# Patient Record
Sex: FEMALE | Race: WHITE | NOT HISPANIC OR LATINO | URBAN - METROPOLITAN AREA
[De-identification: names, ages, dates, MRNs, and addresses within clinical notes are randomized per-mention and may not be internally consistent; named-entity substitution may affect disease eponyms.]

---

## 2020-04-18 ENCOUNTER — EMERGENCY (EMERGENCY)
Facility: HOSPITAL | Age: 85
LOS: 0 days | Discharge: HOME | End: 2020-04-18
Payer: MEDICARE

## 2020-04-18 VITALS
TEMPERATURE: 100 F | HEART RATE: 72 BPM | DIASTOLIC BLOOD PRESSURE: 79 MMHG | SYSTOLIC BLOOD PRESSURE: 141 MMHG | OXYGEN SATURATION: 99 % | RESPIRATION RATE: 18 BRPM

## 2020-04-18 DIAGNOSIS — R05 COUGH: ICD-10-CM

## 2020-04-18 DIAGNOSIS — R50.9 FEVER, UNSPECIFIED: ICD-10-CM

## 2020-04-18 DIAGNOSIS — B34.9 VIRAL INFECTION, UNSPECIFIED: ICD-10-CM

## 2020-04-18 DIAGNOSIS — R19.7 DIARRHEA, UNSPECIFIED: ICD-10-CM

## 2020-04-18 PROCEDURE — 99282 EMERGENCY DEPT VISIT SF MDM: CPT

## 2020-04-18 PROCEDURE — 99283 EMERGENCY DEPT VISIT LOW MDM: CPT

## 2020-04-18 PROCEDURE — 99283 EMERGENCY DEPT VISIT LOW MDM: CPT | Mod: CS

## 2020-04-18 PROCEDURE — 87635 SARS-COV-2 COVID-19 AMP PRB: CPT

## 2020-04-18 NOTE — ED STATDOCS - OBJECTIVE STATEMENT
Pt presents to ED with  low grade fever, cough, diarrhea x 3 days.   Pt concerned for possible COVID-19.   Pt here for testing.

## 2020-04-18 NOTE — ED STATDOCS - NS ED ROS FT
ROS:   Constitutional- +fever, +chills.    ENT- +rhinorrhea, no sore throat, no congestion.    Cardiac- no chest pain, no palpitations,   Respiratory- no cough, no SOB    Abdomen- No nausea, no vomiting,  + diarrhea.    Urinary- no dysuria, no urgency, no frequency.    Skin- No rashes

## 2020-04-18 NOTE — ED STATDOCS - PHYSICAL EXAMINATION
Constitutional: NAD AAOx3. Nontoxic, well appearing. Speaking full sentences  w/o distress  Eyes: EOMI, pupils equal  Head: Normocephalic atraumatic  Mouth: no airway obstruction  Cardiac: [id number]   Resp: Lungs CTAB  GI: Abd s/nt/nd  Neuro: motor and sensory intact

## 2020-04-18 NOTE — ED STATDOCS - PATIENT PORTAL LINK FT
You can access the FollowMyHealth Patient Portal offered by [facility] by registering at the following website: [email or web address]. By joining [facility]’s FollowMyHealth portal, you will also be able to view your health information using other applications (apps) compatible with our system.

## 2020-04-18 NOTE — ED ADULT NURSE NOTE - OBJECTIVE STATEMENT
PATIENT WAS TREATED, EVALUATED AND DISCHARGED BY INTAKE PROVIDER. PLEASE SEE PROVIDER NOTE FOR ASSESSMENT.  DISCHARGE INSTRUCTIONS REVIEWED WITH PATIENT VERBALLY, PT VERBALIZED UNDERSTANDING OF DISCHARGE INSTRUCTIONS. PAPER COPY OF DISCHARGE INSTRUCTIONS GIVEN TO PATIENT WITH SELF YELITZA AND COVID 19 INFORMATION.  Patient has given verbal consent to call/text them with results.

## 2020-04-19 LAB — SARS-COV-2 RNA SPEC QL NAA+PROBE: SIGNIFICANT CHANGE UP

## 2020-04-20 ENCOUNTER — EMERGENCY (EMERGENCY)
Facility: HOSPITAL | Age: 85
LOS: 0 days | Discharge: HOME | End: 2020-04-20
Attending: EMERGENCY MEDICINE
Payer: MEDICARE

## 2020-04-20 VITALS
OXYGEN SATURATION: 94 % | DIASTOLIC BLOOD PRESSURE: 67 MMHG | RESPIRATION RATE: 18 BRPM | TEMPERATURE: 99 F | SYSTOLIC BLOOD PRESSURE: 140 MMHG

## 2020-04-20 VITALS — WEIGHT: 139.99 LBS

## 2020-04-20 DIAGNOSIS — R19.7 DIARRHEA, UNSPECIFIED: ICD-10-CM

## 2020-04-20 DIAGNOSIS — E11.9 TYPE 2 DIABETES MELLITUS WITHOUT COMPLICATIONS: ICD-10-CM

## 2020-04-20 DIAGNOSIS — R00.1 BRADYCARDIA, UNSPECIFIED: ICD-10-CM

## 2020-04-20 DIAGNOSIS — I67.89 OTHER CEREBROVASCULAR DISEASE: ICD-10-CM

## 2020-04-20 DIAGNOSIS — U07.1 COVID-19: ICD-10-CM

## 2020-04-20 DIAGNOSIS — F03.90 UNSPECIFIED DEMENTIA WITHOUT BEHAVIORAL DISTURBANCE: ICD-10-CM

## 2020-04-20 DIAGNOSIS — J12.89 OTHER VIRAL PNEUMONIA: ICD-10-CM

## 2020-04-20 DIAGNOSIS — I10 ESSENTIAL (PRIMARY) HYPERTENSION: ICD-10-CM

## 2020-04-20 DIAGNOSIS — R41.82 ALTERED MENTAL STATUS, UNSPECIFIED: ICD-10-CM

## 2020-04-20 LAB
ALBUMIN SERPL ELPH-MCNC: 2.8 G/DL — LOW (ref 3.3–5)
ALP SERPL-CCNC: 78 U/L — SIGNIFICANT CHANGE UP (ref 40–120)
ALT FLD-CCNC: 26 U/L — SIGNIFICANT CHANGE UP (ref 12–78)
ANION GAP SERPL CALC-SCNC: 4 MMOL/L — LOW (ref 5–17)
APPEARANCE UR: ABNORMAL
AST SERPL-CCNC: 42 U/L — HIGH (ref 15–37)
BASOPHILS # BLD AUTO: 0.02 K/UL — SIGNIFICANT CHANGE UP (ref 0–0.2)
BASOPHILS NFR BLD AUTO: 0.5 % — SIGNIFICANT CHANGE UP (ref 0–2)
BILIRUB SERPL-MCNC: 0.6 MG/DL — SIGNIFICANT CHANGE UP (ref 0.2–1.2)
BILIRUB UR-MCNC: NEGATIVE — SIGNIFICANT CHANGE UP
BUN SERPL-MCNC: 18 MG/DL — SIGNIFICANT CHANGE UP (ref 7–23)
CALCIUM SERPL-MCNC: 8.3 MG/DL — LOW (ref 8.5–10.1)
CHLORIDE SERPL-SCNC: 98 MMOL/L — SIGNIFICANT CHANGE UP (ref 96–108)
CO2 SERPL-SCNC: 34 MMOL/L — HIGH (ref 22–31)
COLOR SPEC: YELLOW — SIGNIFICANT CHANGE UP
COMMENT - URINE: SIGNIFICANT CHANGE UP
COMMENT - URINE: SIGNIFICANT CHANGE UP
CREAT SERPL-MCNC: 1.06 MG/DL — SIGNIFICANT CHANGE UP (ref 0.5–1.3)
CRP SERPL-MCNC: 2.98 MG/DL — HIGH (ref 0–0.4)
D DIMER BLD IA.RAPID-MCNC: 447 NG/ML DDU — HIGH
DIFF PNL FLD: ABNORMAL
EOSINOPHIL # BLD AUTO: 0 K/UL — SIGNIFICANT CHANGE UP (ref 0–0.5)
EOSINOPHIL NFR BLD AUTO: 0 % — SIGNIFICANT CHANGE UP (ref 0–6)
FERRITIN SERPL-MCNC: 259 NG/ML — HIGH (ref 15–150)
FIBRINOGEN PPP-MCNC: 663 MG/DL — HIGH (ref 320–520)
GLUCOSE SERPL-MCNC: 114 MG/DL — HIGH (ref 70–99)
GLUCOSE UR QL: NEGATIVE MG/DL — SIGNIFICANT CHANGE UP
HCT VFR BLD CALC: 39.4 % — SIGNIFICANT CHANGE UP (ref 34.5–45)
HGB BLD-MCNC: 12.9 G/DL — SIGNIFICANT CHANGE UP (ref 11.5–15.5)
IMM GRANULOCYTES NFR BLD AUTO: 0.3 % — SIGNIFICANT CHANGE UP (ref 0–1.5)
KETONES UR-MCNC: NEGATIVE — SIGNIFICANT CHANGE UP
LACTATE SERPL-SCNC: 0.8 MMOL/L — SIGNIFICANT CHANGE UP (ref 0.7–2)
LDH SERPL L TO P-CCNC: 217 U/L — SIGNIFICANT CHANGE UP (ref 84–241)
LEUKOCYTE ESTERASE UR-ACNC: NEGATIVE — SIGNIFICANT CHANGE UP
LYMPHOCYTES # BLD AUTO: 1.18 K/UL — SIGNIFICANT CHANGE UP (ref 1–3.3)
LYMPHOCYTES # BLD AUTO: 30.7 % — SIGNIFICANT CHANGE UP (ref 13–44)
MCHC RBC-ENTMCNC: 27.7 PG — SIGNIFICANT CHANGE UP (ref 27–34)
MCHC RBC-ENTMCNC: 32.7 GM/DL — SIGNIFICANT CHANGE UP (ref 32–36)
MCV RBC AUTO: 84.5 FL — SIGNIFICANT CHANGE UP (ref 80–100)
MONOCYTES # BLD AUTO: 0.52 K/UL — SIGNIFICANT CHANGE UP (ref 0–0.9)
MONOCYTES NFR BLD AUTO: 13.5 % — SIGNIFICANT CHANGE UP (ref 2–14)
NEUTROPHILS # BLD AUTO: 2.11 K/UL — SIGNIFICANT CHANGE UP (ref 1.8–7.4)
NEUTROPHILS NFR BLD AUTO: 55 % — SIGNIFICANT CHANGE UP (ref 43–77)
NITRITE UR-MCNC: NEGATIVE — SIGNIFICANT CHANGE UP
PH UR: 5 — SIGNIFICANT CHANGE UP (ref 5–8)
PLATELET # BLD AUTO: 125 K/UL — LOW (ref 150–400)
POTASSIUM SERPL-MCNC: 3.3 MMOL/L — LOW (ref 3.5–5.3)
POTASSIUM SERPL-SCNC: 3.3 MMOL/L — LOW (ref 3.5–5.3)
PROCALCITONIN SERPL-MCNC: 0.09 NG/ML — SIGNIFICANT CHANGE UP (ref 0.02–0.1)
PROT SERPL-MCNC: 6.3 GM/DL — SIGNIFICANT CHANGE UP (ref 6–8.3)
PROT UR-MCNC: 100 MG/DL
RBC # BLD: 4.66 M/UL — SIGNIFICANT CHANGE UP (ref 3.8–5.2)
RBC # FLD: 13.6 % — SIGNIFICANT CHANGE UP (ref 10.3–14.5)
RBC CASTS # UR COMP ASSIST: SIGNIFICANT CHANGE UP /HPF (ref 0–4)
SARS-COV-2 RNA SPEC QL NAA+PROBE: DETECTED
SODIUM SERPL-SCNC: 136 MMOL/L — SIGNIFICANT CHANGE UP (ref 135–145)
SP GR SPEC: 1.02 — SIGNIFICANT CHANGE UP (ref 1.01–1.02)
UROBILINOGEN FLD QL: NEGATIVE MG/DL — SIGNIFICANT CHANGE UP
WBC # BLD: 3.84 K/UL — SIGNIFICANT CHANGE UP (ref 3.8–10.5)
WBC # FLD AUTO: 3.84 K/UL — SIGNIFICANT CHANGE UP (ref 3.8–10.5)

## 2020-04-20 PROCEDURE — 85384 FIBRINOGEN ACTIVITY: CPT

## 2020-04-20 PROCEDURE — 99285 EMERGENCY DEPT VISIT HI MDM: CPT | Mod: CS

## 2020-04-20 PROCEDURE — 85025 COMPLETE CBC W/AUTO DIFF WBC: CPT

## 2020-04-20 PROCEDURE — 81001 URINALYSIS AUTO W/SCOPE: CPT

## 2020-04-20 PROCEDURE — 71045 X-RAY EXAM CHEST 1 VIEW: CPT | Mod: 26

## 2020-04-20 PROCEDURE — 93005 ELECTROCARDIOGRAM TRACING: CPT

## 2020-04-20 PROCEDURE — 82728 ASSAY OF FERRITIN: CPT

## 2020-04-20 PROCEDURE — 83605 ASSAY OF LACTIC ACID: CPT

## 2020-04-20 PROCEDURE — 93010 ELECTROCARDIOGRAM REPORT: CPT

## 2020-04-20 PROCEDURE — 84145 PROCALCITONIN (PCT): CPT

## 2020-04-20 PROCEDURE — 85379 FIBRIN DEGRADATION QUANT: CPT

## 2020-04-20 PROCEDURE — 71045 X-RAY EXAM CHEST 1 VIEW: CPT

## 2020-04-20 PROCEDURE — 99285 EMERGENCY DEPT VISIT HI MDM: CPT | Mod: 25

## 2020-04-20 PROCEDURE — 36415 COLL VENOUS BLD VENIPUNCTURE: CPT

## 2020-04-20 PROCEDURE — 96360 HYDRATION IV INFUSION INIT: CPT

## 2020-04-20 PROCEDURE — 87635 SARS-COV-2 COVID-19 AMP PRB: CPT

## 2020-04-20 PROCEDURE — 70450 CT HEAD/BRAIN W/O DYE: CPT | Mod: 26

## 2020-04-20 PROCEDURE — 80053 COMPREHEN METABOLIC PANEL: CPT

## 2020-04-20 PROCEDURE — 83615 LACTATE (LD) (LDH) ENZYME: CPT

## 2020-04-20 PROCEDURE — 86140 C-REACTIVE PROTEIN: CPT

## 2020-04-20 PROCEDURE — 70450 CT HEAD/BRAIN W/O DYE: CPT

## 2020-04-20 RX ORDER — SODIUM CHLORIDE 9 MG/ML
500 INJECTION INTRAMUSCULAR; INTRAVENOUS; SUBCUTANEOUS ONCE
Refills: 0 | Status: COMPLETED | OUTPATIENT
Start: 2020-04-20 | End: 2020-04-20

## 2020-04-20 RX ORDER — ACETAMINOPHEN 500 MG
650 TABLET ORAL ONCE
Refills: 0 | Status: COMPLETED | OUTPATIENT
Start: 2020-04-20 | End: 2020-04-20

## 2020-04-20 RX ADMIN — SODIUM CHLORIDE 500 MILLILITER(S): 9 INJECTION INTRAMUSCULAR; INTRAVENOUS; SUBCUTANEOUS at 11:30

## 2020-04-20 RX ADMIN — SODIUM CHLORIDE 500 MILLILITER(S): 9 INJECTION INTRAMUSCULAR; INTRAVENOUS; SUBCUTANEOUS at 12:22

## 2020-04-20 RX ADMIN — Medication 650 MILLIGRAM(S): at 12:22

## 2020-04-20 NOTE — ED PROVIDER NOTE - PROGRESS NOTE DETAILS
86 y/o F presents with diarrhea. As per daughter pt has been having frequent episodes of diarrhea, decreased appetite and lethargy. Family member at home + for covid. -Tyler Oleary PA-C pt ambualted around ED, O2 sat maintined at 98%. High suspcion for covid despite negative result. SPoke with patients daughter, discsussed supportive care. She feels comfortable caring for pt at home. Advised to return to ED immediately for any new or concerning symptoms or worsening symptoms. -Tyler Oleary PA-C

## 2020-04-20 NOTE — ED PROVIDER NOTE - CARE PLAN
Principal Discharge DX:	Diarrhea  Secondary Diagnosis:	Viral syndrome Principal Discharge DX:	Diarrhea  Secondary Diagnosis:	Viral syndrome  Secondary Diagnosis:	Pneumonia

## 2020-04-20 NOTE — ED ADULT TRIAGE NOTE - CHIEF COMPLAINT QUOTE
Pt sent by NP from [facility] for possible dehydration.  Pt with baseline dementia, dropped off from home by family member Pt sent by NP from [facility] for possible dehydration.  Pt with baseline dementia, dropped off from home by family member.  Pt without complaints.  Hx DM.

## 2020-04-20 NOTE — ED PROVIDER NOTE - CLINICAL SUMMARY MEDICAL DECISION MAKING FREE TEXT BOX
Pt with progressively worsening AMS, possible infectious encephalopathy, likely covid despite negative testing yesterday. Pt with diarrhea and decreased PO intake, per daughter pt requiring force-fed fluids and food, incontinent of stool. Will image head and chest, check urine, will resend COVID and provide IV fluids, if pt tolerating PO fluids, and improving on reassessment, will DC pending results. Pt with more drowsy  and with decreased appetite.  Has some baseline dementia, but appears to be at baseline although feeling ill.  No acute complaints per patient.  Per discussion with daughter the patient is not eating and having multiple bouts of diarrhea.  Story, labs, and imaging all c/w covid despite recent negative test.  Will reswab.  Pt with normal ambulatory sats and satting well throughout ed stay.  Suspect covid after w/u.  No other s/sx of underlying infection.  Later noted to be covid positive.  Daughter comfortable with caring for patient at home.  D/c home with strict return precautions and prompt outpatient f/u.

## 2020-04-20 NOTE — ED ADULT NURSE REASSESSMENT NOTE - NS ED NURSE REASSESS COMMENT FT1
Pt was able to walk with assistance around the nurses station .pt maintained  her O2 sat 97%  at all time while walking.

## 2020-04-20 NOTE — ED ADULT NURSE NOTE - OBJECTIVE STATEMENT
Pt came from home .according to her daughter pt is not her self weak and heaving diarrhea. pt is a/o 2 name and place. pt new she lives with her daughter but she didn't know how she got here. pt has dementia. Ashutosh FLOREZ spoke to pt's daughter.according to pt's daughter one of the people in the house have covid.

## 2020-04-20 NOTE — ED ADULT NURSE NOTE - CHIEF COMPLAINT QUOTE
Pt sent by NP from [facility] for possible dehydration.  Pt with baseline dementia, dropped off from home by family member.  Pt without complaints.  Hx DM.

## 2020-04-20 NOTE — ED PROVIDER NOTE - NSFOLLOWUPINSTRUCTIONS_ED_ALL_ED_FT
Acute Diarrhea    WHAT YOU NEED TO KNOW:    Acute diarrhea starts quickly and lasts a short time, usually 1 to 3 days. It can last up to 2 weeks. You may not be able to control your diarrhea. Acute diarrhea usually stops on its own.     DISCHARGE INSTRUCTIONS:    Return to the emergency department if:     You feel confused.       Your heartbeat is faster than usual.       Your eyes look deeply sunken, or you have no tears when you cry.       You urinate less than usual, or your urine is dark yellow.       You have blood or mucus in your bowel movements.      You have severe abdominal pain.       You are unable to drink any liquids.     Contact your healthcare provider if:     Your symptoms do not get better with treatment.       You have a fever higher than 101.3°F (38.5°C).       You have trouble eating and drinking because you are vomiting.       Your diarrhea does not get better in 7 days.       You have questions or concerns about your condition or care.     Follow up with your healthcare provider as directed: Write down your questions so you remember to ask them during your visits.     Medicines:    Diarrhea medicine is an over-the-counter medicine that helps slow or stop your diarrhea. Do not take this medicine unless your healthcare provider says it is okay.       Antibiotics may be given to help treat an infection caused by bacteria.       Antiparasitics may be given to treat an infection caused by parasites.       Take your medicine as directed. Contact your healthcare provider if you think your medicine is not helping or if you have side effects. Tell him of her if you are allergic to any medicine. Keep a list of the medicines, vitamins, and herbs you take. Include the amounts, and when and why you take them. Bring the list or the pill bottles to follow-up visits. Carry your medicine list with you in case of an emergency.    Self-care:     Drink liquids as directed. Liquids will help prevent dehydration caused by diarrhea. Ask your healthcare provider how much liquid to drink each day and which liquids are best for you. You may need to drink an oral rehydration solution (ORS). An ORS has the right amounts of water, salts, and sugar you need to replace body fluids. You can buy an ORS at most grocery stores and pharmacies.       Eat foods that are easy to digest. Examples include rice, lentils, cereal, bananas, potatoes, and bread. It also includes some fruits (bananas, melon), well-cooked vegetables, and lean meats. Do not eat foods high in fiber, fat, and sugar. Do not drink alcohol until your diarrhea is gone.     Prevent acute diarrhea:     Wash your hands often. Use soap and water. Wash your hands before you eat or prepare food. Also wash your hands after you use the bathroom. Use an alcohol-based hand gel when soap and water are not available. Handwashing           Keep bathroom surfaces clean. This helps prevent the spread of germs that cause acute diarrhea.       Wash fruits and vegetables well before you eat them. This can help remove germs that cause diarrhea. If possible, remove the skin from fruits and vegetables, or cook them well before you eat them.       Cook meat and poultry as directed. Meat includes beef and pork. Poultry includes chicken, turkey, and duck.  Cook ground meat to 160°F.       Cook ground poultry, whole poultry, or cuts of poultry to at least 165°F. Remove the poultry from heat. Let it stand for 3 minutes before you eat it.       Cook whole cuts of meat other than poultry to at least 145°F. Remove the meat from heat. Let it stand for 3 minutes before you eat it.       Wash dishes that have touched raw meat or poultry with hot water and soap. This includes cutting boards, utensils, dishes, and serving containers.       Place raw or cooked meat or poultry in the refrigerator as soon as possible. Bacteria can grow in meat or poultry that is left at room temperature too long.       Do not eat raw or undercooked oysters, clams, or mussels. These foods may be contaminated and cause infection.       Drink only filtered or treated water when you travel. Do not put ice in your drinks. Drink bottled water whenever possible.     COVID-19  COVID-19, also known as coronavirus disease or novel coronavirus, is caused by a type of virus that causes respiratory illness. This may lead to inflammation and the buildup of mucus and fluids in the airway of the lungs (pneumonia). There are many different coronaviruses. Most of these viruses only affect animals, but sometimes these viruses can change and infect people.  What are the causes?  This illness is caused by a virus. You may catch the virus by:  Breathing in droplets from an infected person's cough or sneeze.Touching something, like a table or a doorknob, that was exposed to the virus (contaminated) and then touching your mouth, nose, or eyes.Being around animals that carry the virus, or eating uncooked or undercooked meat or animal products that contain the virus.What increases the risk?  You are more likely to develop this condition if you:  Live in or travel to an area with a COVID-19 outbreak.Come in contact with a sick person who recently traveled to an area with a COVID-19 outbreak.Provide care for or live with a person who is infected with COVID-19.What are the signs or symptoms?  COVID-19 causes respiratory illness that can lead to pneumonia. Symptoms of pneumonia may include:  A fever.A cough.Difficulty breathing.How is this diagnosed?  This condition may be diagnosed based on:  Your signs and symptoms, especially if:  You live in an area with a COVID-19 outbreak.You recently traveled to or from an area where the virus is common.You provide care for or live with a person who was diagnosed with COVID-19.A physical exam.Lab tests, which may include:  A nasal swab to take a sample of fluid from your nose.A throat swab to take a sample of fluid from your throat.A sample of mucus from your lungs (sputum).Blood tests.How is this treated?  There is no medicine to treat COVID-19. Your health care provider will talk with you about ways to treat your symptoms. This may include rest, fluids, and over-the-counter medicines.  Follow these instructions at home:  Lifestyle     Use a cool-mist humidifier to add moisture to the air. This can help you breathe more easily.Do not use any products that contain nicotine or tobacco, such as cigarettes, e-cigarettes, and chewing tobacco. If you need help quitting, ask your health care provider.Rest at home as told by your health care provider.Return to your normal activities as told by your health care provider. Ask your health care provider what activities are safe for you.General instructions     Take over-the-counter and prescription medicines only as told by your health care provider.Drink enough fluid to keep your urine pale yellow.Keep all follow-up visits as told by your health care provider. This is important.How is this prevented?     There is no vaccine to help prevent COVID-19 infection. However, there are steps you can take to protect yourself and others from this virus.  To protect yourself:     Do not travel to areas where COVID-19 is a risk. The areas where COVID-19 is reported change often. To identify high-risk areas, check the CDC travel website: wwwnc.cdc.gov/travel/noticesIf you live in, or must travel to, an area where COVID-19 is a risk, take precautions to avoid infection.  Stay away from people who are sick.Stay away from places where there are animals that may carry the virus. This includes places where animals and animal products are sold. Note that both living and dead animals can carry the virus.Wash your hands often with soap and water. If soap and water are not available, use an alcohol-based hand .Avoid touching your mouth, face, eyes, or nose.To protect others:     If you have symptoms, take steps to prevent the virus from spreading to others.  If you think you have a COVID-19 infection, contact your health care provider right away. Tell your health care team that you think you may have a COVID-19 infection.Stay home. Leave your house only to seek medical care.Do not travel while you are sick.Wash your hands often with soap and water. If soap and water are not available, use alcohol-based hand .Stay away from other members of your household. If possible, stay in your own room, separate from others. Use a different bathroom.Make sure that all people in your household wash their hands well and often.Cough or sneeze into a tissue or your sleeve or elbow. Do not cough or sneeze into your hand or into the air.Wear a face mask.Where to find more information  Centers for Disease Control and Prevention: www.cdc.gov/coronavirus/2019-ncov/index.htmlWformerly [facility] Health Organization: www.who.int/health-topics/coronavirusContact a health care provider if:  You have traveled to an area where COVID-19 is a risk and you have symptoms of the infection.You have contact with someone who has traveled to an area where COVID-19 is a risk and you have symptoms of the infection.Get help right away if:  You have trouble breathing.You have chest pain.Summary  COVID-19 is caused by a type of virus that causes respiratory illness. This may lead to inflammation and the buildup of mucus and fluids in the airway of the lungs (pneumonia).You are more likely to develop this condition if you live in or travel to an area with a COVID-19 outbreak.There is no medicine to treat COVID-19. Your health care provider will talk with you about ways to treat your symptoms.Take steps to protect yourself and others from infection. Wash your hands often. Stay away from other people who are sick and wear a mask if you are sick. Return to ED immediately for any new or concerning symptoms or worsening symptoms.     Acute Diarrhea    WHAT YOU NEED TO KNOW:    Acute diarrhea starts quickly and lasts a short time, usually 1 to 3 days. It can last up to 2 weeks. You may not be able to control your diarrhea. Acute diarrhea usually stops on its own.     DISCHARGE INSTRUCTIONS:    Return to the emergency department if:     You feel confused.       Your heartbeat is faster than usual.       Your eyes look deeply sunken, or you have no tears when you cry.       You urinate less than usual, or your urine is dark yellow.       You have blood or mucus in your bowel movements.      You have severe abdominal pain.       You are unable to drink any liquids.     Contact your healthcare provider if:     Your symptoms do not get better with treatment.       You have a fever higher than 101.3°F (38.5°C).       You have trouble eating and drinking because you are vomiting.       Your diarrhea does not get better in 7 days.       You have questions or concerns about your condition or care.     Follow up with your healthcare provider as directed: Write down your questions so you remember to ask them during your visits.     Medicines:    Diarrhea medicine is an over-the-counter medicine that helps slow or stop your diarrhea. Do not take this medicine unless your healthcare provider says it is okay.       Antibiotics may be given to help treat an infection caused by bacteria.       Antiparasitics may be given to treat an infection caused by parasites.       Take your medicine as directed. Contact your healthcare provider if you think your medicine is not helping or if you have side effects. Tell him of her if you are allergic to any medicine. Keep a list of the medicines, vitamins, and herbs you take. Include the amounts, and when and why you take them. Bring the list or the pill bottles to follow-up visits. Carry your medicine list with you in case of an emergency.    Self-care:     Drink liquids as directed. Liquids will help prevent dehydration caused by diarrhea. Ask your healthcare provider how much liquid to drink each day and which liquids are best for you. You may need to drink an oral rehydration solution (ORS). An ORS has the right amounts of water, salts, and sugar you need to replace body fluids. You can buy an ORS at most grocery stores and pharmacies.       Eat foods that are easy to digest. Examples include rice, lentils, cereal, bananas, potatoes, and bread. It also includes some fruits (bananas, melon), well-cooked vegetables, and lean meats. Do not eat foods high in fiber, fat, and sugar. Do not drink alcohol until your diarrhea is gone.     Prevent acute diarrhea:     Wash your hands often. Use soap and water. Wash your hands before you eat or prepare food. Also wash your hands after you use the bathroom. Use an alcohol-based hand gel when soap and water are not available. Handwashing           Keep bathroom surfaces clean. This helps prevent the spread of germs that cause acute diarrhea.       Wash fruits and vegetables well before you eat them. This can help remove germs that cause diarrhea. If possible, remove the skin from fruits and vegetables, or cook them well before you eat them.       Cook meat and poultry as directed. Meat includes beef and pork. Poultry includes chicken, turkey, and duck.  Cook ground meat to 160°F.       Cook ground poultry, whole poultry, or cuts of poultry to at least 165°F. Remove the poultry from heat. Let it stand for 3 minutes before you eat it.       Cook whole cuts of meat other than poultry to at least 145°F. Remove the meat from heat. Let it stand for 3 minutes before you eat it.       Wash dishes that have touched raw meat or poultry with hot water and soap. This includes cutting boards, utensils, dishes, and serving containers.       Place raw or cooked meat or poultry in the refrigerator as soon as possible. Bacteria can grow in meat or poultry that is left at room temperature too long.       Do not eat raw or undercooked oysters, clams, or mussels. These foods may be contaminated and cause infection.       Drink only filtered or treated water when you travel. Do not put ice in your drinks. Drink bottled water whenever possible.     COVID-19  COVID-19, also known as coronavirus disease or novel coronavirus, is caused by a type of virus that causes respiratory illness. This may lead to inflammation and the buildup of mucus and fluids in the airway of the lungs (pneumonia). There are many different coronaviruses. Most of these viruses only affect animals, but sometimes these viruses can change and infect people.  What are the causes?  This illness is caused by a virus. You may catch the virus by:  Breathing in droplets from an infected person's cough or sneeze.Touching something, like a table or a doorknob, that was exposed to the virus (contaminated) and then touching your mouth, nose, or eyes.Being around animals that carry the virus, or eating uncooked or undercooked meat or animal products that contain the virus.What increases the risk?  You are more likely to develop this condition if you:  Live in or travel to an area with a COVID-19 outbreak.Come in contact with a sick person who recently traveled to an area with a COVID-19 outbreak.Provide care for or live with a person who is infected with COVID-19.What are the signs or symptoms?  COVID-19 causes respiratory illness that can lead to pneumonia. Symptoms of pneumonia may include:  A fever.A cough.Difficulty breathing.How is this diagnosed?  This condition may be diagnosed based on:  Your signs and symptoms, especially if:  You live in an area with a COVID-19 outbreak.You recently traveled to or from an area where the virus is common.You provide care for or live with a person who was diagnosed with COVID-19.A physical exam.Lab tests, which may include:  A nasal swab to take a sample of fluid from your nose.A throat swab to take a sample of fluid from your throat.A sample of mucus from your lungs (sputum).Blood tests.How is this treated?  There is no medicine to treat COVID-19. Your health care provider will talk with you about ways to treat your symptoms. This may include rest, fluids, and over-the-counter medicines.  Follow these instructions at home:  Lifestyle     Use a cool-mist humidifier to add moisture to the air. This can help you breathe more easily.Do not use any products that contain nicotine or tobacco, such as cigarettes, e-cigarettes, and chewing tobacco. If you need help quitting, ask your health care provider.Rest at home as told by your health care provider.Return to your normal activities as told by your health care provider. Ask your health care provider what activities are safe for you.General instructions     Take over-the-counter and prescription medicines only as told by your health care provider.Drink enough fluid to keep your urine pale yellow.Keep all follow-up visits as told by your health care provider. This is important.How is this prevented?     There is no vaccine to help prevent COVID-19 infection. However, there are steps you can take to protect yourself and others from this virus.  To protect yourself:     Do not travel to areas where COVID-19 is a risk. The areas where COVID-19 is reported change often. To identify high-risk areas, check the CDC travel website: wwwnc.cdc.gov/travel/noticesIf you live in, or must travel to, an area where COVID-19 is a risk, take precautions to avoid infection.  Stay away from people who are sick.Stay away from places where there are animals that may carry the virus. This includes places where animals and animal products are sold. Note that both living and dead animals can carry the virus.Wash your hands often with soap and water. If soap and water are not available, use an alcohol-based hand .Avoid touching your mouth, face, eyes, or nose.To protect others:     If you have symptoms, take steps to prevent the virus from spreading to others.  If you think you have a COVID-19 infection, contact your health care provider right away. Tell your health care team that you think you may have a COVID-19 infection.Stay home. Leave your house only to seek medical care.Do not travel while you are sick.Wash your hands often with soap and water. If soap and water are not available, use alcohol-based hand .Stay away from other members of your household. If possible, stay in your own room, separate from others. Use a different bathroom.Make sure that all people in your household wash their hands well and often.Cough or sneeze into a tissue or your sleeve or elbow. Do not cough or sneeze into your hand or into the air.Wear a face mask.Where to find more information  Centers for Disease Control and Prevention: www.cdc.gov/coronavirus/2019-ncov/index.htmlWLourdes [facility] Health Organization: www.who.int/health-topics/coronavirusContact a health care provider if:  You have traveled to an area where COVID-19 is a risk and you have symptoms of the infection.You have contact with someone who has traveled to an area where COVID-19 is a risk and you have symptoms of the infection.Get help right away if:  You have trouble breathing.You have chest pain.Summary  COVID-19 is caused by a type of virus that causes respiratory illness. This may lead to inflammation and the buildup of mucus and fluids in the airway of the lungs (pneumonia).You are more likely to develop this condition if you live in or travel to an area with a COVID-19 outbreak.There is no medicine to treat COVID-19. Your health care provider will talk with you about ways to treat your symptoms.Take steps to protect yourself and others from infection. Wash your hands often. Stay away from other people who are sick and wear a mask if you are sick.

## 2020-04-20 NOTE — ED PROVIDER NOTE - PATIENT PORTAL LINK FT
You can access the FollowMyHealth Patient Portal offered by [facility] by registering at the following website: [email or web address]. By joining [employer]’s FollowMyHealth portal, you will also be able to view your health information using other applications (apps) compatible with our system.

## 2020-04-24 ENCOUNTER — EMERGENCY (EMERGENCY)
Facility: HOSPITAL | Age: 85
LOS: 0 days | Discharge: HOME | End: 2020-04-24
Attending: EMERGENCY MEDICINE
Payer: MEDICARE

## 2020-04-24 VITALS
RESPIRATION RATE: 20 BRPM | DIASTOLIC BLOOD PRESSURE: 47 MMHG | HEART RATE: 70 BPM | SYSTOLIC BLOOD PRESSURE: 120 MMHG | OXYGEN SATURATION: 92 % | TEMPERATURE: 98 F

## 2020-04-24 VITALS
SYSTOLIC BLOOD PRESSURE: 115 MMHG | DIASTOLIC BLOOD PRESSURE: 53 MMHG | HEART RATE: 64 BPM | RESPIRATION RATE: 17 BRPM | OXYGEN SATURATION: 98 %

## 2020-04-24 DIAGNOSIS — F03.90 UNSPECIFIED DEMENTIA WITHOUT BEHAVIORAL DISTURBANCE: ICD-10-CM

## 2020-04-24 DIAGNOSIS — E11.9 TYPE 2 DIABETES MELLITUS WITHOUT COMPLICATIONS: ICD-10-CM

## 2020-04-24 DIAGNOSIS — U07.1 COVID-19: ICD-10-CM

## 2020-04-24 DIAGNOSIS — I10 ESSENTIAL (PRIMARY) HYPERTENSION: ICD-10-CM

## 2020-04-24 DIAGNOSIS — R53.1 WEAKNESS: ICD-10-CM

## 2020-04-24 DIAGNOSIS — R06.02 SHORTNESS OF BREATH: ICD-10-CM

## 2020-04-24 LAB
ALBUMIN SERPL ELPH-MCNC: 2.6 G/DL — LOW (ref 3.3–5)
ALP SERPL-CCNC: 74 U/L — SIGNIFICANT CHANGE UP (ref 40–120)
ALT FLD-CCNC: 25 U/L — SIGNIFICANT CHANGE UP (ref 12–78)
ANION GAP SERPL CALC-SCNC: 14 MMOL/L — SIGNIFICANT CHANGE UP (ref 5–17)
APPEARANCE UR: ABNORMAL
APTT BLD: 28.7 SEC — SIGNIFICANT CHANGE UP (ref 27.5–36.3)
AST SERPL-CCNC: 38 U/L — HIGH (ref 15–37)
BASOPHILS # BLD AUTO: 0 K/UL — SIGNIFICANT CHANGE UP (ref 0–0.2)
BASOPHILS NFR BLD AUTO: 0 % — SIGNIFICANT CHANGE UP (ref 0–2)
BILIRUB SERPL-MCNC: 0.7 MG/DL — SIGNIFICANT CHANGE UP (ref 0.2–1.2)
BILIRUB UR-MCNC: NEGATIVE — SIGNIFICANT CHANGE UP
BUN SERPL-MCNC: 22 MG/DL — SIGNIFICANT CHANGE UP (ref 7–23)
CALCIUM SERPL-MCNC: 8.3 MG/DL — LOW (ref 8.5–10.1)
CHLORIDE SERPL-SCNC: 90 MMOL/L — LOW (ref 96–108)
CK SERPL-CCNC: 58 U/L — SIGNIFICANT CHANGE UP (ref 26–192)
CO2 SERPL-SCNC: 27 MMOL/L — SIGNIFICANT CHANGE UP (ref 22–31)
COLOR SPEC: YELLOW — SIGNIFICANT CHANGE UP
CREAT SERPL-MCNC: 1.07 MG/DL — SIGNIFICANT CHANGE UP (ref 0.5–1.3)
CRP SERPL-MCNC: 7.84 MG/DL — HIGH (ref 0–0.4)
D DIMER BLD IA.RAPID-MCNC: 475 NG/ML DDU — HIGH
DIFF PNL FLD: ABNORMAL
EOSINOPHIL # BLD AUTO: 0 K/UL — SIGNIFICANT CHANGE UP (ref 0–0.5)
EOSINOPHIL NFR BLD AUTO: 0 % — SIGNIFICANT CHANGE UP (ref 0–6)
FERRITIN SERPL-MCNC: 1213 NG/ML — HIGH (ref 15–150)
FIBRINOGEN PPP-MCNC: 798 MG/DL — HIGH (ref 320–520)
GLUCOSE SERPL-MCNC: 96 MG/DL — SIGNIFICANT CHANGE UP (ref 70–99)
GLUCOSE UR QL: NEGATIVE MG/DL — SIGNIFICANT CHANGE UP
HCT VFR BLD CALC: 39.6 % — SIGNIFICANT CHANGE UP (ref 34.5–45)
HGB BLD-MCNC: 13.3 G/DL — SIGNIFICANT CHANGE UP (ref 11.5–15.5)
IMM GRANULOCYTES NFR BLD AUTO: 0.6 % — SIGNIFICANT CHANGE UP (ref 0–1.5)
INR BLD: 1.01 RATIO — SIGNIFICANT CHANGE UP (ref 0.88–1.16)
KETONES UR-MCNC: ABNORMAL
LACTATE SERPL-SCNC: 1.2 MMOL/L — SIGNIFICANT CHANGE UP (ref 0.7–2)
LDH SERPL L TO P-CCNC: 287 U/L — HIGH (ref 84–241)
LEUKOCYTE ESTERASE UR-ACNC: NEGATIVE — SIGNIFICANT CHANGE UP
LYMPHOCYTES # BLD AUTO: 0.88 K/UL — LOW (ref 1–3.3)
LYMPHOCYTES # BLD AUTO: 18.9 % — SIGNIFICANT CHANGE UP (ref 13–44)
MCHC RBC-ENTMCNC: 27.5 PG — SIGNIFICANT CHANGE UP (ref 27–34)
MCHC RBC-ENTMCNC: 33.6 GM/DL — SIGNIFICANT CHANGE UP (ref 32–36)
MCV RBC AUTO: 82 FL — SIGNIFICANT CHANGE UP (ref 80–100)
MONOCYTES # BLD AUTO: 0.4 K/UL — SIGNIFICANT CHANGE UP (ref 0–0.9)
MONOCYTES NFR BLD AUTO: 8.6 % — SIGNIFICANT CHANGE UP (ref 2–14)
NEUTROPHILS # BLD AUTO: 3.35 K/UL — SIGNIFICANT CHANGE UP (ref 1.8–7.4)
NEUTROPHILS NFR BLD AUTO: 71.9 % — SIGNIFICANT CHANGE UP (ref 43–77)
NITRITE UR-MCNC: NEGATIVE — SIGNIFICANT CHANGE UP
PH UR: 5 — SIGNIFICANT CHANGE UP (ref 5–8)
PLATELET # BLD AUTO: 151 K/UL — SIGNIFICANT CHANGE UP (ref 150–400)
POTASSIUM SERPL-MCNC: 3.5 MMOL/L — SIGNIFICANT CHANGE UP (ref 3.5–5.3)
POTASSIUM SERPL-SCNC: 3.5 MMOL/L — SIGNIFICANT CHANGE UP (ref 3.5–5.3)
PROCALCITONIN SERPL-MCNC: 0.18 NG/ML — HIGH (ref 0.02–0.1)
PROT SERPL-MCNC: 6.4 GM/DL — SIGNIFICANT CHANGE UP (ref 6–8.3)
PROT UR-MCNC: 500 MG/DL
PROTHROM AB SERPL-ACNC: 11.2 SEC — SIGNIFICANT CHANGE UP (ref 10–12.9)
RBC # BLD: 4.83 M/UL — SIGNIFICANT CHANGE UP (ref 3.8–5.2)
RBC # FLD: 13.1 % — SIGNIFICANT CHANGE UP (ref 10.3–14.5)
SODIUM SERPL-SCNC: 131 MMOL/L — LOW (ref 135–145)
SP GR SPEC: 1.02 — SIGNIFICANT CHANGE UP (ref 1.01–1.02)
TROPONIN I SERPL-MCNC: 0.02 NG/ML — SIGNIFICANT CHANGE UP (ref 0.01–0.04)
UROBILINOGEN FLD QL: NEGATIVE MG/DL — SIGNIFICANT CHANGE UP
WBC # BLD: 4.66 K/UL — SIGNIFICANT CHANGE UP (ref 3.8–10.5)
WBC # FLD AUTO: 4.66 K/UL — SIGNIFICANT CHANGE UP (ref 3.8–10.5)

## 2020-04-24 PROCEDURE — 36415 COLL VENOUS BLD VENIPUNCTURE: CPT

## 2020-04-24 PROCEDURE — 93010 ELECTROCARDIOGRAM REPORT: CPT

## 2020-04-24 PROCEDURE — 84484 ASSAY OF TROPONIN QUANT: CPT

## 2020-04-24 PROCEDURE — 71045 X-RAY EXAM CHEST 1 VIEW: CPT

## 2020-04-24 PROCEDURE — 84145 PROCALCITONIN (PCT): CPT

## 2020-04-24 PROCEDURE — 99284 EMERGENCY DEPT VISIT MOD MDM: CPT | Mod: CS

## 2020-04-24 PROCEDURE — 85379 FIBRIN DEGRADATION QUANT: CPT

## 2020-04-24 PROCEDURE — 71045 X-RAY EXAM CHEST 1 VIEW: CPT | Mod: 26,CS

## 2020-04-24 PROCEDURE — 86140 C-REACTIVE PROTEIN: CPT

## 2020-04-24 PROCEDURE — 85025 COMPLETE CBC W/AUTO DIFF WBC: CPT

## 2020-04-24 PROCEDURE — 99285 EMERGENCY DEPT VISIT HI MDM: CPT | Mod: 25

## 2020-04-24 PROCEDURE — 93005 ELECTROCARDIOGRAM TRACING: CPT

## 2020-04-24 PROCEDURE — 85384 FIBRINOGEN ACTIVITY: CPT

## 2020-04-24 PROCEDURE — 80053 COMPREHEN METABOLIC PANEL: CPT

## 2020-04-24 PROCEDURE — 83605 ASSAY OF LACTIC ACID: CPT

## 2020-04-24 PROCEDURE — 87040 BLOOD CULTURE FOR BACTERIA: CPT

## 2020-04-24 PROCEDURE — 96360 HYDRATION IV INFUSION INIT: CPT

## 2020-04-24 PROCEDURE — 82728 ASSAY OF FERRITIN: CPT

## 2020-04-24 PROCEDURE — 83615 LACTATE (LD) (LDH) ENZYME: CPT

## 2020-04-24 PROCEDURE — 85610 PROTHROMBIN TIME: CPT

## 2020-04-24 PROCEDURE — 85730 THROMBOPLASTIN TIME PARTIAL: CPT

## 2020-04-24 PROCEDURE — 82550 ASSAY OF CK (CPK): CPT

## 2020-04-24 PROCEDURE — 81001 URINALYSIS AUTO W/SCOPE: CPT

## 2020-04-24 PROCEDURE — 87086 URINE CULTURE/COLONY COUNT: CPT

## 2020-04-24 RX ORDER — MEMANTINE HYDROCHLORIDE 10 MG/1
1 TABLET ORAL
Qty: 0 | Refills: 0 | DISCHARGE

## 2020-04-24 RX ORDER — SODIUM CHLORIDE 9 MG/ML
1000 INJECTION INTRAMUSCULAR; INTRAVENOUS; SUBCUTANEOUS ONCE
Refills: 0 | Status: COMPLETED | OUTPATIENT
Start: 2020-04-24 | End: 2020-04-24

## 2020-04-24 RX ORDER — ONDANSETRON 8 MG/1
1 TABLET, FILM COATED ORAL
Qty: 9 | Refills: 0
Start: 2020-04-24 | End: 2020-04-26

## 2020-04-24 RX ADMIN — SODIUM CHLORIDE 1000 MILLILITER(S): 9 INJECTION INTRAMUSCULAR; INTRAVENOUS; SUBCUTANEOUS at 16:30

## 2020-04-24 RX ADMIN — SODIUM CHLORIDE 500 MILLILITER(S): 9 INJECTION INTRAMUSCULAR; INTRAVENOUS; SUBCUTANEOUS at 15:20

## 2020-04-24 NOTE — ED PROVIDER NOTE - OBJECTIVE STATEMENT
86 y/o female with PMHx of dementia, HTN, DM presenting to the ED for SOB. Pt unable to give hx. Pt came in with daughter for decreased PO, difficulty ambulating and SOB with exertion. 84 y/o female with PMHx of dementia, HTN, DM presenting to the ED for SOB. Pt unable to give hx. Pt came in with daughter for decreased PO, difficulty ambulating and SOB with exertion.  Recently diagnosed COVID positive.

## 2020-04-24 NOTE — ED ADULT NURSE NOTE - CHIEF COMPLAINT
Called to review his BP readings of 170-200.  He was on Losartan in past which was stopped secondary to hyperkalemia.  Will start on Norvasc 5 mg daily.  Pt has been asked to go for labs.     The patient is a 85y Female complaining of shortness of breath.

## 2020-04-24 NOTE — ED PROVIDER NOTE - ENMT, MLM
Airway patent, Nasal mucosa clear. Mouth with mildly dry MM. Throat has no vesicles, no oropharyngeal exudates and uvula is midline.

## 2020-04-24 NOTE — ED PROVIDER NOTE - CLINICAL SUMMARY MEDICAL DECISION MAKING FREE TEXT BOX
86 y/o F with decreased PO intake. Pt rehydrated. Discussed placement vs dc with daughter, she would prefer pt to be discharged home pending lab tests

## 2020-04-24 NOTE — ED ADULT NURSE NOTE - CHIEF COMPLAINT QUOTE
Pt has known covid and BIB daughter with report of extreme fatigue, dyspnea with any exertion, and poor po intake.  Per daughter, pt hx of dementia at baseline.

## 2020-04-24 NOTE — ED ADULT NURSE NOTE - NSIMPLEMENTINTERV_GEN_ALL_ED
Implemented All Fall with Harm Risk Interventions:  [place] to call system. Call bell, personal items and telephone within reach. Instruct patient to call for assistance. Room bathroom lighting operational. Non-slip footwear when patient is off stretcher. Physically safe environment: no spills, clutter or unnecessary equipment. Stretcher in lowest position, wheels locked, appropriate side rails in place. Provide visual cue, wrist band, yellow gown, etc. Monitor gait and stability. Monitor for mental status changes and reorient to person, place, and time. Review medications for side effects contributing to fall risk. Reinforce activity limits and safety measures with patient and family. Provide visual clues: red socks.

## 2020-04-24 NOTE — ED PROVIDER NOTE - CPE EDP SKIN NORM
LOV 4/18/19  NOV 6/25/19    lyrica  5/6/19 #30 R 1
Pt called back and said she had another refill on file, and does not need this refilled at this time.  Closing Encounter
Pt states she is completely out of this medication and would like to know if it can be filled today.  Please advise
normal...

## 2020-04-24 NOTE — ED ADULT NURSE NOTE - OBJECTIVE STATEMENT
Pt brought from home by daughter for worsening weakness, poor po intake.  +covid 4/20.  EKG done on arrival.   Cardiac and VS monitoring initiated.  20g PIV placed.

## 2020-04-24 NOTE — ED PROVIDER NOTE - PATIENT PORTAL LINK FT
You can access the FollowMyHealth Patient Portal offered by [facility] by registering at the following website: [email or web address]. By joining [employer]’s FollowMyHealth portal, you will also be able to view your health information using other applications (apps) compatible with our system.

## 2020-04-24 NOTE — ED PROVIDER NOTE - NSFOLLOWUPINSTRUCTIONS_ED_ALL_ED_FT
COVID-19  COVID-19, also known as coronavirus disease or novel coronavirus, is caused by a type of virus that causes respiratory illness. This may lead to inflammation and the buildup of mucus and fluids in the airway of the lungs (pneumonia). There are many different coronaviruses. Most of these viruses only affect animals, but sometimes these viruses can change and infect people.  What are the causes?  This illness is caused by a virus. You may catch the virus by:  Breathing in droplets from an infected person's cough or sneeze.Touching something, like a table or a doorknob, that was exposed to the virus (contaminated) and then touching your mouth, nose, or eyes.Being around animals that carry the virus, or eating uncooked or undercooked meat or animal products that contain the virus.What increases the risk?  You are more likely to develop this condition if you:  Live in or travel to an area with a COVID-19 outbreak.Come in contact with a sick person who recently traveled to an area with a COVID-19 outbreak.Provide care for or live with a person who is infected with COVID-19.What are the signs or symptoms?  COVID-19 causes respiratory illness that can lead to pneumonia. Symptoms of pneumonia may include:  A fever.A cough.Difficulty breathing.How is this diagnosed?  This condition may be diagnosed based on:  Your signs and symptoms, especially if:  You live in an area with a COVID-19 outbreak.You recently traveled to or from an area where the virus is common.You provide care for or live with a person who was diagnosed with COVID-19.A physical exam.Lab tests, which may include:  A nasal swab to take a sample of fluid from your nose.A throat swab to take a sample of fluid from your throat.A sample of mucus from your lungs (sputum).Blood tests.How is this treated?  There is no medicine to treat COVID-19. Your health care provider will talk with you about ways to treat your symptoms. This may include rest, fluids, and over-the-counter medicines.  Follow these instructions at home:  Lifestyle     Use a cool-mist humidifier to add moisture to the air. This can help you breathe more easily.Do not use any products that contain nicotine or tobacco, such as cigarettes, e-cigarettes, and chewing tobacco. If you need help quitting, ask your health care provider.Rest at home as told by your health care provider.Return to your normal activities as told by your health care provider. Ask your health care provider what activities are safe for you.General instructions     Take over-the-counter and prescription medicines only as told by your health care provider.Drink enough fluid to keep your urine pale yellow.Keep all follow-up visits as told by your health care provider. This is important.How is this prevented?     There is no vaccine to help prevent COVID-19 infection. However, there are steps you can take to protect yourself and others from this virus.  To protect yourself:     Do not travel to areas where COVID-19 is a risk. The areas where COVID-19 is reported change often. To identify high-risk areas, check the CDC travel website: wwwnc.cdc.gov/travel/noticesIf you live in, or must travel to, an area where COVID-19 is a risk, take precautions to avoid infection.  Stay away from people who are sick.Stay away from places where there are animals that may carry the virus. This includes places where animals and animal products are sold. Note that both living and dead animals can carry the virus.Wash your hands often with soap and water. If soap and water are not available, use an alcohol-based hand .Avoid touching your mouth, face, eyes, or nose.To protect others:     If you have symptoms, take steps to prevent the virus from spreading to others.  If you think you have a COVID-19 infection, contact your health care provider right away. Tell your health care team that you think you may have a COVID-19 infection.Stay home. Leave your house only to seek medical care.Do not travel while you are sick.Wash your hands often with soap and water. If soap and water are not available, use alcohol-based hand .Stay away from other members of your household. If possible, stay in your own room, separate from others. Use a different bathroom.Make sure that all people in your household wash their hands well and often.Cough or sneeze into a tissue or your sleeve or elbow. Do not cough or sneeze into your hand or into the air.Wear a face mask.Where to find more information  Centers for Disease Control and Prevention: www.cdc.gov/coronavirus/2019-ncov/index.htmlWVirgin[facility] Health Organization: www.who.int/health-topics/coronavirusContact a health care provider if:  You have traveled to an area where COVID-19 is a risk and you have symptoms of the infection.You have contact with someone who has traveled to an area where COVID-19 is a risk and you have symptoms of the infection.Get help right away if:  You have trouble breathing.You have chest pain.Summary  COVID-19 is caused by a type of virus that causes respiratory illness. This may lead to inflammation and the buildup of mucus and fluids in the airway of the lungs (pneumonia).You are more likely to develop this condition if you live in or travel to an area with a COVID-19 outbreak.There is no medicine to treat COVID-19. Your health care provider will talk with you about ways to treat your symptoms.Take steps to protect yourself and others from infection. Wash your hands often. Stay away from other people who are sick and wear a mask if you are sick.

## 2020-04-24 NOTE — ED ADULT NURSE REASSESSMENT NOTE - NS ED NURSE REASSESS COMMENT FT1
Daughter Franci Wesley ([phone]) states that she has difficulty getting pt to take oral fluids at home, requests port placement.    Contact information taken.

## 2020-04-24 NOTE — ED PROVIDER NOTE - PROGRESS NOTE DETAILS
Spoke with patients daughter, she was concerned because she is having a hard time getting her to eat or drink at home, reports increased weakness. Discussed labs results with daughter and option for admission to facility. She would rather care for patient at home, and will Fu with PMD. -Tyler Oleary PA-C

## 2020-04-25 LAB
CULTURE RESULTS: SIGNIFICANT CHANGE UP
SPECIMEN SOURCE: SIGNIFICANT CHANGE UP

## 2020-04-29 LAB
CULTURE RESULTS: SIGNIFICANT CHANGE UP
SPECIMEN SOURCE: SIGNIFICANT CHANGE UP

## 2020-05-13 ENCOUNTER — EMERGENCY (EMERGENCY)
Facility: HOSPITAL | Age: 85
LOS: 0 days | Discharge: HOME | End: 2020-05-13
Attending: STUDENT IN AN ORGANIZED HEALTH CARE EDUCATION/TRAINING PROGRAM
Payer: MEDICARE

## 2020-05-13 VITALS
SYSTOLIC BLOOD PRESSURE: 133 MMHG | WEIGHT: 119.93 LBS | DIASTOLIC BLOOD PRESSURE: 67 MMHG | OXYGEN SATURATION: 96 % | TEMPERATURE: 98 F | HEIGHT: 65 IN | HEART RATE: 71 BPM | RESPIRATION RATE: 16 BRPM

## 2020-05-13 VITALS
TEMPERATURE: 98 F | HEART RATE: 58 BPM | RESPIRATION RATE: 16 BRPM | DIASTOLIC BLOOD PRESSURE: 59 MMHG | OXYGEN SATURATION: 92 % | SYSTOLIC BLOOD PRESSURE: 115 MMHG

## 2020-05-13 DIAGNOSIS — Z79.84 LONG TERM (CURRENT) USE OF ORAL HYPOGLYCEMIC DRUGS: ICD-10-CM

## 2020-05-13 DIAGNOSIS — I10 ESSENTIAL (PRIMARY) HYPERTENSION: ICD-10-CM

## 2020-05-13 DIAGNOSIS — E11.9 TYPE 2 DIABETES MELLITUS WITHOUT COMPLICATIONS: ICD-10-CM

## 2020-05-13 DIAGNOSIS — R94.31 ABNORMAL ELECTROCARDIOGRAM [ECG] [EKG]: ICD-10-CM

## 2020-05-13 DIAGNOSIS — U07.1 COVID-19: ICD-10-CM

## 2020-05-13 DIAGNOSIS — F03.90 UNSPECIFIED DEMENTIA WITHOUT BEHAVIORAL DISTURBANCE: ICD-10-CM

## 2020-05-13 DIAGNOSIS — Z79.82 LONG TERM (CURRENT) USE OF ASPIRIN: ICD-10-CM

## 2020-05-13 DIAGNOSIS — N30.00 ACUTE CYSTITIS WITHOUT HEMATURIA: ICD-10-CM

## 2020-05-13 DIAGNOSIS — M47.9 SPONDYLOSIS, UNSPECIFIED: ICD-10-CM

## 2020-05-13 DIAGNOSIS — J18.9 PNEUMONIA, UNSPECIFIED ORGANISM: ICD-10-CM

## 2020-05-13 LAB
ALBUMIN SERPL ELPH-MCNC: 2.9 G/DL — LOW (ref 3.3–5)
ALP SERPL-CCNC: 77 U/L — SIGNIFICANT CHANGE UP (ref 40–120)
ALT FLD-CCNC: 24 U/L — SIGNIFICANT CHANGE UP (ref 12–78)
ANION GAP SERPL CALC-SCNC: 6 MMOL/L — SIGNIFICANT CHANGE UP (ref 5–17)
APPEARANCE UR: ABNORMAL
APTT BLD: 32.2 SEC — SIGNIFICANT CHANGE UP (ref 27.5–36.3)
AST SERPL-CCNC: 20 U/L — SIGNIFICANT CHANGE UP (ref 15–37)
BASOPHILS # BLD AUTO: 0.07 K/UL — SIGNIFICANT CHANGE UP (ref 0–0.2)
BASOPHILS NFR BLD AUTO: 1 % — SIGNIFICANT CHANGE UP (ref 0–2)
BILIRUB SERPL-MCNC: 0.9 MG/DL — SIGNIFICANT CHANGE UP (ref 0.2–1.2)
BILIRUB UR-MCNC: NEGATIVE — SIGNIFICANT CHANGE UP
BUN SERPL-MCNC: 40 MG/DL — HIGH (ref 7–23)
CALCIUM SERPL-MCNC: 9.6 MG/DL — SIGNIFICANT CHANGE UP (ref 8.5–10.1)
CHLORIDE SERPL-SCNC: 100 MMOL/L — SIGNIFICANT CHANGE UP (ref 96–108)
CO2 SERPL-SCNC: 33 MMOL/L — HIGH (ref 22–31)
COLOR SPEC: YELLOW — SIGNIFICANT CHANGE UP
CREAT SERPL-MCNC: 1 MG/DL — SIGNIFICANT CHANGE UP (ref 0.5–1.3)
DIFF PNL FLD: ABNORMAL
EOSINOPHIL # BLD AUTO: 0.11 K/UL — SIGNIFICANT CHANGE UP (ref 0–0.5)
EOSINOPHIL NFR BLD AUTO: 1.6 % — SIGNIFICANT CHANGE UP (ref 0–6)
GLUCOSE SERPL-MCNC: 164 MG/DL — HIGH (ref 70–99)
GLUCOSE UR QL: NEGATIVE MG/DL — SIGNIFICANT CHANGE UP
HCT VFR BLD CALC: 42.2 % — SIGNIFICANT CHANGE UP (ref 34.5–45)
HGB BLD-MCNC: 13.4 G/DL — SIGNIFICANT CHANGE UP (ref 11.5–15.5)
IMM GRANULOCYTES NFR BLD AUTO: 0.3 % — SIGNIFICANT CHANGE UP (ref 0–1.5)
INR BLD: 1.1 RATIO — SIGNIFICANT CHANGE UP (ref 0.88–1.16)
KETONES UR-MCNC: ABNORMAL
LEUKOCYTE ESTERASE UR-ACNC: ABNORMAL
LYMPHOCYTES # BLD AUTO: 1.54 K/UL — SIGNIFICANT CHANGE UP (ref 1–3.3)
LYMPHOCYTES # BLD AUTO: 23.1 % — SIGNIFICANT CHANGE UP (ref 13–44)
MCHC RBC-ENTMCNC: 27.5 PG — SIGNIFICANT CHANGE UP (ref 27–34)
MCHC RBC-ENTMCNC: 31.8 GM/DL — LOW (ref 32–36)
MCV RBC AUTO: 86.5 FL — SIGNIFICANT CHANGE UP (ref 80–100)
MONOCYTES # BLD AUTO: 0.71 K/UL — SIGNIFICANT CHANGE UP (ref 0–0.9)
MONOCYTES NFR BLD AUTO: 10.6 % — SIGNIFICANT CHANGE UP (ref 2–14)
NEUTROPHILS # BLD AUTO: 4.23 K/UL — SIGNIFICANT CHANGE UP (ref 1.8–7.4)
NEUTROPHILS NFR BLD AUTO: 63.4 % — SIGNIFICANT CHANGE UP (ref 43–77)
NITRITE UR-MCNC: POSITIVE
PH UR: 5 — SIGNIFICANT CHANGE UP (ref 5–8)
PLATELET # BLD AUTO: 216 K/UL — SIGNIFICANT CHANGE UP (ref 150–400)
POTASSIUM SERPL-MCNC: 3.4 MMOL/L — LOW (ref 3.5–5.3)
POTASSIUM SERPL-SCNC: 3.4 MMOL/L — LOW (ref 3.5–5.3)
PROT SERPL-MCNC: 6.9 GM/DL — SIGNIFICANT CHANGE UP (ref 6–8.3)
PROT UR-MCNC: 100 MG/DL
PROTHROM AB SERPL-ACNC: 12.2 SEC — SIGNIFICANT CHANGE UP (ref 10–12.9)
RBC # BLD: 4.88 M/UL — SIGNIFICANT CHANGE UP (ref 3.8–5.2)
RBC # FLD: 14.6 % — HIGH (ref 10.3–14.5)
SODIUM SERPL-SCNC: 139 MMOL/L — SIGNIFICANT CHANGE UP (ref 135–145)
SP GR SPEC: 1.01 — SIGNIFICANT CHANGE UP (ref 1.01–1.02)
UROBILINOGEN FLD QL: NEGATIVE MG/DL — SIGNIFICANT CHANGE UP
WBC # BLD: 6.68 K/UL — SIGNIFICANT CHANGE UP (ref 3.8–10.5)
WBC # FLD AUTO: 6.68 K/UL — SIGNIFICANT CHANGE UP (ref 3.8–10.5)

## 2020-05-13 PROCEDURE — 36415 COLL VENOUS BLD VENIPUNCTURE: CPT

## 2020-05-13 PROCEDURE — 85730 THROMBOPLASTIN TIME PARTIAL: CPT

## 2020-05-13 PROCEDURE — 99285 EMERGENCY DEPT VISIT HI MDM: CPT | Mod: CS

## 2020-05-13 PROCEDURE — 93010 ELECTROCARDIOGRAM REPORT: CPT

## 2020-05-13 PROCEDURE — 81001 URINALYSIS AUTO W/SCOPE: CPT

## 2020-05-13 PROCEDURE — 85610 PROTHROMBIN TIME: CPT

## 2020-05-13 PROCEDURE — 71045 X-RAY EXAM CHEST 1 VIEW: CPT

## 2020-05-13 PROCEDURE — 85025 COMPLETE CBC W/AUTO DIFF WBC: CPT

## 2020-05-13 PROCEDURE — 87086 URINE CULTURE/COLONY COUNT: CPT

## 2020-05-13 PROCEDURE — 71045 X-RAY EXAM CHEST 1 VIEW: CPT | Mod: 26

## 2020-05-13 PROCEDURE — 82962 GLUCOSE BLOOD TEST: CPT

## 2020-05-13 PROCEDURE — 96374 THER/PROPH/DIAG INJ IV PUSH: CPT

## 2020-05-13 PROCEDURE — 99284 EMERGENCY DEPT VISIT MOD MDM: CPT | Mod: 25

## 2020-05-13 PROCEDURE — 87186 SC STD MICRODIL/AGAR DIL: CPT

## 2020-05-13 PROCEDURE — 80053 COMPREHEN METABOLIC PANEL: CPT

## 2020-05-13 PROCEDURE — 93005 ELECTROCARDIOGRAM TRACING: CPT

## 2020-05-13 RX ORDER — CEPHALEXIN 500 MG
1 CAPSULE ORAL
Qty: 20 | Refills: 0
Start: 2020-05-13 | End: 2020-05-22

## 2020-05-13 RX ORDER — SODIUM CHLORIDE 9 MG/ML
1000 INJECTION INTRAMUSCULAR; INTRAVENOUS; SUBCUTANEOUS ONCE
Refills: 0 | Status: COMPLETED | OUTPATIENT
Start: 2020-05-13 | End: 2020-05-13

## 2020-05-13 RX ORDER — CEFTRIAXONE 500 MG/1
1000 INJECTION, POWDER, FOR SOLUTION INTRAMUSCULAR; INTRAVENOUS ONCE
Refills: 0 | Status: COMPLETED | OUTPATIENT
Start: 2020-05-13 | End: 2020-05-13

## 2020-05-13 RX ORDER — L.ACIDOPH/B.ANIMALIS/B.LONGUM 15B CELL
1 CAPSULE ORAL
Qty: 0 | Refills: 0 | DISCHARGE

## 2020-05-13 RX ADMIN — SODIUM CHLORIDE 1000 MILLILITER(S): 9 INJECTION INTRAMUSCULAR; INTRAVENOUS; SUBCUTANEOUS at 13:31

## 2020-05-13 RX ADMIN — CEFTRIAXONE 1000 MILLIGRAM(S): 500 INJECTION, POWDER, FOR SOLUTION INTRAMUSCULAR; INTRAVENOUS at 13:30

## 2020-05-13 NOTE — ED PROVIDER NOTE - PATIENT PORTAL LINK FT
You can access the FollowMyHealth Patient Portal offered by [facility] by registering at the following website: [email or web address]. By joining [employer]’s FollowMyHealth portal, you will also be able to view your health information using other applications (apps) compatible with our system.

## 2020-05-13 NOTE — ED PROVIDER NOTE - ATTENDING CONTRIBUTION TO CARE
I, Sera Snow DO,  performed the initial face to face bedside interview with this patient regarding history of present illness, review of symptoms and relevant past medical, social and family history.  I completed an independent physical examination.  I was the initial provider who evaluated this patient. I have signed out the follow up of any pending tests (i.e. labs, radiological studies) to the ACP.  I have communicated the patient’s plan of care and disposition with the ACP.  The history, relevant review of systems, past medical and surgical history, medical decision making, and physical examination was documented by the scribe in my presence and I attest to the accuracy of the documentation.

## 2020-05-13 NOTE — ED PROVIDER NOTE - OBJECTIVE STATEMENT
84 y/o female with PMHx of dementia, DM, HTN presents to the ED BIBA c/o generalized weakness. Pt's daughter called EMS due to pt feeling weak with a blood sugar I the 170s. Known +COVID. No other complaints at this time. 86 y/o female with PMHx of dementia, DM, HTN presents to the ED BIBA c/o generalized weakness. Pt's daughter called EMS due to pt feeling weak with a blood sugar ?in the 170s. Known +COVID. No other complaints at this time; no new trauma or injury; no chest pain; no sob; no abdominal pain; no urinary complaints.

## 2020-05-13 NOTE — ED PROVIDER NOTE - PROGRESS NOTE DETAILS
spoke with pt's daughter who she lives with daughter states pt's COVID sx have improved but over the past 2 days she has become progressively weaker and has not been eating or drinking  Geeta Bernstein PA-C Pt with nitrate positive UTI, will give ceftriaxone   Geeta Bernstein PA-C other labs WNL, spoke with daughter regarding UTI, will send rx for abx, daughter willing to take pt back home, advised PMD f/u   Geeta Bernstein PA-C

## 2020-05-13 NOTE — ED ADULT TRIAGE NOTE - CHIEF COMPLAINT QUOTE
Pt. to the ED C/O Weakness, Dizziness with general malaise and hyperglycemia- EMS reports that patient's relative tested + COVID- Hx. of Diabetes- No respiratory distress noted at this time

## 2020-05-15 NOTE — ED POST DISCHARGE NOTE - DETAILS
spoke with patient's daughter Franci. E.coli resistant to Keflex. Switched to sensitive abx  Bactrim DS. dc Keflex. f/u pmd. ~Adrian De Jesus PA-C

## 2023-08-14 NOTE — ED PROVIDER NOTE - PMH
[employer] Notified at 2511 - Left message as requested on answering machine.  Requistion # [id number] Dementia    DM (diabetes mellitus)    HTN (hypertension)

## 2023-09-17 ENCOUNTER — INPATIENT (INPATIENT)
Facility: HOSPITAL | Age: 88
LOS: 6 days | Discharge: HOME HEALTH | DRG: 871 | End: 2023-09-24
Attending: HOSPITALIST | Admitting: INTERNAL MEDICINE
Payer: MEDICARE

## 2023-09-17 VITALS
SYSTOLIC BLOOD PRESSURE: 145 MMHG | OXYGEN SATURATION: 93 % | TEMPERATURE: 103 F | DIASTOLIC BLOOD PRESSURE: 70 MMHG | WEIGHT: 154.98 LBS | RESPIRATION RATE: 18 BRPM | HEART RATE: 110 BPM | HEIGHT: 64 IN

## 2023-09-17 LAB
ALBUMIN SERPL ELPH-MCNC: 3.3 G/DL — SIGNIFICANT CHANGE UP (ref 3.3–5)
ALP SERPL-CCNC: 107 U/L — SIGNIFICANT CHANGE UP (ref 40–120)
ALT FLD-CCNC: 22 U/L — SIGNIFICANT CHANGE UP (ref 12–78)
ANION GAP SERPL CALC-SCNC: 5 MMOL/L — SIGNIFICANT CHANGE UP (ref 5–17)
ANISOCYTOSIS BLD QL: SLIGHT — SIGNIFICANT CHANGE UP
APTT BLD: 21.7 SEC — LOW (ref 24.5–35.6)
AST SERPL-CCNC: 21 U/L — SIGNIFICANT CHANGE UP (ref 15–37)
BASOPHILS # BLD AUTO: 0 K/UL — SIGNIFICANT CHANGE UP (ref 0–0.2)
BASOPHILS NFR BLD AUTO: 0 % — SIGNIFICANT CHANGE UP (ref 0–2)
BILIRUB SERPL-MCNC: 0.8 MG/DL — SIGNIFICANT CHANGE UP (ref 0.2–1.2)
BUN SERPL-MCNC: 34 MG/DL — HIGH (ref 7–23)
CALCIUM SERPL-MCNC: 9.2 MG/DL — SIGNIFICANT CHANGE UP (ref 8.5–10.1)
CHLORIDE SERPL-SCNC: 104 MMOL/L — SIGNIFICANT CHANGE UP (ref 96–108)
CO2 SERPL-SCNC: 29 MMOL/L — SIGNIFICANT CHANGE UP (ref 22–31)
CREAT SERPL-MCNC: 1.57 MG/DL — HIGH (ref 0.5–1.3)
EGFR: 32 ML/MIN/1.73M2 — LOW
EOSINOPHIL # BLD AUTO: 0 K/UL — SIGNIFICANT CHANGE UP (ref 0–0.5)
EOSINOPHIL NFR BLD AUTO: 0 % — SIGNIFICANT CHANGE UP (ref 0–6)
GLUCOSE SERPL-MCNC: 287 MG/DL — HIGH (ref 70–99)
HCT VFR BLD CALC: 42.5 % — SIGNIFICANT CHANGE UP (ref 34.5–45)
HGB BLD-MCNC: 14.1 G/DL — SIGNIFICANT CHANGE UP (ref 11.5–15.5)
INR BLD: 0.94 RATIO — SIGNIFICANT CHANGE UP (ref 0.85–1.18)
LACTATE SERPL-SCNC: 3.5 MMOL/L — HIGH (ref 0.7–2)
LYMPHOCYTES # BLD AUTO: 0.44 K/UL — LOW (ref 1–3.3)
LYMPHOCYTES # BLD AUTO: 9 % — LOW (ref 13–44)
MANUAL SMEAR VERIFICATION: SIGNIFICANT CHANGE UP
MCHC RBC-ENTMCNC: 29.6 PG — SIGNIFICANT CHANGE UP (ref 27–34)
MCHC RBC-ENTMCNC: 33.2 GM/DL — SIGNIFICANT CHANGE UP (ref 32–36)
MCV RBC AUTO: 89.1 FL — SIGNIFICANT CHANGE UP (ref 80–100)
METAMYELOCYTES # FLD: 1 % — HIGH (ref 0–0)
MONOCYTES # BLD AUTO: 0 K/UL — SIGNIFICANT CHANGE UP (ref 0–0.9)
MONOCYTES NFR BLD AUTO: 0 % — LOW (ref 2–14)
MYELOCYTES NFR BLD: 1 % — HIGH (ref 0–0)
NEUTROPHILS # BLD AUTO: 4.33 K/UL — SIGNIFICANT CHANGE UP (ref 1.8–7.4)
NEUTROPHILS NFR BLD AUTO: 88 % — HIGH (ref 43–77)
NEUTS BAND # BLD: 1 % — SIGNIFICANT CHANGE UP (ref 0–8)
NRBC # BLD: 1 /100 — HIGH (ref 0–0)
NRBC # BLD: SIGNIFICANT CHANGE UP /100 WBCS (ref 0–0)
PLAT MORPH BLD: NORMAL — SIGNIFICANT CHANGE UP
PLATELET # BLD AUTO: 188 K/UL — SIGNIFICANT CHANGE UP (ref 150–400)
PLATELET CLUMP BLD QL SMEAR: ABNORMAL
POTASSIUM SERPL-MCNC: 3.3 MMOL/L — LOW (ref 3.5–5.3)
POTASSIUM SERPL-SCNC: 3.3 MMOL/L — LOW (ref 3.5–5.3)
PROT SERPL-MCNC: 7.3 GM/DL — SIGNIFICANT CHANGE UP (ref 6–8.3)
PROTHROM AB SERPL-ACNC: 10.6 SEC — SIGNIFICANT CHANGE UP (ref 9.5–13)
RAPID RVP RESULT: SIGNIFICANT CHANGE UP
RBC # BLD: 4.77 M/UL — SIGNIFICANT CHANGE UP (ref 3.8–5.2)
RBC # FLD: 14.9 % — HIGH (ref 10.3–14.5)
RBC BLD AUTO: ABNORMAL
SARS-COV-2 RNA SPEC QL NAA+PROBE: SIGNIFICANT CHANGE UP
SMUDGE CELLS # BLD: PRESENT — SIGNIFICANT CHANGE UP
SODIUM SERPL-SCNC: 138 MMOL/L — SIGNIFICANT CHANGE UP (ref 135–145)
STOMATOCYTES BLD QL SMEAR: SLIGHT — SIGNIFICANT CHANGE UP
TROPONIN I, HIGH SENSITIVITY RESULT: 17.19 NG/L — SIGNIFICANT CHANGE UP
WBC # BLD: 4.87 K/UL — SIGNIFICANT CHANGE UP (ref 3.8–10.5)
WBC # FLD AUTO: 4.87 K/UL — SIGNIFICANT CHANGE UP (ref 3.8–10.5)

## 2023-09-17 PROCEDURE — 70498 CT ANGIOGRAPHY NECK: CPT | Mod: 26,MA

## 2023-09-17 PROCEDURE — 74177 CT ABD & PELVIS W/CONTRAST: CPT | Mod: 26,MA

## 2023-09-17 PROCEDURE — 93010 ELECTROCARDIOGRAM REPORT: CPT

## 2023-09-17 PROCEDURE — 70496 CT ANGIOGRAPHY HEAD: CPT | Mod: 26,MA

## 2023-09-17 PROCEDURE — 71045 X-RAY EXAM CHEST 1 VIEW: CPT | Mod: 26

## 2023-09-17 PROCEDURE — 99285 EMERGENCY DEPT VISIT HI MDM: CPT | Mod: FS

## 2023-09-17 PROCEDURE — 72170 X-RAY EXAM OF PELVIS: CPT | Mod: 26

## 2023-09-17 RX ORDER — SODIUM CHLORIDE 9 MG/ML
2200 INJECTION INTRAMUSCULAR; INTRAVENOUS; SUBCUTANEOUS ONCE
Refills: 0 | Status: COMPLETED | OUTPATIENT
Start: 2023-09-17 | End: 2023-09-17

## 2023-09-17 RX ORDER — POTASSIUM CHLORIDE 20 MEQ
40 PACKET (EA) ORAL ONCE
Refills: 0 | Status: COMPLETED | OUTPATIENT
Start: 2023-09-17 | End: 2023-09-17

## 2023-09-17 RX ORDER — PIPERACILLIN AND TAZOBACTAM 4; .5 G/20ML; G/20ML
3.38 INJECTION, POWDER, LYOPHILIZED, FOR SOLUTION INTRAVENOUS ONCE
Refills: 0 | Status: COMPLETED | OUTPATIENT
Start: 2023-09-17 | End: 2023-09-17

## 2023-09-17 RX ORDER — ACETAMINOPHEN 500 MG
1000 TABLET ORAL ONCE
Refills: 0 | Status: COMPLETED | OUTPATIENT
Start: 2023-09-17 | End: 2023-09-17

## 2023-09-17 RX ADMIN — Medication 400 MILLIGRAM(S): at 21:52

## 2023-09-17 RX ADMIN — SODIUM CHLORIDE 2200 MILLILITER(S): 9 INJECTION INTRAMUSCULAR; INTRAVENOUS; SUBCUTANEOUS at 22:50

## 2023-09-17 RX ADMIN — Medication 1000 MILLIGRAM(S): at 22:50

## 2023-09-17 RX ADMIN — SODIUM CHLORIDE 2200 MILLILITER(S): 9 INJECTION INTRAMUSCULAR; INTRAVENOUS; SUBCUTANEOUS at 21:52

## 2023-09-17 NOTE — ED ADULT NURSE NOTE - NSFALLHARMRISKINTERV_ED_ALL_ED

## 2023-09-17 NOTE — ED PROVIDER NOTE - PHYSICAL EXAMINATION
PA NOTE: GEN: AOX3, NAD. HEENT: Throat clear. Airway is patent. EYES: PERRLA. EOMI. Head: NC/AT. NECK: Supple, No JVD. FROM. C-spine non-tender. CV:S1S2, RRR, LUNGS: Non-labored breathing, no tachypnea. O2sat 100% RA. CTA b/l. No w/r/r. CHEST: Equal chest expansion and rise. No deformity. ABD: Soft, Mild distended abd. +Tenderness lower abd. No rebound, no guarding. No CVAT. EXT: No e/c/c. 2+ distal pulses. SKIN: No rashes. NEURO: No focal deficits. CN II-XII intact. FROM. 5/5 motor and sensory. ~Adrian De Jesus PA-C

## 2023-09-17 NOTE — ED PROVIDER NOTE - PROGRESS NOTE DETAILS
PA: Patient seen and evaluated. Will get sepsis labs, CT abd/pel, Ofirmev. Reassess. ~Adrian De Jesus PA-C Bernice Snow for attending Dr. Tamez  Patient is an 88 year old female with PMHx of early dementia, HTN, DM, HLD, who presents to ED c/o fever, n/v since this evening today. Patient lives with daughter. Per daughter at bedside, patient went out with aid today and in the evening at home she did not come down with her aid for dinner. Daughter went upstairs to check on her and states the patient tried to get up but slid off the couch onto her buttock. Also notes she was spitting up. Last known normal was last night. +Pneumonia on CXR. Spoke with hospitalist, will admit patient. ~Adrian De Jesus PA-C

## 2023-09-17 NOTE — ED PROVIDER NOTE - OBJECTIVE STATEMENT
PA: Patient is an 88 year old female with PMHx of early dementia, HTN, DM, HLD, who presents to [facility] c/o fever, n/v since this evening today. Patient lives with her daughter and she did not come down with her aid for dinner. Daughter went upstairs to check on her. Patient tried to get up from couch but couldn't, slowly went down on floor grazing her buttock area. No true fall injury or trauma. No head/neck injuries. No LOC. ~Adrian De Jesus PA-C

## 2023-09-17 NOTE — ED ADULT TRIAGE NOTE - CHIEF COMPLAINT QUOTE
Pt arrived by EMS from home s/p fall -ve headstrike, LOC, pain, SOB, blood thinner. Fall unwitnessed PMHx Dementia, Htn, Septic V/S noted in triage. Daughter presence

## 2023-09-17 NOTE — ED PROVIDER NOTE - LOCATION
Scheduling called to let you know his MRI was denied by insurance and they are calling the patient to cancel it.   abdomen

## 2023-09-17 NOTE — ED PROVIDER NOTE - ATTENDING APP SHARED VISIT CONTRIBUTION OF CARE
I, Amadou Tamez, DO personally saw the patient with YAMILKA.  I have personally performed a face to face diagnostic evaluation on this patient.  I have reviewed the YAMILKA note and agree with the history, exam, and plan of care, except as noted.  I personally saw the patient and performed a substantive portion of the visit including all aspects of the medical decision making.

## 2023-09-17 NOTE — ED ADULT NURSE NOTE - OBJECTIVE STATEMENT
Pt presents to the ED s/p fall off couch at home. - LOC, - headstrike. As per daughter, pt fell off couch and was unable to get up, endorses 1 episode of vomiting. Pt is full septic workup, currently a&ox1, pt is a&ox4 at baseline according to daughter. Pt is mumbling and moaning upon assessment. spO2 is 94% on 2L NC. IV established in right arm with a 20G, labs and blood cultures drawn and sent, call bell in reach, warm blanket provided, bed in lowest position, side rails up x2, MD evaluation in progress, pt on telemetry.

## 2023-09-17 NOTE — ED PROVIDER NOTE - NS ED ATTENDING STATEMENT MOD
This was a shared visit with the YAMILKA. I reviewed and verified the documentation and independently performed the documented:

## 2023-09-18 DIAGNOSIS — Z96.659 PRESENCE OF UNSPECIFIED ARTIFICIAL KNEE JOINT: Chronic | ICD-10-CM

## 2023-09-18 DIAGNOSIS — J18.9 PNEUMONIA, UNSPECIFIED ORGANISM: ICD-10-CM

## 2023-09-18 DIAGNOSIS — F03.90 UNSPECIFIED DEMENTIA, UNSPECIFIED SEVERITY, WITHOUT BEHAVIORAL DISTURBANCE, PSYCHOTIC DISTURBANCE, MOOD DISTURBANCE, AND ANXIETY: ICD-10-CM

## 2023-09-18 LAB
A1C WITH ESTIMATED AVERAGE GLUCOSE RESULT: 7.9 % — HIGH (ref 4–5.6)
ADD ON TEST-SPECIMEN IN LAB: SIGNIFICANT CHANGE UP
ANION GAP SERPL CALC-SCNC: 11 MMOL/L — SIGNIFICANT CHANGE UP (ref 5–17)
APPEARANCE UR: ABNORMAL
BACTERIA # UR AUTO: ABNORMAL
BASOPHILS # BLD AUTO: 0 K/UL — SIGNIFICANT CHANGE UP (ref 0–0.2)
BASOPHILS NFR BLD AUTO: 0 % — SIGNIFICANT CHANGE UP (ref 0–2)
BILIRUB UR-MCNC: NEGATIVE — SIGNIFICANT CHANGE UP
BUN SERPL-MCNC: 35 MG/DL — HIGH (ref 7–23)
CALCIUM SERPL-MCNC: 8.3 MG/DL — LOW (ref 8.5–10.1)
CHLORIDE SERPL-SCNC: 105 MMOL/L — SIGNIFICANT CHANGE UP (ref 96–108)
CO2 SERPL-SCNC: 21 MMOL/L — LOW (ref 22–31)
COLOR SPEC: YELLOW — SIGNIFICANT CHANGE UP
CREAT SERPL-MCNC: 1.82 MG/DL — HIGH (ref 0.5–1.3)
DIFF PNL FLD: ABNORMAL
E COLI DNA BLD POS QL NAA+NON-PROBE: SIGNIFICANT CHANGE UP
EGFR: 26 ML/MIN/1.73M2 — LOW
EOSINOPHIL # BLD AUTO: 0 K/UL — SIGNIFICANT CHANGE UP (ref 0–0.5)
EOSINOPHIL NFR BLD AUTO: 0 % — SIGNIFICANT CHANGE UP (ref 0–6)
EPI CELLS # UR: NEGATIVE — SIGNIFICANT CHANGE UP
ESTIMATED AVERAGE GLUCOSE: 180 MG/DL — HIGH (ref 68–114)
GLUCOSE BLDC GLUCOMTR-MCNC: 139 MG/DL — HIGH (ref 70–99)
GLUCOSE BLDC GLUCOMTR-MCNC: 184 MG/DL — HIGH (ref 70–99)
GLUCOSE BLDC GLUCOMTR-MCNC: 215 MG/DL — HIGH (ref 70–99)
GLUCOSE BLDC GLUCOMTR-MCNC: 296 MG/DL — HIGH (ref 70–99)
GLUCOSE SERPL-MCNC: 365 MG/DL — HIGH (ref 70–99)
GLUCOSE UR QL: NEGATIVE — SIGNIFICANT CHANGE UP
GRAM STN FLD: SIGNIFICANT CHANGE UP
HCT VFR BLD CALC: 37.5 % — SIGNIFICANT CHANGE UP (ref 34.5–45)
HGB BLD-MCNC: 12.3 G/DL — SIGNIFICANT CHANGE UP (ref 11.5–15.5)
KETONES UR-MCNC: NEGATIVE — SIGNIFICANT CHANGE UP
LACTATE SERPL-SCNC: 2.7 MMOL/L — HIGH (ref 0.7–2)
LACTATE SERPL-SCNC: 3.5 MMOL/L — HIGH (ref 0.7–2)
LACTATE SERPL-SCNC: 6 MMOL/L — CRITICAL HIGH (ref 0.7–2)
LACTATE SERPL-SCNC: 6.4 MMOL/L — CRITICAL HIGH (ref 0.7–2)
LEUKOCYTE ESTERASE UR-ACNC: NEGATIVE — SIGNIFICANT CHANGE UP
LYMPHOCYTES # BLD AUTO: 1.41 K/UL — SIGNIFICANT CHANGE UP (ref 1–3.3)
LYMPHOCYTES # BLD AUTO: 6 % — LOW (ref 13–44)
LYMPHOCYTES # SPEC AUTO: 1 % — HIGH (ref 0–0)
MAGNESIUM SERPL-MCNC: 1.8 MG/DL — SIGNIFICANT CHANGE UP (ref 1.6–2.6)
MANUAL SMEAR VERIFICATION: SIGNIFICANT CHANGE UP
MCHC RBC-ENTMCNC: 29.3 PG — SIGNIFICANT CHANGE UP (ref 27–34)
MCHC RBC-ENTMCNC: 32.8 GM/DL — SIGNIFICANT CHANGE UP (ref 32–36)
MCV RBC AUTO: 89.3 FL — SIGNIFICANT CHANGE UP (ref 80–100)
METAMYELOCYTES # FLD: 4 % — HIGH (ref 0–0)
METHOD TYPE: SIGNIFICANT CHANGE UP
MONOCYTES # BLD AUTO: 1.17 K/UL — HIGH (ref 0–0.9)
MONOCYTES NFR BLD AUTO: 5 % — SIGNIFICANT CHANGE UP (ref 2–14)
NEUTROPHILS # BLD AUTO: 19.68 K/UL — HIGH (ref 1.8–7.4)
NEUTROPHILS NFR BLD AUTO: 72 % — SIGNIFICANT CHANGE UP (ref 43–77)
NEUTS BAND # BLD: 12 % — HIGH (ref 0–8)
NITRITE UR-MCNC: NEGATIVE — SIGNIFICANT CHANGE UP
NRBC # BLD: 0 /100 — SIGNIFICANT CHANGE UP (ref 0–0)
NRBC # BLD: SIGNIFICANT CHANGE UP /100 WBCS (ref 0–0)
NT-PROBNP SERPL-SCNC: 154 PG/ML — SIGNIFICANT CHANGE UP (ref 0–450)
PH UR: 6 — SIGNIFICANT CHANGE UP (ref 5–8)
PLAT MORPH BLD: NORMAL — SIGNIFICANT CHANGE UP
PLATELET # BLD AUTO: 157 K/UL — SIGNIFICANT CHANGE UP (ref 150–400)
POTASSIUM SERPL-MCNC: 3.2 MMOL/L — LOW (ref 3.5–5.3)
POTASSIUM SERPL-SCNC: 3.2 MMOL/L — LOW (ref 3.5–5.3)
PROT UR-MCNC: 300 MG/DL — SIGNIFICANT CHANGE UP
RBC # BLD: 4.2 M/UL — SIGNIFICANT CHANGE UP (ref 3.8–5.2)
RBC # FLD: 15.1 % — HIGH (ref 10.3–14.5)
RBC BLD AUTO: SIGNIFICANT CHANGE UP
RBC CASTS # UR COMP ASSIST: ABNORMAL /HPF (ref 0–4)
SODIUM SERPL-SCNC: 137 MMOL/L — SIGNIFICANT CHANGE UP (ref 135–145)
SP GR SPEC: 1.01 — SIGNIFICANT CHANGE UP (ref 1.01–1.02)
SPECIMEN SOURCE: SIGNIFICANT CHANGE UP
SPECIMEN SOURCE: SIGNIFICANT CHANGE UP
UROBILINOGEN FLD QL: NEGATIVE — SIGNIFICANT CHANGE UP
WBC # BLD: 23.43 K/UL — HIGH (ref 3.8–10.5)
WBC # FLD AUTO: 23.43 K/UL — HIGH (ref 3.8–10.5)
WBC UR QL: SIGNIFICANT CHANGE UP /HPF (ref 0–5)

## 2023-09-18 PROCEDURE — 82962 GLUCOSE BLOOD TEST: CPT

## 2023-09-18 PROCEDURE — 80053 COMPREHEN METABOLIC PANEL: CPT

## 2023-09-18 PROCEDURE — 85027 COMPLETE CBC AUTOMATED: CPT

## 2023-09-18 PROCEDURE — 71250 CT THORAX DX C-: CPT | Mod: 26

## 2023-09-18 PROCEDURE — 97116 GAIT TRAINING THERAPY: CPT | Mod: GP

## 2023-09-18 PROCEDURE — 36415 COLL VENOUS BLD VENIPUNCTURE: CPT

## 2023-09-18 PROCEDURE — 97530 THERAPEUTIC ACTIVITIES: CPT | Mod: GP

## 2023-09-18 PROCEDURE — 85025 COMPLETE CBC W/AUTO DIFF WBC: CPT

## 2023-09-18 PROCEDURE — 99223 1ST HOSP IP/OBS HIGH 75: CPT

## 2023-09-18 PROCEDURE — 99497 ADVNCD CARE PLAN 30 MIN: CPT | Mod: 25

## 2023-09-18 PROCEDURE — 97162 PT EVAL MOD COMPLEX 30 MIN: CPT | Mod: GP

## 2023-09-18 PROCEDURE — 71250 CT THORAX DX C-: CPT

## 2023-09-18 PROCEDURE — 94640 AIRWAY INHALATION TREATMENT: CPT

## 2023-09-18 PROCEDURE — 83036 HEMOGLOBIN GLYCOSYLATED A1C: CPT

## 2023-09-18 PROCEDURE — 71045 X-RAY EXAM CHEST 1 VIEW: CPT

## 2023-09-18 PROCEDURE — 80048 BASIC METABOLIC PNL TOTAL CA: CPT

## 2023-09-18 PROCEDURE — 83605 ASSAY OF LACTIC ACID: CPT

## 2023-09-18 PROCEDURE — 83735 ASSAY OF MAGNESIUM: CPT

## 2023-09-18 RX ORDER — SODIUM CHLORIDE 9 MG/ML
1000 INJECTION INTRAMUSCULAR; INTRAVENOUS; SUBCUTANEOUS
Refills: 0 | Status: DISCONTINUED | OUTPATIENT
Start: 2023-09-18 | End: 2023-09-22

## 2023-09-18 RX ORDER — DEXTROSE 50 % IN WATER 50 %
25 SYRINGE (ML) INTRAVENOUS ONCE
Refills: 0 | Status: DISCONTINUED | OUTPATIENT
Start: 2023-09-18 | End: 2023-09-24

## 2023-09-18 RX ORDER — AMLODIPINE BESYLATE 2.5 MG/1
5 TABLET ORAL DAILY
Refills: 0 | Status: DISCONTINUED | OUTPATIENT
Start: 2023-09-18 | End: 2023-09-22

## 2023-09-18 RX ORDER — SODIUM CHLORIDE 9 MG/ML
1000 INJECTION INTRAMUSCULAR; INTRAVENOUS; SUBCUTANEOUS ONCE
Refills: 0 | Status: COMPLETED | OUTPATIENT
Start: 2023-09-18 | End: 2023-09-18

## 2023-09-18 RX ORDER — ACETAMINOPHEN 500 MG
650 TABLET ORAL EVERY 6 HOURS
Refills: 0 | Status: DISCONTINUED | OUTPATIENT
Start: 2023-09-18 | End: 2023-09-24

## 2023-09-18 RX ORDER — DRONABINOL 2.5 MG
1 CAPSULE ORAL
Qty: 0 | Refills: 0 | DISCHARGE

## 2023-09-18 RX ORDER — MEMANTINE HYDROCHLORIDE 10 MG/1
10 TABLET ORAL
Refills: 0 | Status: DISCONTINUED | OUTPATIENT
Start: 2023-09-18 | End: 2023-09-24

## 2023-09-18 RX ORDER — SIMVASTATIN 20 MG/1
1 TABLET, FILM COATED ORAL
Qty: 0 | Refills: 0 | DISCHARGE

## 2023-09-18 RX ORDER — SODIUM CHLORIDE 9 MG/ML
1000 INJECTION, SOLUTION INTRAVENOUS
Refills: 0 | Status: DISCONTINUED | OUTPATIENT
Start: 2023-09-18 | End: 2023-09-24

## 2023-09-18 RX ORDER — DEXTROSE 50 % IN WATER 50 %
12.5 SYRINGE (ML) INTRAVENOUS ONCE
Refills: 0 | Status: DISCONTINUED | OUTPATIENT
Start: 2023-09-18 | End: 2023-09-24

## 2023-09-18 RX ORDER — DEXTROSE 50 % IN WATER 50 %
15 SYRINGE (ML) INTRAVENOUS ONCE
Refills: 0 | Status: DISCONTINUED | OUTPATIENT
Start: 2023-09-18 | End: 2023-09-24

## 2023-09-18 RX ORDER — GLUCAGON INJECTION, SOLUTION 0.5 MG/.1ML
1 INJECTION, SOLUTION SUBCUTANEOUS ONCE
Refills: 0 | Status: DISCONTINUED | OUTPATIENT
Start: 2023-09-18 | End: 2023-09-24

## 2023-09-18 RX ORDER — METFORMIN HYDROCHLORIDE 850 MG/1
1 TABLET ORAL
Qty: 0 | Refills: 0 | DISCHARGE

## 2023-09-18 RX ORDER — ONDANSETRON 8 MG/1
4 TABLET, FILM COATED ORAL EVERY 8 HOURS
Refills: 0 | Status: DISCONTINUED | OUTPATIENT
Start: 2023-09-18 | End: 2023-09-24

## 2023-09-18 RX ORDER — LANOLIN ALCOHOL/MO/W.PET/CERES
3 CREAM (GRAM) TOPICAL AT BEDTIME
Refills: 0 | Status: DISCONTINUED | OUTPATIENT
Start: 2023-09-18 | End: 2023-09-24

## 2023-09-18 RX ORDER — AZITHROMYCIN 500 MG/1
500 TABLET, FILM COATED ORAL EVERY 24 HOURS
Refills: 0 | Status: DISCONTINUED | OUTPATIENT
Start: 2023-09-18 | End: 2023-09-20

## 2023-09-18 RX ORDER — DONEPEZIL HYDROCHLORIDE 10 MG/1
1 TABLET, FILM COATED ORAL
Qty: 0 | Refills: 0 | DISCHARGE

## 2023-09-18 RX ORDER — INSULIN GLARGINE 100 [IU]/ML
5 INJECTION, SOLUTION SUBCUTANEOUS AT BEDTIME
Refills: 0 | Status: DISCONTINUED | OUTPATIENT
Start: 2023-09-18 | End: 2023-09-22

## 2023-09-18 RX ORDER — INSULIN LISPRO 100/ML
VIAL (ML) SUBCUTANEOUS AT BEDTIME
Refills: 0 | Status: DISCONTINUED | OUTPATIENT
Start: 2023-09-18 | End: 2023-09-24

## 2023-09-18 RX ORDER — HEPARIN SODIUM 5000 [USP'U]/ML
5000 INJECTION INTRAVENOUS; SUBCUTANEOUS EVERY 12 HOURS
Refills: 0 | Status: DISCONTINUED | OUTPATIENT
Start: 2023-09-18 | End: 2023-09-24

## 2023-09-18 RX ORDER — IPRATROPIUM/ALBUTEROL SULFATE 18-103MCG
3 AEROSOL WITH ADAPTER (GRAM) INHALATION EVERY 6 HOURS
Refills: 0 | Status: DISCONTINUED | OUTPATIENT
Start: 2023-09-18 | End: 2023-09-21

## 2023-09-18 RX ORDER — INSULIN LISPRO 100/ML
VIAL (ML) SUBCUTANEOUS
Refills: 0 | Status: DISCONTINUED | OUTPATIENT
Start: 2023-09-18 | End: 2023-09-24

## 2023-09-18 RX ORDER — MEMANTINE HYDROCHLORIDE 10 MG/1
1 TABLET ORAL
Qty: 0 | Refills: 0 | DISCHARGE

## 2023-09-18 RX ORDER — PIPERACILLIN AND TAZOBACTAM 4; .5 G/20ML; G/20ML
3.38 INJECTION, POWDER, LYOPHILIZED, FOR SOLUTION INTRAVENOUS EVERY 8 HOURS
Refills: 0 | Status: DISCONTINUED | OUTPATIENT
Start: 2023-09-18 | End: 2023-09-20

## 2023-09-18 RX ORDER — CEFTRIAXONE 500 MG/1
1000 INJECTION, POWDER, FOR SOLUTION INTRAMUSCULAR; INTRAVENOUS EVERY 24 HOURS
Refills: 0 | Status: DISCONTINUED | OUTPATIENT
Start: 2023-09-18 | End: 2023-09-18

## 2023-09-18 RX ORDER — ALBUTEROL 90 UG/1
2 AEROSOL, METERED ORAL EVERY 4 HOURS
Refills: 0 | Status: DISCONTINUED | OUTPATIENT
Start: 2023-09-18 | End: 2023-09-21

## 2023-09-18 RX ADMIN — AMLODIPINE BESYLATE 5 MILLIGRAM(S): 2.5 TABLET ORAL at 09:59

## 2023-09-18 RX ADMIN — AZITHROMYCIN 255 MILLIGRAM(S): 500 TABLET, FILM COATED ORAL at 03:31

## 2023-09-18 RX ADMIN — Medication 6: at 09:13

## 2023-09-18 RX ADMIN — Medication 3 MILLILITER(S): at 07:58

## 2023-09-18 RX ADMIN — HEPARIN SODIUM 5000 UNIT(S): 5000 INJECTION INTRAVENOUS; SUBCUTANEOUS at 09:59

## 2023-09-18 RX ADMIN — Medication 1000 MILLIGRAM(S): at 03:21

## 2023-09-18 RX ADMIN — Medication 40 MILLIEQUIVALENT(S): at 03:24

## 2023-09-18 RX ADMIN — SODIUM CHLORIDE 1000 MILLILITER(S): 9 INJECTION INTRAMUSCULAR; INTRAVENOUS; SUBCUTANEOUS at 07:05

## 2023-09-18 RX ADMIN — MEMANTINE HYDROCHLORIDE 10 MILLIGRAM(S): 10 TABLET ORAL at 09:59

## 2023-09-18 RX ADMIN — PIPERACILLIN AND TAZOBACTAM 200 GRAM(S): 4; .5 INJECTION, POWDER, LYOPHILIZED, FOR SOLUTION INTRAVENOUS at 03:08

## 2023-09-18 RX ADMIN — PIPERACILLIN AND TAZOBACTAM 25 GRAM(S): 4; .5 INJECTION, POWDER, LYOPHILIZED, FOR SOLUTION INTRAVENOUS at 14:52

## 2023-09-18 RX ADMIN — INSULIN GLARGINE 5 UNIT(S): 100 INJECTION, SOLUTION SUBCUTANEOUS at 21:16

## 2023-09-18 RX ADMIN — MEMANTINE HYDROCHLORIDE 10 MILLIGRAM(S): 10 TABLET ORAL at 21:18

## 2023-09-18 RX ADMIN — HEPARIN SODIUM 5000 UNIT(S): 5000 INJECTION INTRAVENOUS; SUBCUTANEOUS at 21:16

## 2023-09-18 RX ADMIN — CEFTRIAXONE 1000 MILLIGRAM(S): 500 INJECTION, POWDER, FOR SOLUTION INTRAMUSCULAR; INTRAVENOUS at 03:26

## 2023-09-18 RX ADMIN — Medication 3 MILLILITER(S): at 20:49

## 2023-09-18 RX ADMIN — PIPERACILLIN AND TAZOBACTAM 25 GRAM(S): 4; .5 INJECTION, POWDER, LYOPHILIZED, FOR SOLUTION INTRAVENOUS at 21:18

## 2023-09-18 RX ADMIN — Medication 4: at 13:11

## 2023-09-18 RX ADMIN — Medication 3 MILLIGRAM(S): at 21:19

## 2023-09-18 NOTE — H&P ADULT - CONVERSATION DETAILS
I had a detailed discussion with the patient and her daughter regarding their goals of care. We discussed that cardiopulmonary resuscitation (CPR) can be completed if the patient were to go into cardiac arrest. This includes chest compressions and delivering shocks if needed to restart their heart and intubation/mechanical ventilation to maintain their airway. The risks of CPR were discussed with the patient including rib fractures, damage to internal organs, and the possibility of anoxic brain injury or increased physical debility. The pt will remain a full code overnight. Her daughter will bring in her living will for review. She does not want to fill out a MOLST until she reads the pt's living will. Agreeable to speak with palliative care for further GOC discussions.

## 2023-09-18 NOTE — CONSULT NOTE ADULT - ASSESSMENT
88 year old female with PMH of dementia (A+Ox2 at baseline), HTN, Type 2 DM (not on medications currently, diet controlled), lower extremity swelling presents with weakness. per pt's daughter she returned home from dinner and a few hours later she found that her mothers slid off the couch and was slumped onto the floor. She had decreased energy, chills, cough productive of mucus, swelling of her lower extremities which is chronic and abdominal pain.  ER course: Temperature 103.1, heart rate 108-113, SPO2 93% on room air -> 93 to 95% on 3 L nasal cannula. Labs: Neutrophils 88% metamyelocytes 1% myelocytes 1%, potassium 3.3, creatinine 1.57 (baseline 0.8), glucose 287, lactate 3.5.  UA small blood, RBC 3-5, many bacteria.  COVID and RVP negative. CT abd/pelvis: Colonic diverticulosis without evidence of acute diverticulitis. Cholelithiasis without CT evidence of acute cholecystitis.  Patient was given Zosyn, 2200 mL of normal saline, IV Tylenol, 40 mEq of PO potassium.  She was admitted to med/surg for further management.     1. Sepsis. Aspiration pneumonitis/pna ? Pyuria. Possible UTI. Metabolic encephalopathy. LORY  - imaging reviewed, suggest ct chest  - f/u urine, blood cx  - change rocephin to IV zosyn [id number]  - on azithromycin 500mg daily  - continue with antibiotic coverage  - iv hydration  - monitor temps  - tolerating abx well so far; no side effects noted  - reason for abx use and side effects reviewed with patient  - supportive care  - fu cbc    2. other issues - care per medicine

## 2023-09-18 NOTE — PATIENT PROFILE ADULT - FUNCTIONAL ASSESSMENT - BASIC MOBILITY 6.
1-calculated by average/Not able to assess (calculate score using [facility] averaging method)

## 2023-09-18 NOTE — CONSULT NOTE ADULT - PROBLEM/RECOMMENDATION-1
Patient presents to clinic today for tooth problem. He states he had a tooth break the other day.     No LMP for male patient.  Medication Reconciliation: complete    Estrella Torres LPN  6/10/2019 2:35 PM     DISPLAY PLAN FREE TEXT

## 2023-09-18 NOTE — CONSULT NOTE ADULT - ASSESSMENT
Process of Care  --Reviewed dx/treatment problems and alignment with Goals of Care         memantine 10 milliGRAM(s) Oral two times a day      MEDICATIONS  (PRN):      aluminum hydroxide/magnesium hydroxide/simethicone Suspension 30 milliLiter(s) Oral every 4 hours PRN Dyspepsia      Cough   guaiFENesin ER 1200 milliGRAM(s) Oral every 12 hours PRN Cough  benzonatate 100 milliGRAM(s) Oral every 8 hours PRN Cough         Physical Aspects of Care  --Pain  patient denies at this time  c/w current managment    --Bowel Regimen  denies constipation  risk for constipation d/t immobility  daily dulcolax    --Dyspnea  No SOB at this time  comfortable and in NAD    --Nausea Vomiting  denies  zofran PRN     --Weakness  PT as tolerated     Psychological and Psychiatric Aspects of Care:   --Greif/Bereavment: emotional support provided  --Hx of psychiatric dx: none  -Pastoral Care Available PRN     Delirium PPX  Recommendations:   -Initiate melatonin 5mg qhs, to promote maintenance of circadian rhythm/restful sleep, and for ppx of future susceptibility to delirium upon resolution of presenting condition.  -Avoid deliriogenic agents including BZD, anticholinergics, and sedating agents if possible  -Re-orient frequently, encourage family at bedside as able.   -Early mobilization recommended if feasible.     Social Aspects of Care  -SW involved     Cultural Aspects  -Primary Language: English    Goals of Care:     We discussed Palliative Care team being a supportive team when a patient has ongoing illnesses.  We also discussed that it is not an end of life care service, but can help navigate symptoms and emotional support througout their hospital stay here.      Patient defers all discussions to her songcliff Lester at this time.         Ethical and Legal Aspects:   NA        Capacity: pt w/ simple capacity  HCP/Surrogate: ABHINAV daughter  Code Status: FULL CODE  MOLST: na  Dispo Plan: pending improvment hopes to go home    Discussed With: Case coordinated with attending and SW and RN     Time Spent: 50 minutes including the care, coordination and counseling of this patient, 50% of which was spent coordinating and counseling.  Process of Care  --Reviewed dx/treatment problems and alignment with Goals of Care    Cough   guaiFENesin ER 1200 milliGRAM(s) Oral every 12 hours PRN Cough  benzonatate 100 milliGRAM(s) Oral every 8 hours PRN Cough         Physical Aspects of Care  --Pain  patient denies at this time  c/w current managment    --Bowel Regimen  denies constipation  risk for constipation d/t immobility  daily dulcolax    --Dyspnea  No SOB at this time  comfortable and in NAD    --Nausea Vomiting  denies  zofran PRN     --Weakness  PT as tolerated     Psychological and Psychiatric Aspects of Care:   --Greif/Bereavment: emotional support provided  --Hx of psychiatric dx: none  -Pastoral Care Available PRN     Delirium PPX  Recommendations:   -Initiate melatonin 5mg qhs, to promote maintenance of circadian rhythm/restful sleep, and for ppx of future susceptibility to delirium upon resolution of presenting condition.  -Avoid deliriogenic agents including BZD, anticholinergics, and sedating agents if possible  -Re-orient frequently, encourage family at bedside as able.   -Early mobilization recommended if feasible.     Social Aspects of Care  -SW involved     Cultural Aspects  -Primary Language: English    Goals of Care:     We discussed Palliative Care team being a supportive team when a patient has ongoing illnesses.  We also discussed that it is not an end of life care service, but can help navigate symptoms and emotional support througout their hospital stay here.    Franci and i discussed advanced directives and goals of care  c/w Current measures- FULL CODE TEMPORARY INTUBATION   will follow up       Ethical and Legal Aspects:   NA        Capacity: pt w/ simple capacity  HCP/Surrogate: FRANCI daughter  Code Status: FULL CODE  MOLST: na  Dispo Plan: pending improvement hopes to go home    Discussed With: Case coordinated with attending and SW and RN     Time Spent: 70 minutes including the care, coordination and counseling of this patient, 50% of which was spent coordinating and counseling.

## 2023-09-18 NOTE — CONSULT NOTE ADULT - SUBJECTIVE AND OBJECTIVE BOX
Patient is a 88y old  Female who presents with a chief complaint of Weakness (18 Sep 2023 12:53)    HPI:  88 year old female with PMH of dementia (A+Ox2 at baseline), HTN, Type 2 DM (not on medications currently, diet controlled), lower extremity swelling presents with weakness. per pt's daughter she returned home from dinner and a few hours later she found that her mothers slid off the couch and was slumped onto the floor. She had decreased energy, chills, cough productive of mucus, swelling of her lower extremities which is chronic and abdominal pain.  ER course: Temperature 103.1, heart rate 108-113, SPO2 93% on room air -> 93 to 95% on 3 L nasal cannula. Labs: Neutrophils 88% metamyelocytes 1% myelocytes 1%, potassium 3.3, creatinine 1.57 (baseline 0.8), glucose 287, lactate 3.5.  UA small blood, RBC 3-5, many bacteria.  COVID and RVP negative. CT abd/pelvis: Colonic diverticulosis without evidence of acute diverticulitis. Cholelithiasis without CT evidence of acute cholecystitis.  Patient was given Zosyn, 2200 mL of normal saline, IV Tylenol, 40 mEq of PO potassium.  She was admitted to med/surg for further management.       PMH: as above  PSH: as above  Meds: per reconciliation sheet, noted below  MEDICATIONS  (STANDING):  albuterol    90 MICROgram(s) HFA Inhaler 2 Puff(s) Inhalation every 4 hours  albuterol/ipratropium for Nebulization 3 milliLiter(s) Nebulizer every 6 hours  amLODIPine   Tablet 5 milliGRAM(s) Oral daily  azithromycin  IVPB 500 milliGRAM(s) IV Intermittent every 24 hours  dextrose 5%. 1000 milliLiter(s) (100 mL/Hr) IV Continuous <Continuous>  dextrose 5%. 1000 milliLiter(s) (50 mL/Hr) IV Continuous <Continuous>  dextrose 50% Injectable 25 Gram(s) IV Push once  dextrose 50% Injectable 12.5 Gram(s) IV Push once  dextrose 50% Injectable 25 Gram(s) IV Push once  glucagon  Injectable 1 milliGRAM(s) IntraMuscular once  heparin   Injectable 5000 Unit(s) SubCutaneous every 12 hours  hydrochlorothiazide 25 milliGRAM(s) Oral daily  insulin glargine Injectable (LANTUS) 5 Unit(s) SubCutaneous at bedtime  insulin lispro (ADMELOG) corrective regimen sliding scale   SubCutaneous three times a day before meals  insulin lispro (ADMELOG) corrective regimen sliding scale   SubCutaneous at bedtime  memantine 10 milliGRAM(s) Oral two times a day  piperacillin/tazobactam IVPB.. 3.375 Gram(s) IV Intermittent every 8 hours  sodium chloride 0.9%. 1000 milliLiter(s) (75 mL/Hr) IV Continuous <Continuous>    Allergies    No Known Allergies    Intolerances      Social: no smoking, no alcohol, no illegal drugs; no recent travel, no exposure to TB  FAMILY HISTORY:  FH: heart disease (Father)       no history of premature cardiovascular disease in first degree relatives    ROS: +fever, chills, no HA, no dizziness, no sore throat, no blurry vision, no CP, no palpitations, no SOB, + cough, no abdominal pain, no diarrhea, no N/V, no dysuria, no leg pain, no claudication, no rash, no joint aches, no rectal pain or bleeding, no night sweats    All other systems reviewed and are negative    Vital Signs Last 24 Hrs  T(C): 36.3 (18 Sep 2023 08:46), Max: 39.5 (17 Sep 2023 21:03)  T(F): 97.4 (18 Sep 2023 08:46), Max: 103.1 (17 Sep 2023 21:03)  HR: 86 (18 Sep 2023 08:46) (81 - 113)  BP: 102/45 (18 Sep 2023 08:46) (102/45 - 156/69)  BP(mean): 65 (18 Sep 2023 03:30) (65 - 106)  RR: 18 (18 Sep 2023 08:46) (17 - 28)  SpO2: 92% (18 Sep 2023 08:46) (92% - 95%)    Parameters below as of 18 Sep 2023 08:46  Patient On (Oxygen Delivery Method): room air      Daily Height in cm: 162.56 (17 Sep 2023 21:03)    Daily Weight in k.9 (18 Sep 2023 04:54)    PE:  Constitutional: NAD   HEENT: NC/AT, EOMI, PERRLA, conjunctivae clear; ears and nose atraumatic; pharynx benign  Neck: supple; thyroid not palpable  Back: no tenderness  Respiratory: decreased breath sounds   Cardiovascular: S1S2 regular, no murmurs  Abdomen: soft, not tender, not distended, positive BS; liver and spleen WNL  Genitourinary: no suprapubic tenderness  Lymphatic: no LN palpable  Musculoskeletal: no muscle tenderness, no joint swelling or tenderness  Extremities: no pedal edema  Neurological/ Psychiatric: AxOx3, Judgement and insight normal;  moving all extremities  Skin: no rashes; no palpable lesions    Labs: all available labs reviewed                        .3   23.43 )-----------( 157      ( 18 Sep 2023 06:28 )             37.5         137  |  105  |  35<H>  ----------------------------<  365<H>  3.2<L>   |  21<L>  |  1.82<H>    Ca    8.3<L>      18 Sep 2023 06:28  Mg     1.8         TPro  7.3  /  Alb  3.3  /  TBili  0.8  /  DBili  x   /  AST  21  /  ALT  22  /  AlkPhos  107       LIVER FUNCTIONS - ( 17 Sep 2023 21:13 )  Alb: 3.3 g/dL / Pro: 7.3 gm/dL / ALK PHOS: 107 U/L / ALT: 22 U/L / AST: 21 U/L / GGT: x           Urinalysis Basic - ( 18 Sep 2023 06:28 )    Color: x / Appearance: x / SG: x / pH: x  Gluc: 365 mg/dL / Ketone: x  / Bili: x / Urobili: x   Blood: x / Protein: x / Nitrite: x   Leuk Esterase: x / RBC: x / WBC x   Sq Epi: x / Non Sq Epi: x / Bacteria: x          Radiology: all available radiological tests reviewed  < from: CT Abdomen and Pelvis w/ IV Cont (23 @ 22:42) >    ACC: [id number] EXAM:  CT ABDOMEN AND PELVIS IC   ORDERED BY: MELISSA HU     PROCEDURE DATE:  2023          INTERPRETATION:  CLINICAL INFORMATION: Abdominal pain, fever    COMPARISON: None.    CONTRAST/COMPLICATIONS:  IV Contrast: Omnipaque [id number] cc administered   10 cc discarded  Oral Contrast: NONE  Complications: None reported at time of study completion    PROCEDURE:  CT of the Abdomen and Pelvis was performed.  Sagittal and coronal reformats were performed.    FINDINGS:  LOWER CHEST: Coronary artery calcifications versus stenting. Aortic valve   leaflet calcifications. Bibasilar subsegmental atelectasis.    LIVER: Within normal limits.  BILE DUCTS: Normal caliber.  GALLBLADDER: Cholelithiasis.  SPLEEN: Within normal limits.  PANCREAS: Mildly atrophic.  ADRENALS: Within normal limits.  KIDNEYS/URETERS: Bilateral renal cysts and low-density lesions too small   to characterize. Bilateral parapelvic cysts.    BLADDER: Contrast within the urinary bladder.  REPRODUCTIVE ORGANS: Uterus and adnexa within normal limits. 2.9 cm   simple appearing left adnexal cyst, likely benign.    BOWEL: No bowel obstruction. Moderate colonic stool burden. Colonic   diverticulosis without diverticulitis. Appendix is normal.  PERITONEUM: No ascites.  VESSELS: Atherosclerotic changes.  RETROPERITONEUM/LYMPH NODES: No lymphadenopathy.  ABDOMINAL WALL: Within normal limits.  BONES: Degenerative changes.    IMPRESSION:  Colonic diverticulosis without evidence of acute diverticulitis.    Cholelithiasis without CT evidence of acute cholecystitis.        --- End of Report ---    < end of copied text >  < from: Xray Chest 1 View- PORTABLE-Urgent (23 @ 22:34) >  ACC: [id number] EXAM:  XR CHEST PORTABLE URGENT 1V   ORDERED BY: MELISSA ALI     ACC: [id number] EXAM:  XR PELVIS AP ONLY 1-2 VIEWS   ORDERED BY: MELISSA HU     PROCEDURE DATE:  2023          INTERPRETATION:  Pelvis and chest. Patient has sepsis.    Pelvis.    Degenerative loss of disc height at L4-5 noted.    Hips are rather free of degeneration. No fracture. Contrast in the   bladder from CAT scan noted.    AP chest on 2023 at 10:29 PM.    Heart magnified by technique.    There are scattered infiltrates mainly in the midlung fields of uncertain   etiology. These infiltrates are essentially new since May 13, 2020.    IMPRESSION: Scattered perihilar infiltrates at this time. No fracture.    --- End of Report ---        < end of copied text >    Advanced directives addressed: full resuscitation

## 2023-09-18 NOTE — PHYSICAL THERAPY INITIAL EVALUATION ADULT - GENERAL OBSERVATIONS, REHAB EVAL
Pt seen for 45min PT Eval. Pt rec'd sitting in chair in NAD, +IV, confused, dtr bedside, trans and amb around room with handheld assist. Pt dem shuffling  gait, unsteady wih turns, dec endurance, noted tremors. Pt left sitting in chair in NAD all needs met, RN aware, +chair alarm.

## 2023-09-18 NOTE — PHYSICAL THERAPY INITIAL EVALUATION ADULT - ADDITIONAL COMMENTS
pt has HHA during the day, dtr assists in evening. has RW and rollator at home uses as needed as per dtr.

## 2023-09-18 NOTE — PROGRESS NOTE ADULT - SUBJECTIVE AND OBJECTIVE BOX
Reason for Admission: Weakness    Patient is a 88 year old female with PMH of dementia (A+Ox2 at baseline), HTN, Type 2 DM (not on medications currently, diet controlled), lower extremity swelling presents with weakness. I spoke to the pt's daughter at the bedside. She states that the pt was doing well yesterday. She returned home from dinner and a few hours later she found that her mothers slid off the couch and was slumped onto the floor. She had decreased energy, chills, cough productive of mucus, swelling of her lower extremities which is chronic and abdominal pain.      ER course: Temperature 103.1, heart rate 108-113, SPO2 93% on room air -> 93 to 95% on 3 L nasal cannula. Labs: Neutrophils 88% metamyelocytes 1% myelocytes 1%, potassium 3.3, creatinine 1.57 (baseline 0.8), glucose 287, lactate 3.5.  UA small blood, RBC 3-5, many bacteria.  COVID and RVP negative.    REVIEW OF SYSTEMS:  General: NAD, hemodynamically stable   HEENT:  Eyes:  No visual loss   CARDIOVASCULAR:  No chest pain   RESPIRATORY:  No shortness of breath   GASTROINTESTINAL:  No anorexia   NEUROLOGICAL:  No headache   MUSCULOSKELETAL:  No muscle   HEMATOLOGIC:  No anemia   LYMPHATICS:  No enlarged nodes  ENDOCRINOLOGIC:  No reports of sweating, cold or heat intolerance. No polyuria or polydipsia.  ALLERGIES:  No history of asthma, hives, eczema or rhinitis.    Physical Exam:   GENERAL APPEARANCE:  NAD, hemodynamically stable  T(C): 36.3 (09-18-23 @ 08:46), Max: 39.5 (09-17-23 @ 21:03)  HR: 86 (09-18-23 @ 08:46) (81 - 113)  BP: 102/45 (09-18-23 @ 08:46) (102/45 - 156/69)  RR: 18 (09-18-23 @ 08:46) (17 - 28)  SpO2: 92% (09-18-23 @ 08:46) (92% - 95%)  HEENT:  Head is normocephalic    Skin:  Warm and dry without any rash   NECK:  Supple without lymphadenopathy.   HEART:  Regular rate and rhythm. normal S1 and S2, No M/R/G  LUNGS:  Good ins/exp effort, no W/R/R/C  ABDOMEN:  Soft, nontender, nondistended with good bowel sounds heard  EXTREMITIES:  Without cyanosis, clubbing or edema.   NEUROLOGICAL:  Gross nonfocal       CBC Full  -  ( 18 Sep 2023 06:28 )  WBC Count : 23.43 K/uL  RBC Count : 4.20 M/uL  Hemoglobin : 12.3 g/dL  Hematocrit : 37.5 %  Platelet Count - Automated : 157 K/uL  Mean Cell Volume : 89.3 fl  Mean Cell Hemoglobin : 29.3 pg  Mean Cell Hemoglobin Concentration : 32.8 gm/dL  Auto Neutrophil # : 19.68 K/uL  Auto Lymphocyte # : 1.41 K/uL  Auto Monocyte # : 1.17 K/uL  Auto Eosinophil # : 0.00 K/uL  Auto Basophil # : 0.00 K/uL  Auto Neutrophil % : 72.0 %  Auto Lymphocyte % : 6.0 %  Auto Monocyte % : 5.0 %  Auto Eosinophil % : 0.0 %  Auto Basophil % : 0.0 %    PT/INR - ( 17 Sep 2023 21:13 )   PT: 10.6 sec;   INR: 0.94 ratio         PTT - ( 17 Sep 2023 21:13 )  PTT:21.7 sec  Urinalysis Basic - ( 18 Sep 2023 06:28 )    Color: x / Appearance: x / SG: x / pH: x  Gluc: 365 mg/dL / Ketone: x  / Bili: x / Urobili: x   Blood: x / Protein: x / Nitrite: x   Leuk Esterase: x / RBC: x / WBC x   Sq Epi: x / Non Sq Epi: x / Bacteria: x      137  |  105  |  35<H>  ----------------------------<  365<H>  3.2<L>   |  21<L>  |  1.82<H>    Ca    8.3<L>      18 Sep 2023 06:28  Mg     1.8     09-18    TPro  7.3  /  Alb  3.3  /  TBili  0.8  /  DBili  x   /  AST  21  /  ALT  22  /  AlkPhos  107  09-17           Reason for Admission: Weakness    Patient is a 88 year old female with PMH of dementia (A+Ox2 at baseline), HTN, Type 2 DM (not on medications currently, diet controlled), lower extremity swelling presents with weakness. I spoke to the pt's daughter at the bedside. She states that the pt was doing well yesterday. She returned home from dinner and a few hours later she found that her mothers slid off the couch and was slumped onto the floor. She had decreased energy, chills, cough productive of mucus, swelling of her lower extremities which is chronic and abdominal pain.      ER course: Temperature 103.1, heart rate 108-113, SPO2 93% on room air -> 93 to 95% on 3 L nasal cannula. Labs: Neutrophils 88% metamyelocytes 1% myelocytes 1%, potassium 3.3, creatinine 1.57 (baseline 0.8), glucose 287, lactate 3.5.  UA small blood, RBC 3-5, many bacteria.  COVID and RVP negative.    Patient is sitting up. comfortable. No signs of infection - this being said - > patient has a high lactate / high WBC. Care discussed with Patient's daughter.       REVIEW OF SYSTEMS:  General: NAD, hemodynamically stable   HEENT:  Eyes:  No visual loss   CARDIOVASCULAR:  No chest pain   RESPIRATORY:  No shortness of breath   GASTROINTESTINAL:  No anorexia   NEUROLOGICAL:  No headache   MUSCULOSKELETAL:  No muscle   HEMATOLOGIC:  No anemia   LYMPHATICS:  No enlarged nodes  ENDOCRINOLOGIC:  No reports of sweating, cold or heat intolerance. No polyuria or polydipsia.  ALLERGIES:  No history of asthma, hives, eczema or rhinitis.    Physical Exam:   GENERAL APPEARANCE:  very deconditioned, frail, sick  T(C): 36.3 (09-18-23 @ 08:46), Max: 39.5 (09-17-23 @ 21:03)  HR: 86 (09-18-23 @ 08:46) (81 - 113)  BP: 102/45 (09-18-23 @ 08:46) (102/45 - 156/69)  RR: 18 (09-18-23 @ 08:46) (17 - 28)  SpO2: 92% (09-18-23 @ 08:46) (92% - 95%)  HEENT:  Head is normocephalic    Skin:  Warm and dry without any rash   NECK:  Supple without lymphadenopathy.   HEART:  Regular rate and rhythm. normal S1 and S2, No M/R/G  LUNGS:  Good ins/exp effort, no W/R/R/C  ABDOMEN:  Soft, nontender, nondistended with good bowel sounds heard  EXTREMITIES:  Without cyanosis, clubbing or edema.   NEUROLOGICAL:  Gross nonfocal       CBC Full  -  ( 18 Sep 2023 06:28 )  WBC Count : 23.43 K/uL  RBC Count : 4.20 M/uL  Hemoglobin : 12.3 g/dL  Hematocrit : 37.5 %  Platelet Count - Automated : 157 K/uL  Mean Cell Volume : 89.3 fl  Mean Cell Hemoglobin : 29.3 pg  Mean Cell Hemoglobin Concentration : 32.8 gm/dL  Auto Neutrophil # : 19.68 K/uL  Auto Lymphocyte # : 1.41 K/uL  Auto Monocyte # : 1.17 K/uL  Auto Eosinophil # : 0.00 K/uL  Auto Basophil # : 0.00 K/uL  Auto Neutrophil % : 72.0 %  Auto Lymphocyte % : 6.0 %  Auto Monocyte % : 5.0 %  Auto Eosinophil % : 0.0 %  Auto Basophil % : 0.0 %    PT/INR - ( 17 Sep 2023 21:13 )   PT: 10.6 sec;   INR: 0.94 ratio         PTT - ( 17 Sep 2023 21:13 )  PTT:21.7 sec  Urinalysis Basic - ( 18 Sep 2023 06:28 )    Color: x / Appearance: x / SG: x / pH: x  Gluc: 365 mg/dL / Ketone: x  / Bili: x / Urobili: x   Blood: x / Protein: x / Nitrite: x   Leuk Esterase: x / RBC: x / WBC x   Sq Epi: x / Non Sq Epi: x / Bacteria: x      137  |  105  |  35<H>  ----------------------------<  365<H>  3.2<L>   |  21<L>  |  1.82<H>    Ca    8.3<L>      18 Sep 2023 06:28  Mg     1.8     09-18    TPro  7.3  /  Alb  3.3  /  TBili  0.8  /  DBili  x   /  AST  21  /  ALT  22  /  AlkPhos  107  09-17        88-year-old female presents with weakness     # Acute hypoxic respiratory distress and sepsis secondary to aspiration pneumonia  # Septic Shock  # Lactic acidosis  # Elevated WBC  - IV hydration  - IV zosyn /  IV zithromax     # Acute kidney injury   - Cr 1.57 (baseline 0.8), monitor closely   - s/p IVF in the ER, c/w gentle hydration with IVF at 75 ml/hr overnight (monitor for fluid overload)   - Avoid nephrotoxic medications   - Strict I+Os     # Hypokalemia   - K+ 3.3, repleted in the ER   - f/u AM K+ and Mg levels     # Hyperglycemia   - Glucose 287, monitor   - Ordered HbA1c   - ISS for now -> add on basal bolus if persistently elevated accuchecks     5. Microscopic hematuria   - UA small blood, RBC 3-5, many bacteria  - Recommennd f/u UA in 4-6 weeks and if persistence of hematuria will need urology f/u     6. History of dementia (A+Ox2 at baseline), HTN, Type 2 DM (not on medications currently, diet controlled), lower extremity swelling   - c/w home medications; verified with pt at the bedside  - Metamyelocytes 1% myelocytes 1%, trend     DVT ppx: Heparin 5,000 units Q12H (hold for PLT <50,000)   Code status: Full code. Palliative care consult   Emergency contact: Franci Wesley (daughter) [phone]

## 2023-09-18 NOTE — H&P ADULT - NSICDXPASTMEDICALHX_GEN_ALL_CORE_FT
PAST MEDICAL HISTORY:  Dementia     DM (diabetes mellitus)     HTN (hypertension)     Swelling of lower extremity     Type 2 diabetes mellitus

## 2023-09-18 NOTE — H&P ADULT - ASSESSMENT
88-year-old female presents with weakness     1. Acute hypoxic respiratory distress and sepsis secondary to pneumonia (r/o UTI, bacteremia)   - Admit to med/surg   - Temperature 103.1, heart rate 108-113, SPO2 93% on room air -> 93 to 95% on 3 L nasal cannula, lactate 3.5   - c/w supplemental O2 to keep SpO2 > 92%   - Tessalon, mucinex, albuterol PRN   - s/p Zosyn in ER; c/w Ceftriaxone and Azithromycin   - f/u UCx, BCx x 2, sputum culture, procalcitonin, reflex lactic acid; adjust abx based on sensitivities  - Trend WBC, monitor for temperatures, Tylenol PRN   - s/p 2200 ml of NS, c/w 75 ml/hr overnight (monitor for fluid overload)   - BNP within normal limits     2. Acute kidney injury   - Cr 1.57 (baseline 0.8), monitor closely   - s/p IVF in the ER, c/w gentle hydration with IVF at 75 ml/hr overnight (monitor for fluid overload)   - Avoid nephrotoxic medications   - Strict I+Os     3. Hypokalemia   - K+ 3.3, repleted in the ER   - f/u AM K+ and Mg levels     4. Hyperglycemia   - Glucose 287, monitor   - Ordered HbA1c   - ISS for now -> add on basal bolus if persistently elevated accuchecks     5. Microscopic hematuria   - UA small blood, RBC 3-5, many bacteria  - Recommennd f/u UA in 4-6 weeks and if persistence of hematuria will need urology f/u     6. History of dementia (A+Ox2 at baseline), HTN, Type 2 DM (not on medications currently, diet controlled), lower extremity swelling   - c/w home medications; verified with pt at the bedside  - Metamyelocytes 1% myelocytes 1%, trend     DVT ppx: Heparin 5,000 units Q12H (hold for PLT <50,000)   Code status: Full code. Palliative care consult   Emergency contact: Franci Wesley (daughter) [phone]     I spent a total of 80 minutes on the date of this encounter coordinating the patient's care. This includes reviewing prior documentation, results and imaging in addition to completing a full history and physical examination on the patient. Further tests, medications, and procedures have been ordered as indicated. Laboratory results and the plan of care were communicated to the patient and/or their family member. Supporting documentation was completed and added to the patient's chart.

## 2023-09-18 NOTE — H&P ADULT - HISTORY OF PRESENT ILLNESS
88 year old female with PMH of dementia (A+Ox2 at baseline), HTN, Type 2 DM (not on medications currently, diet controlled), lower extremity swelling presents with weakness. I spoke to the pt's daughter at the bedside. She states that the pt was doing well yesterday. She returned home from dinner and a few hours later she found that her mothers slid off the couch and was slumped onto the floor. She had decreased energy, chills, cough productive of mucus, swelling of her lower extremities which is chronic and abdominal pain.      ER course: Temperature 103.1, heart rate 108-113, SPO2 93% on room air -> 93 to 95% on 3 L nasal cannula. Labs: Neutrophils 88% metamyelocytes 1% myelocytes 1%, potassium 3.3, creatinine 1.57 (baseline 0.8), glucose 287, lactate 3.5.  UA small blood, RBC 3-5, many bacteria.  COVID and RVP negative.    EKG: pending, f/u result     Imaging:  - PRECONTRAST HEAD CT: No acute findings.  - CTA HEAD: No flow-limiting stenosis, occlusion or aneurysm.  - CTA NECK: 50% narrowing right ICA origin. Otherwise mild atherosclerosis in the neck.  - CT abd/pelvis: Colonic diverticulosis without evidence of acute diverticulitis. Cholelithiasis without CT evidence of acute cholecystitis. Left adnexal cyst 2.9 cm, moderate stool burden.   - CXR: increased vascular congestion vs. multifocal consolidations, cardiomegaly (personally reviewed).   - XR pelvis: no fracture or dislocation (personally reviewed).     Patient was given Zosyn, 2200 mL of normal saline, IV Tylenol, 40 mEq of PO potassium.  She is being admitted to med/surg for further management.

## 2023-09-18 NOTE — PATIENT PROFILE ADULT - FALL HARM RISK - HARM RISK INTERVENTIONS

## 2023-09-18 NOTE — CONSULT NOTE ADULT - CONVERSATION DETAILS
We discussed Palliative Care team being a supportive team when a patient has ongoing illnesses.  We also discussed that it is not an end of life care service, but can help navigate symptoms and emotional support throughout their hospital stay here.         I had a long discussion at bedside w/ patients daughter regarding advanced directives and goals of care.  She started to discuss code status w/ primary team and had decided to try temporary intubation and cpr after she understood that CPR w/out intubation would not be according to ACLS.     We talkeda bout underlying disease and that I do NOT recommend CPR as it would lead to worse quality of life IF it were to be successful as pts dementia would decline signficantly leading to likelyout come of dependent onlife support and feeding tubes- all of which pt made clear in living will she would not want.       Daughter was very appreciative of discussion and wants to think more about these options.   For now pt remains FULL CODE w/ Temporary intubation -     She will consider DNR with temporary intubation

## 2023-09-18 NOTE — PATIENT PROFILE ADULT - NSPROPTRIGHTCAREGIVER_GEN_A_NUR
January 30, 2023  Alee Hull  [address] [place] [address]                [facility]  [address] [place] [address], [address]  [place] [address]  Phone: [phone]  Fax: [phone] Alee Hull was seen and treated in our Urgent Care department on 1/30/2023. She may return to work in 2 - 3 days.      If you have any questions or concerns, please don't hesitate to call.        Sincerely,        Kostas Rader MD            yes

## 2023-09-18 NOTE — H&P ADULT - NSHPPHYSICALEXAM_GEN_ALL_CORE
ICU Vital Signs Last 24 Hrs  T(C): 39.2 (17 Sep 2023 21:44), Max: 39.5 (17 Sep 2023 21:03)  T(F): 102.6 (17 Sep 2023 21:44), Max: 103.1 (17 Sep 2023 21:03)  HR: 108 (17 Sep 2023 22:00) (108 - 113)  BP: 121/86 (17 Sep 2023 22:00) (121/86 - 156/69)  BP(mean): 106 (17 Sep 2023 22:00) (91 - 106)  RR: 25 (17 Sep 2023 22:00) (18 - 28)  SpO2: 95% (17 Sep 2023 22:00) (93% - 95%)    O2 Parameters below as of 17 Sep 2023 22:00  Patient On (Oxygen Delivery Method): nasal cannula  O2 Flow (L/min): 3    General: Awake and alert, cooperative with exam. No acute distress.   Skin: Warm, dry, and pink.   Eyes: Pupils equal and reactive to light. Extraocular eye movements intact. No conjunctival injection, discharge, or scleral icterus.   HEENT: Atraumatic, normocephalic. Moist mucus membranes.   Cardiology: Normal S1, S2. No murmurs, rubs, or gallops. Regular rate and rhythm.   Respiratory: Crackles at the bases b/l. Normal chest expansion.   Gastrointestinal: Positive bowel sounds. Soft, non-tender, non-distended. No guarding, rigidity, or rebound tenderness. No hepatosplenomegaly.   Musculoskeletal: 5/5 motor strength in all extremities. Normal range of motion.   Extremities: No peripheral edema bilaterally. Dorsalis pedis pulses 2+ bilaterally.   Neurological: A+Ox2 (person, place). Cranial nerves 2-12 intact. Normal speech. No facial droop. No focal neurological deficits.   Psychiatric: Normal affect. Normal mood.

## 2023-09-18 NOTE — CONSULT NOTE ADULT - SUBJECTIVE AND OBJECTIVE BOX
HPI:     " 88 year old female with PMH of dementia (A+Ox2 at baseline), HTN, Type 2 DM (not on medications currently, diet controlled), lower extremity swelling presents with weakness. I spoke to the pt's daughter at the bedside. She states that the pt was doing well yesterday. She returned home from dinner and a few hours later she found that her mothers slid off the couch and was slumped onto the floor. She had decreased energy, chills, cough productive of mucus, swelling of her lower extremities which is chronic and abdominal pain.   "               PAIN: ( )Yes   ( )No  Level:  Location:  Intensity:    /10  Quality:  Aggravating Factors:  Alleviating Factors:  Radiation:  Duration/Timing:  Impact on ADLs:    DYSPNEA: ( ) Yes  ( ) No  Level:    PAST MEDICAL & SURGICAL HISTORY:  Dementia      DM (diabetes mellitus)      HTN (hypertension)      Type 2 diabetes mellitus      Swelling of lower extremity      S/P TKR (total knee replacement)          SOCIAL HX:    Hx opiate tolerance ( )YES  ( )NO    Baseline ADLs  (Prior to Admission)  ( ) Independent   ( )Dependent    FAMILY HISTORY:  FH: heart disease (Father)        Review of Systems:    Anxiety-  Depression-  Physical Discomfort-  Dyspnea-  Constipation-  Diarrhea-  Nausea-  Vomiting-  Anorexia-  Weight Loss-   Cough-  Secretions-  Fatigue-  Weakness-  Delirium-    All other systems reviewed and negative  Unable to obtain/Limited due to:      PHYSICAL EXAM:    Vital Signs Last 24 Hrs  T(C): 36.3 (18 Sep 2023 08:46), Max: 39.5 (17 Sep 2023 21:03)  T(F): 97.4 (18 Sep 2023 08:46), Max: 103.1 (17 Sep 2023 21:03)  HR: 86 (18 Sep 2023 08:46) (81 - 113)  BP: 102/45 (18 Sep 2023 08:46) (102/45 - 156/69)  BP(mean): 65 (18 Sep 2023 03:30) (65 - 106)  RR: 18 (18 Sep 2023 08:46) (17 - 28)  SpO2: 92% (18 Sep 2023 08:46) (92% - 95%)    Parameters below as of 18 Sep 2023 08:46  Patient On (Oxygen Delivery Method): room air      Daily Height in cm: 162.56 (17 Sep 2023 21:03)    Daily Weight in k.9 (18 Sep 2023 04:54)    PPSV2:   %  FAST:    General:  Mental Status:  HEENT:  Lungs:  Cardiac:  GI:  :  Ext:  Neuro:      LABS:                        12.3   23.43 )-----------( 157      ( 18 Sep 2023 06:28 )             37.5         137  |  105  |  35<H>  ----------------------------<  365<H>  3.2<L>   |  21<L>  |  1.82<H>    Ca    8.3<L>      18 Sep 2023 06:28  Mg     1.8         TPro  7.3  /  Alb  3.3  /  TBili  0.8  /  DBili  x   /  AST  21  /  ALT  22  /  AlkPhos  107      PT/INR - ( 17 Sep 2023 21:13 )   PT: 10.6 sec;   INR: 0.94 ratio         PTT - ( 17 Sep 2023 21:13 )  PTT:21.7 sec  Albumin: Albumin: 3.3 g/dL ( @ 21:13)      Allergies    No Known Allergies    Intolerances      MEDICATIONS  (STANDING):  albuterol    90 MICROgram(s) HFA Inhaler 2 Puff(s) Inhalation every 4 hours  albuterol/ipratropium for Nebulization 3 milliLiter(s) Nebulizer every 6 hours  amLODIPine   Tablet 5 milliGRAM(s) Oral daily  azithromycin  IVPB 500 milliGRAM(s) IV Intermittent every 24 hours  cefTRIAXone Injectable. 1000 milliGRAM(s) IV Push every 24 hours  dextrose 5%. 1000 milliLiter(s) (100 mL/Hr) IV Continuous <Continuous>  dextrose 5%. 1000 milliLiter(s) (50 mL/Hr) IV Continuous <Continuous>  dextrose 50% Injectable 25 Gram(s) IV Push once  dextrose 50% Injectable 12.5 Gram(s) IV Push once  dextrose 50% Injectable 25 Gram(s) IV Push once  glucagon  Injectable 1 milliGRAM(s) IntraMuscular once  heparin   Injectable 5000 Unit(s) SubCutaneous every 12 hours  hydrochlorothiazide 25 milliGRAM(s) Oral daily  insulin lispro (ADMELOG) corrective regimen sliding scale   SubCutaneous three times a day before meals  insulin lispro (ADMELOG) corrective regimen sliding scale   SubCutaneous at bedtime  memantine 10 milliGRAM(s) Oral two times a day  sodium chloride 0.9%. 1000 milliLiter(s) (75 mL/Hr) IV Continuous <Continuous>    MEDICATIONS  (PRN):  acetaminophen     Tablet .. 650 milliGRAM(s) Oral every 6 hours PRN Temp greater or equal to 38C (100.4F), Mild Pain (1 - 3)  aluminum hydroxide/magnesium hydroxide/simethicone Suspension 30 milliLiter(s) Oral every 4 hours PRN Dyspepsia  benzonatate 100 milliGRAM(s) Oral every 8 hours PRN Cough  dextrose Oral Gel 15 Gram(s) Oral once PRN Blood Glucose LESS THAN 70 milliGRAM(s)/deciliter  guaiFENesin ER 1200 milliGRAM(s) Oral every 12 hours PRN Cough  melatonin 3 milliGRAM(s) Oral at bedtime PRN Insomnia  ondansetron Injectable 4 milliGRAM(s) IV Push every 8 hours PRN Nausea and/or Vomiting      RADIOLOGY/ADDITIONAL STUDIES:   HPI:     " 88 year old female with PMH of dementia (A+Ox2 at baseline), HTN, Type 2 DM (not on medications currently, diet controlled), lower extremity swelling presents with weakness. I spoke to the pt's daughter at the bedside. She states that the pt was doing well yesterday. She returned home from dinner and a few hours later she found that her mothers slid off the couch and was slumped onto the floor. She had decreased energy, chills, cough productive of mucus, swelling of her lower extremities which is chronic and abdominal pain.   "            Patient was seen and examined.  Denies nausea, vomiting, shortness of breath, chest pain, headaches, abdominal pain, constipation     pt is confused at baseline  she knows she slid off the couch and states 'im here because i fell"    She wants to go home     She defers all conversations to her daughter  Franci, she states she helps with medical decisions.           PAIN: ( )Yes   ( x)Nox    DYSPNEA: ( ) Yes  (x ) No  Level:    PAST MEDICAL & SURGICAL HISTORY:  Dementia      DM (diabetes mellitus)      HTN (hypertension)      Type 2 diabetes mellitus      Swelling of lower extremity      S/P TKR (total knee replacement)          SOCIAL HX:    Hx opiate tolerance ( )YES  ( )NO    Baseline ADLs  (Prior to Admission)  (x ) Independent   ( )Dependent    FAMILY HISTORY:  FH: heart disease (Father)      Review of Systems:    Anxiety- anxious that shes in the hospital  Depression-denies  Physical Discomfort- in NAD  Dyspnea-denies  Constipation-denies  Diarrhea-denies  Nausea-denies  Vomiting-denies  Anorexia-denies  Weight Loss- denies  Cough-denies  Secretions-denies  Fatigue-denies  Weakness-denies  Delirium-denies    All other systems reviewed and negative       PHYSICAL EXAM:    Vital Signs Last 24 Hrs  T(C): 36.3 (18 Sep 2023 08:46), Max: 39.5 (17 Sep 2023 21:03)  T(F): 97.4 (18 Sep 2023 08:46), Max: 103.1 (17 Sep 2023 21:03)  HR: 86 (18 Sep 2023 08:46) (81 - 113)  BP: 102/45 (18 Sep 2023 08:46) (102/45 - 156/69)  BP(mean): 65 (18 Sep 2023 03:30) (65 - 106)  RR: 18 (18 Sep 2023 08:46) (17 - 28)  SpO2: 92% (18 Sep 2023 08:46) (92% - 95%)    Parameters below as of 18 Sep 2023 08:46  Patient On (Oxygen Delivery Method): room air      Daily Height in cm: 162.56 (17 Sep 2023 21:03)    Daily Weight in k.9 (18 Sep 2023 04:54)    PPSV2: 50  %  FAST:    General: calm in NAD  Mental Status: awake alert oriented x3  HEENT: eomi, perrl  Lungs: ctabl b/l bs  Cardiac: s1s2 no mgr  GI: soft nontender +BS  : voids  Ext: moves all 4 extremities spontaneously  Neuro: no gross findings       LABS:                        12.3   23.43 )-----------( 157      ( 18 Sep 2023 06:28 )             37.5         137  |  105  |  35<H>  ----------------------------<  365<H>  3.2<L>   |  21<L>  |  1.82<H>    Ca    8.3<L>      18 Sep 2023 06:28  Mg     1.8         TPro  7.3  /  Alb  3.3  /  TBili  0.8  /  DBili  x   /  AST  21  /  ALT  22  /  AlkPhos  107  09-17    PT/INR - ( 17 Sep 2023 21:13 )   PT: 10.6 sec;   INR: 0.94 ratio         PTT - ( 17 Sep 2023 21:13 )  PTT:21.7 sec  Albumin: Albumin: 3.3 g/dL ( @ 21:13)      Allergies    No Known Allergies    Intolerances      MEDICATIONS  (STANDING):  albuterol    90 MICROgram(s) HFA Inhaler 2 Puff(s) Inhalation every 4 hours  albuterol/ipratropium for Nebulization 3 milliLiter(s) Nebulizer every 6 hours  amLODIPine   Tablet 5 milliGRAM(s) Oral daily  azithromycin  IVPB 500 milliGRAM(s) IV Intermittent every 24 hours  cefTRIAXone Injectable. 1000 milliGRAM(s) IV Push every 24 hours  dextrose 5%. 1000 milliLiter(s) (100 mL/Hr) IV Continuous <Continuous>  dextrose 5%. 1000 milliLiter(s) (50 mL/Hr) IV Continuous <Continuous>  dextrose 50% Injectable 25 Gram(s) IV Push once  dextrose 50% Injectable 12.5 Gram(s) IV Push once  dextrose 50% Injectable 25 Gram(s) IV Push once  glucagon  Injectable 1 milliGRAM(s) IntraMuscular once  heparin   Injectable 5000 Unit(s) SubCutaneous every 12 hours  hydrochlorothiazide 25 milliGRAM(s) Oral daily  insulin lispro (ADMELOG) corrective regimen sliding scale   SubCutaneous three times a day before meals  insulin lispro (ADMELOG) corrective regimen sliding scale   SubCutaneous at bedtime  memantine 10 milliGRAM(s) Oral two times a day  sodium chloride 0.9%. 1000 milliLiter(s) (75 mL/Hr) IV Continuous <Continuous>    MEDICATIONS  (PRN):  acetaminophen     Tablet .. 650 milliGRAM(s) Oral every 6 hours PRN Temp greater or equal to 38C (100.4F), Mild Pain (1 - 3)  aluminum hydroxide/magnesium hydroxide/simethicone Suspension 30 milliLiter(s) Oral every 4 hours PRN Dyspepsia  benzonatate 100 milliGRAM(s) Oral every 8 hours PRN Cough  dextrose Oral Gel 15 Gram(s) Oral once PRN Blood Glucose LESS THAN 70 milliGRAM(s)/deciliter  guaiFENesin ER 1200 milliGRAM(s) Oral every 12 hours PRN Cough  melatonin 3 milliGRAM(s) Oral at bedtime PRN Insomnia  ondansetron Injectable 4 milliGRAM(s) IV Push every 8 hours PRN Nausea and/or Vomiting      RADIOLOGY/ADDITIONAL STUDIES:

## 2023-09-18 NOTE — PHYSICAL THERAPY INITIAL EVALUATION ADULT - PERTINENT HX OF CURRENT PROBLEM, REHAB EVAL
88 year old female with PMH of dementia (A+Ox2 at baseline), HTN, Type 2 DM (not on medications currently, diet controlled), lower extremity swelling presents with weakness. Pt with  Acute hypoxic respiratory distress and sepsis 2/2 pneumonia, LORY.    CTAP: Colonic diverticulosis without evidence of acute diverticulitis. Cholelithiasis without CT evidence of acute cholecystitis.  PRECONTRAST HEAD CT: No acute findings.  CTA HEAD: No flow-limiting stenosis, occlusion or aneurysm.  CTA NECK: 50% narrowing right ICA origin. Otherwise mild atherosclerosis in the neck.

## 2023-09-18 NOTE — H&P ADULT - NSHPSOCIALHISTORY_GEN_ALL_CORE
Lives with her daughter. Quit smoking a long time ago. No alcohol or drug use. Has HHA from 8:30 am to 6 pm every day. Needs assistance with ADLs and IADLs.

## 2023-09-19 LAB
ALBUMIN SERPL ELPH-MCNC: 2.2 G/DL — LOW (ref 3.3–5)
ALP SERPL-CCNC: 66 U/L — SIGNIFICANT CHANGE UP (ref 40–120)
ALT FLD-CCNC: 24 U/L — SIGNIFICANT CHANGE UP (ref 12–78)
ANION GAP SERPL CALC-SCNC: 5 MMOL/L — SIGNIFICANT CHANGE UP (ref 5–17)
AST SERPL-CCNC: 18 U/L — SIGNIFICANT CHANGE UP (ref 15–37)
BILIRUB SERPL-MCNC: 0.7 MG/DL — SIGNIFICANT CHANGE UP (ref 0.2–1.2)
BUN SERPL-MCNC: 35 MG/DL — HIGH (ref 7–23)
CALCIUM SERPL-MCNC: 7.9 MG/DL — LOW (ref 8.5–10.1)
CHLORIDE SERPL-SCNC: 111 MMOL/L — HIGH (ref 96–108)
CO2 SERPL-SCNC: 24 MMOL/L — SIGNIFICANT CHANGE UP (ref 22–31)
CREAT SERPL-MCNC: 1.49 MG/DL — HIGH (ref 0.5–1.3)
EGFR: 34 ML/MIN/1.73M2 — LOW
GLUCOSE BLDC GLUCOMTR-MCNC: 126 MG/DL — HIGH (ref 70–99)
GLUCOSE BLDC GLUCOMTR-MCNC: 129 MG/DL — HIGH (ref 70–99)
GLUCOSE BLDC GLUCOMTR-MCNC: 131 MG/DL — HIGH (ref 70–99)
GLUCOSE BLDC GLUCOMTR-MCNC: 193 MG/DL — HIGH (ref 70–99)
GLUCOSE SERPL-MCNC: 150 MG/DL — HIGH (ref 70–99)
HCT VFR BLD CALC: 31.2 % — LOW (ref 34.5–45)
HGB BLD-MCNC: 10.4 G/DL — LOW (ref 11.5–15.5)
MCHC RBC-ENTMCNC: 29.1 PG — SIGNIFICANT CHANGE UP (ref 27–34)
MCHC RBC-ENTMCNC: 33.3 GM/DL — SIGNIFICANT CHANGE UP (ref 32–36)
MCV RBC AUTO: 87.2 FL — SIGNIFICANT CHANGE UP (ref 80–100)
PLATELET # BLD AUTO: 137 K/UL — LOW (ref 150–400)
POTASSIUM SERPL-MCNC: 3.3 MMOL/L — LOW (ref 3.5–5.3)
POTASSIUM SERPL-SCNC: 3.3 MMOL/L — LOW (ref 3.5–5.3)
PROT SERPL-MCNC: 5.4 GM/DL — LOW (ref 6–8.3)
RBC # BLD: 3.58 M/UL — LOW (ref 3.8–5.2)
RBC # FLD: 15.4 % — HIGH (ref 10.3–14.5)
SODIUM SERPL-SCNC: 140 MMOL/L — SIGNIFICANT CHANGE UP (ref 135–145)
WBC # BLD: 19.56 K/UL — HIGH (ref 3.8–10.5)
WBC # FLD AUTO: 19.56 K/UL — HIGH (ref 3.8–10.5)

## 2023-09-19 PROCEDURE — 99233 SBSQ HOSP IP/OBS HIGH 50: CPT

## 2023-09-19 PROCEDURE — 99223 1ST HOSP IP/OBS HIGH 75: CPT

## 2023-09-19 PROCEDURE — 99232 SBSQ HOSP IP/OBS MODERATE 35: CPT

## 2023-09-19 RX ORDER — POTASSIUM CHLORIDE 20 MEQ
40 PACKET (EA) ORAL ONCE
Refills: 0 | Status: COMPLETED | OUTPATIENT
Start: 2023-09-19 | End: 2023-09-19

## 2023-09-19 RX ADMIN — AMLODIPINE BESYLATE 5 MILLIGRAM(S): 2.5 TABLET ORAL at 10:29

## 2023-09-19 RX ADMIN — AZITHROMYCIN 255 MILLIGRAM(S): 500 TABLET, FILM COATED ORAL at 03:47

## 2023-09-19 RX ADMIN — HEPARIN SODIUM 5000 UNIT(S): 5000 INJECTION INTRAVENOUS; SUBCUTANEOUS at 22:02

## 2023-09-19 RX ADMIN — PIPERACILLIN AND TAZOBACTAM 25 GRAM(S): 4; .5 INJECTION, POWDER, LYOPHILIZED, FOR SOLUTION INTRAVENOUS at 14:28

## 2023-09-19 RX ADMIN — Medication 3 MILLILITER(S): at 20:14

## 2023-09-19 RX ADMIN — MEMANTINE HYDROCHLORIDE 10 MILLIGRAM(S): 10 TABLET ORAL at 22:01

## 2023-09-19 RX ADMIN — HEPARIN SODIUM 5000 UNIT(S): 5000 INJECTION INTRAVENOUS; SUBCUTANEOUS at 10:29

## 2023-09-19 RX ADMIN — Medication 3 MILLILITER(S): at 02:05

## 2023-09-19 RX ADMIN — MEMANTINE HYDROCHLORIDE 10 MILLIGRAM(S): 10 TABLET ORAL at 10:29

## 2023-09-19 RX ADMIN — Medication 3 MILLILITER(S): at 14:03

## 2023-09-19 RX ADMIN — INSULIN GLARGINE 5 UNIT(S): 100 INJECTION, SOLUTION SUBCUTANEOUS at 22:00

## 2023-09-19 RX ADMIN — Medication 3 MILLILITER(S): at 08:04

## 2023-09-19 RX ADMIN — Medication 3 MILLIGRAM(S): at 22:01

## 2023-09-19 RX ADMIN — Medication 40 MILLIEQUIVALENT(S): at 14:29

## 2023-09-19 RX ADMIN — PIPERACILLIN AND TAZOBACTAM 25 GRAM(S): 4; .5 INJECTION, POWDER, LYOPHILIZED, FOR SOLUTION INTRAVENOUS at 22:01

## 2023-09-19 RX ADMIN — PIPERACILLIN AND TAZOBACTAM 25 GRAM(S): 4; .5 INJECTION, POWDER, LYOPHILIZED, FOR SOLUTION INTRAVENOUS at 06:10

## 2023-09-19 NOTE — CONSULT NOTE ADULT - ASSESSMENT
The patient is an 87 yo woman who was admitted to the hospital with cough, fatigue and LE swelling. Workup showed an abnormal CT scan of the chest. On my review there is LAKISHA pachy ground glass opacities. There is also some mosaicism as well through out the lungs    1. Ground glass opacities in Left upper lobe.   The differential for focal ground glass opacities is vast and includes etiologies including infection (viral, bacterial, atypical mycobacterium), inflammation (including due to rheumatological disease) , volume overload and mailgnancy. Given the current context bacterial infection and associated inflammation seems most plausible.     Would recommend:  - Swallowing eval at bedside - ordered  - Cont Abx per ID, volume status/ diuresis as needed.  - Repeat CT scan in 6 weeks.       2. LRTI  Noted Zosyn, azithromycin per iD  Agree      3. Cough, PRN benzotate    4. Dysphagia: monitor clinically swallow eval.     Thank you for the consult. Will follow

## 2023-09-19 NOTE — DIETITIAN INITIAL EVALUATION ADULT - OTHER INFO
88 year old female with PMH of dementia (A+Ox2 at baseline), HTN, Type 2 DM (not on medications currently, diet controlled), lower extremity swelling presents with weakness. I spoke to the pt's daughter at the bedside. She states that the pt was doing well yesterday. She returned home from dinner and a few hours later she found that her mothers slid off the couch and was slumped onto the floor. She had decreased energy, chills, cough productive of mucus, swelling of her lower extremities which is chronic and abdominal pain.   88 year old female with PMH of dementia (A+Ox2 at baseline), HTN, Type 2 DM (not on medications currently, diet controlled), lower extremity swelling presents with weakness. I spoke to the pt's daughter at the bedside. She states that the pt was doing well yesterday. She returned home from dinner and a few hours later she found that her mothers slid off the couch and was slumped onto the floor. She had decreased energy, chills, cough productive of mucus, swelling of her lower extremities which is chronic and abdominal pain.      Admit dx  PNA due to infectious organism  Pt visited while in chair, also accompanied by friend  EMR wt   70 kg   155#  NFPE reveals moderate muscle wasting, fat wasting   PO intake estimated < 75% ENN > one month  fair po intake at , some food was brought in from outside   Pt HbA1c is 7.9  Pt not aware that she has t2DM, she is confused  On POCT  Not on home meds  Suggest follow up with primary or endocrinologist  Suggest confirm Goals of Care regarding nutrition support   Recommendations to follow in Plan/Intervention

## 2023-09-19 NOTE — PROGRESS NOTE ADULT - SUBJECTIVE AND OBJECTIVE BOX
HPI:    CODE STATUS: FULL CODE    9/19  pt w/ ecoli found in cultures  currently comfortable and in NAD  no complaints, pleasently confused on exam          Pain and Dyspnea:   Pt unable to characterise d/t clinical status       Review of Systems:    Anxiety- denies  Depression-denies  Physical Discomfort- in NAD  Dyspnea-denies  Constipation-denies  Diarrhea-denies  Nausea-denies  Vomiting-denies  Anorexia-denies  Weight Loss- denies  Cough-denies  Secretions-denies  Fatigue-denies  Weakness-denies  Delirium- some confusion    All other systems reviewed and negative               PHYSICAL EXAM:    Vital Signs Last 24 Hrs  T(C): 36.8 (19 Sep 2023 20:00), Max: 36.8 (19 Sep 2023 20:00)  T(F): 98.3 (19 Sep 2023 20:00), Max: 98.3 (19 Sep 2023 20:00)  HR: 78 (20 Sep 2023 07:49) (74 - 82)  BP: 133/54 (20 Sep 2023 07:49) (119/59 - 133/54)  BP(mean): --  RR: 18 (20 Sep 2023 07:49) (17 - 18)  SpO2: 96% (20 Sep 2023 07:49) (95% - 96%)    Parameters below as of 20 Sep 2023 07:49  Patient On (Oxygen Delivery Method): nasal cannula  O2 Flow (L/min): 2    Daily     Daily     PPSV2: 50  %  FAST: 5    General: calm in NAD  Mental Status: awake alert oriented x2  HEENT: eomi, perrl  Lungs: ctabl b/l bs  no wheezing or rales  Cardiac: s1s2 no mgr  GI: soft nontender +BS  : voids  Ext: moves all 4 extremities spontaneously  Neuro: no gross findings       LABS and RADIOLOGY: REVIEWED

## 2023-09-19 NOTE — DISCHARGE NOTE NURSING/CASE MANAGEMENT/SOCIAL WORK - PATIENT PORTAL LINK FT
You can access the FollowMyHealth Patient Portal offered by [facility] by registering at the following website: [email or web address]. By joining [facility]’s FollowMyHealth portal, you will also be able to view your health information using other applications (apps) compatible with our system.

## 2023-09-19 NOTE — PROGRESS NOTE ADULT - ASSESSMENT
Process of Care  --Reviewed dx/treatment problems and alignment with Goals of Care    Cough   guaiFENesin ER 1200 milliGRAM(s) Oral every 12 hours PRN Cough  benzonatate 100 milliGRAM(s) Oral every 8 hours PRN Cough         Physical Aspects of Care  --Pain  patient denies at this time  c/w current managment    --Bowel Regimen  denies constipation  risk for constipation d/t immobility  daily dulcolax    --Dyspnea  No SOB at this time  comfortable and in NAD    --Nausea Vomiting  denies  zofran PRN     --Weakness  PT as tolerated     Psychological and Psychiatric Aspects of Care:   --Greif/Bereavment: emotional support provided  --Hx of psychiatric dx: none  -Pastoral Care Available PRN     Delirium PPX  Recommendations:   -Initiate melatonin 5mg qhs, to promote maintenance of circadian rhythm/restful sleep, and for ppx of future susceptibility to delirium upon resolution of presenting condition.  -Avoid deliriogenic agents including BZD, anticholinergics, and sedating agents if possible  -Re-orient frequently, encourage family at bedside as able.   -Early mobilization recommended if feasible.     Social Aspects of Care  -SW involved     Cultural Aspects  -Primary Language: English    Goals of Care:     We discussed Palliative Care team being a supportive team when a patient has ongoing illnesses.  We also discussed that it is not an end of life care service, but can help navigate symptoms and emotional support througout their hospital stay here.    Franci and i discussed advanced directives and goals of care  c/w Current measures- FULL CODE TEMPORARY INTUBATION   will follow up       Ethical and Legal Aspects:   NA        Capacity: pt w/ simple capacity  HCP/Surrogate: FRANCI daughter  Code Status: FULL CODE  MOLST: na  Dispo Plan: pending improvement hopes to go home    Discussed With: Case coordinated with attending and SW and RN     Time Spent: 70 minutes including the care, coordination and counseling of this patient, 50% of which was spent coordinating and counseling.

## 2023-09-19 NOTE — CONSULT NOTE ADULT - CONSULT REASON
sepsis  pyuria  ? aspiration pneumonitis
complex goals of care and symptom management
Aspiration pna/ ground glass opacities in upper lobe

## 2023-09-19 NOTE — PROGRESS NOTE ADULT - SUBJECTIVE AND OBJECTIVE BOX
Date of service: 09-19-23 @ 11:34    pt seen and examined  has ecoli growing in blood cx   weak appearing  no resp distress  no fever     ROS: no fever or chills; denies dizziness, no HA, no SOB + cough, no abdominal pain, no diarrhea or constipation; + dysuria, no urinary frequency, no legs pain, no rashes    MEDICATIONS  (STANDING):  albuterol    90 MICROgram(s) HFA Inhaler 2 Puff(s) Inhalation every 4 hours  albuterol/ipratropium for Nebulization 3 milliLiter(s) Nebulizer every 6 hours  amLODIPine   Tablet 5 milliGRAM(s) Oral daily  azithromycin  IVPB 500 milliGRAM(s) IV Intermittent every 24 hours  dextrose 5%. 1000 milliLiter(s) (100 mL/Hr) IV Continuous <Continuous>  dextrose 5%. 1000 milliLiter(s) (50 mL/Hr) IV Continuous <Continuous>  dextrose 50% Injectable 25 Gram(s) IV Push once  dextrose 50% Injectable 12.5 Gram(s) IV Push once  dextrose 50% Injectable 25 Gram(s) IV Push once  glucagon  Injectable 1 milliGRAM(s) IntraMuscular once  heparin   Injectable 5000 Unit(s) SubCutaneous every 12 hours  insulin glargine Injectable (LANTUS) 5 Unit(s) SubCutaneous at bedtime  insulin lispro (ADMELOG) corrective regimen sliding scale   SubCutaneous three times a day before meals  insulin lispro (ADMELOG) corrective regimen sliding scale   SubCutaneous at bedtime  memantine 10 milliGRAM(s) Oral two times a day  piperacillin/tazobactam IVPB.. 3.375 Gram(s) IV Intermittent every 8 hours  sodium chloride 0.9%. 1000 milliLiter(s) (75 mL/Hr) IV Continuous <Continuous>    Vital Signs Last 24 Hrs  T(C): 36.6 (19 Sep 2023 07:37), Max: 36.9 (19 Sep 2023 03:00)  T(F): 97.8 (19 Sep 2023 07:37), Max: 98.4 (19 Sep 2023 03:00)  HR: 82 (19 Sep 2023 08:05) (76 - 88)  BP: 134/57 (19 Sep 2023 07:37) (115/50 - 134/57)  BP(mean): --  RR: 18 (19 Sep 2023 07:37) (17 - 19)  SpO2: 97% (19 Sep 2023 07:37) (94% - 99%)    Parameters below as of 19 Sep 2023 08:05  Patient On (Oxygen Delivery Method): nasal cannula, 2lpm      PE:  Constitutional: NAD   HEENT: NC/AT, EOMI, PERRLA, conjunctivae clear; ears and nose atraumatic; pharynx benign  Neck: supple; thyroid not palpable  Back: no tenderness  Respiratory: decreased breath sounds   Cardiovascular: S1S2 regular, no murmurs  Abdomen: soft, not tender, not distended, positive BS; liver and spleen WNL  Genitourinary: no suprapubic tenderness  Lymphatic: no LN palpable  Musculoskeletal: no muscle tenderness, no joint swelling or tenderness  Extremities: no pedal edema  Neurological/ Psychiatric:  moving all extremities  Skin: no rashes; no palpable lesions    Labs: all available labs reviewed                        10.4 19.56 )-----------( 137      ( 19 Sep 2023 06:44 )             31.2     09-19    140  |  111<H>  |  35<H>  ----------------------------<  150<H>  3.3<L>   |  24  |  1.49<H>    Ca    7.9<L>      19 Sep 2023 06:44  Mg     1.8     09-18    TPro  5.4<L>  /  Alb  2.2<L>  /  TBili  0.7  /  DBili  x   /  AST  18  /  ALT  24  /  AlkPhos  66  09-19        Urinalysis Basic - ( 18 Sep 2023 06:28 )    Color: x / Appearance: x / SG: x / pH: x  Gluc: 365 mg/dL / Ketone: x  / Bili: x / Urobili: x   Blood: x / Protein: x / Nitrite: x   Leuk Esterase: x / RBC: x / WBC x   Sq Epi: x / Non Sq Epi: x / Bacteria: x    Culture - Blood (09.17.23 @ 22:06)   - Escherichia coli: Detec  Gram Stain:   Growth in aerobic bottle: Gram Negative Rods   Growth in anaerobic bottle: Gram Negative Rods  Specimen Source: .Blood None  Organism: Blood Culture PCR  Culture Results:   Growth in aerobic bottle: Gram Negative Rods   Culture - Blood (09.17.23 @ 21:13)   Gram Stain:   Growth in aerobic bottle: Gram Negative Rods   Growth in anaerobic bottle: Gram Negative Rods  Specimen Source: .Blood Blood-Peripheral  Culture Results:   Growth in aerobic bottle: Gram Negative Rods   Growth in anaerobic bottle: Gram Negative Rods      Radiology: all available radiological tests reviewed  < from: CT Abdomen and Pelvis w/ IV Cont (09.17.23 @ 22:42) >    ACC: [id number] EXAM:  CT ABDOMEN AND PELVIS IC   ORDERED BY: MELISSA HU     PROCEDURE DATE:  09/17/2023          INTERPRETATION:  CLINICAL INFORMATION: Abdominal pain, fever    COMPARISON: None.    CONTRAST/COMPLICATIONS:  IV Contrast: Omnipaque 10265 cc administered   10 cc discarded  Oral Contrast: NONE  Complications: None reported at time of study completion    PROCEDURE:  CT of the Abdomen and Pelvis was performed.  Sagittal and coronal reformats were performed.    FINDINGS:  LOWER CHEST: Coronary artery calcifications versus stenting. Aortic valve   leaflet calcifications. Bibasilar subsegmental atelectasis.    LIVER: Within normal limits.  BILE DUCTS: Normal caliber.  GALLBLADDER: Cholelithiasis.  SPLEEN: Within normal limits.  PANCREAS: Mildly atrophic.  ADRENALS: Within normal limits.  KIDNEYS/URETERS: Bilateral renal cysts and low-density lesions too small   to characterize. Bilateral parapelvic cysts.    BLADDER: Contrast within the urinary bladder.  REPRODUCTIVE ORGANS: Uterus and adnexa within normal limits. 2.9 cm   simple appearing left adnexal cyst, likely benign.    BOWEL: No bowel obstruction. Moderate colonic stool burden. Colonic   diverticulosis without diverticulitis. Appendix is normal.  PERITONEUM: No ascites.  VESSELS: Atherosclerotic changes.  RETROPERITONEUM/LYMPH NODES: No lymphadenopathy.  ABDOMINAL WALL: Within normal limits.  BONES: Degenerative changes.    IMPRESSION:  Colonic diverticulosis without evidence of acute diverticulitis.    Cholelithiasis without CT evidence of acute cholecystitis.        --- End of Report ---    < end of copied text >  < from: Xray Chest 1 View- PORTABLE-Urgent (09.17.23 @ 22:34) >  ACC: [id number] EXAM:  XR CHEST PORTABLE URGENT 1V   ORDERED BY: MELISSA ALI     ACC: [id number] EXAM:  XR PELVIS AP ONLY 1-2 VIEWS   ORDERED BY: MELISSA HU     PROCEDURE DATE:  09/17/2023          INTERPRETATION:  Pelvis and chest. Patient has sepsis.    Pelvis.    Degenerative loss of disc height at L4-5 noted.    Hips are rather free of degeneration. No fracture. Contrast in the   bladder from CAT scan noted.    AP chest on September 17, 2023 at 10:29 PM.    Heart magnified by technique.    There are scattered infiltrates mainly in the midlung fields of uncertain   etiology. These infiltrates are essentially new since May 13, 2020.    IMPRESSION: Scattered perihilar infiltrates at this time. No fracture.    --- End of Report ---        < end of copied text >    Advanced directives addressed: full resuscitation

## 2023-09-19 NOTE — DIETITIAN NUTRITION RISK NOTIFICATION - ADDITIONAL COMMENTS/DIETITIAN RECOMMENDATIONS
Change diet to consistent carb  Record PO intake in EMR after each meal (nursing.)   MVI w/ minerals daily to ensure 100% RDA met   Monitor bowel movements, if no BM for >3 days, consider implementing bowel regimen.   Consider adding thiamine 100 mg daily 2/2 poor PO intake/ malnutrition  Monitor PO intake, tolerance, labs and weight.

## 2023-09-19 NOTE — PROGRESS NOTE ADULT - ASSESSMENT
88 year old female with PMH of dementia (A+Ox2 at baseline), HTN, Type 2 DM (not on medications currently, diet controlled), lower extremity swelling presents with weakness. per pt's daughter she returned home from dinner and a few hours later she found that her mothers slid off the couch and was slumped onto the floor. She had decreased energy, chills, cough productive of mucus, swelling of her lower extremities which is chronic and abdominal pain.  ER course: Temperature 103.1, heart rate 108-113, SPO2 93% on room air -> 93 to 95% on 3 L nasal cannula. Labs: Neutrophils 88% metamyelocytes 1% myelocytes 1%, potassium 3.3, creatinine 1.57 (baseline 0.8), glucose 287, lactate 3.5.  UA small blood, RBC 3-5, many bacteria.  COVID and RVP negative. CT abd/pelvis: Colonic diverticulosis without evidence of acute diverticulitis. Cholelithiasis without CT evidence of acute cholecystitis.  Patient was given Zosyn, 2200 mL of normal saline, IV Tylenol, 40 mEq of PO potassium.  She was admitted to med/surg for further management.     1. Severe sepsis with Ecoli. Pyuria. UTI. LAKISHA aspiration pneumonia. Metabolic encephalopathy. LORY  - imaging reviewed  - blood cx growing ecoli f/u urine cx source is likely gu  - CT chest noted has LAKISHA pna probable aspiration related   - s/p rocephin, on  IV zosyn [id number] #2  - on azithromycin 500mg daily #2, complete 3 days   - continue with antibiotic coverage  - monitor temps  - tolerating abx well so far; no side effects noted  - reason for abx use and side effects reviewed with patient  - supportive care  - fu cbc    2. other issues - care per medicine

## 2023-09-19 NOTE — DIETITIAN INITIAL EVALUATION ADULT - PERTINENT MEDS FT
MEDICATIONS  (STANDING):  albuterol    90 MICROgram(s) HFA Inhaler 2 Puff(s) Inhalation every 4 hours  albuterol/ipratropium for Nebulization 3 milliLiter(s) Nebulizer every 6 hours  amLODIPine   Tablet 5 milliGRAM(s) Oral daily  azithromycin  IVPB 500 milliGRAM(s) IV Intermittent every 24 hours  dextrose 5%. 1000 milliLiter(s) (100 mL/Hr) IV Continuous <Continuous>  dextrose 5%. 1000 milliLiter(s) (50 mL/Hr) IV Continuous <Continuous>  dextrose 50% Injectable 25 Gram(s) IV Push once  dextrose 50% Injectable 12.5 Gram(s) IV Push once  dextrose 50% Injectable 25 Gram(s) IV Push once  glucagon  Injectable 1 milliGRAM(s) IntraMuscular once  heparin   Injectable 5000 Unit(s) SubCutaneous every 12 hours  insulin glargine Injectable (LANTUS) 5 Unit(s) SubCutaneous at bedtime  insulin lispro (ADMELOG) corrective regimen sliding scale   SubCutaneous three times a day before meals  insulin lispro (ADMELOG) corrective regimen sliding scale   SubCutaneous at bedtime  memantine 10 milliGRAM(s) Oral two times a day  piperacillin/tazobactam IVPB.. 3.375 Gram(s) IV Intermittent every 8 hours  potassium chloride    Tablet ER 40 milliEquivalent(s) Oral once  sodium chloride 0.9%. 1000 milliLiter(s) (75 mL/Hr) IV Continuous <Continuous>    MEDICATIONS  (PRN):  acetaminophen     Tablet .. 650 milliGRAM(s) Oral every 6 hours PRN Temp greater or equal to 38C (100.4F), Mild Pain (1 - 3)  aluminum hydroxide/magnesium hydroxide/simethicone Suspension 30 milliLiter(s) Oral every 4 hours PRN Dyspepsia  benzonatate 100 milliGRAM(s) Oral every 8 hours PRN Cough  dextrose Oral Gel 15 Gram(s) Oral once PRN Blood Glucose LESS THAN 70 milliGRAM(s)/deciliter  guaiFENesin ER 1200 milliGRAM(s) Oral every 12 hours PRN Cough  melatonin 3 milliGRAM(s) Oral at bedtime PRN Insomnia  ondansetron Injectable 4 milliGRAM(s) IV Push every 8 hours PRN Nausea and/or Vomiting

## 2023-09-19 NOTE — CONSULT NOTE ADULT - SUBJECTIVE AND OBJECTIVE BOX
HPI:  88 year old female with PMH of dementia (A+Ox2 at baseline), HTN, Type 2 DM (not on medications currently, diet controlled), lower extremity swelling presents with weaknessShe had decreased energy, chills, cough productive of mucus, swelling of her lower extremities which is chronic and abdominal pain.  The patient was admitted to the hospital and started on broad spectrum antibiotics. Pulmonary was consulted for abnormal CT findings.     The patient is a poor historian due to dementia. A friend was present at the bedside who provided supplemental history.     She denied aspiration when eating or drinking liquids.   No specific exposures reported.   No history of rheumatological disease.       REVIEW OF SYSTEMS:    [x ] Unable to assess ROS because of dementia   Gross attempted 12 systems and mostly she said negative      PHYSICAL EXAM:  General: Awake, alert, oriented to self   HEENT: Atraumatic, normocephalic.                  No tonsillar or pharyngeal exudates.  Lymph Nodes: No palpable lymphadenopathy  Neck: No JVD. No carotid bruit.   Respiratory: Normal chest expansion                         Normal percussion                         poor respiratory effors                         No wheeze, rhonchi or rales appreiciated  Cardiovascular: S1 S2 normal. No murmurs, rubs or gallops appreciated.   Abdomen: Soft, non-tender, non-distended. No organomegaly.  Extremities: Warm to touch.  No pedal edema.   Neurological: Non-focal  Psychiatry: Appropriate mood and affect.      PAST MEDICAL & SURGICAL HISTORY:  Dementia  DM (diabetes mellitus)  HTN (hypertension)  Type 2 diabetes mellitus  Swelling of lower extremity  S/P TKR (total knee replacement)    FAMILY HISTORY:  FH: heart disease (Father)    SOCIAL HISTORY:  Smoking: past history of 10 years    Occupation: retired  Exposures:  Recent Travel:    Allergies    No Known Allergies    Intolerances            OBJECTIVE:  ICU Vital Signs Last 24 Hrs  T(C): 36.6 (19 Sep 2023 07:37), Max: 36.9 (19 Sep 2023 03:00)  T(F): 97.8 (19 Sep 2023 07:37), Max: 98.4 (19 Sep 2023 03:00)  HR: 78 (19 Sep 2023 14:07) (76 - 88)  BP: 134/57 (19 Sep 2023 07:37) (115/50 - 134/57)  BP(mean): --  ABP: --  ABP(mean): --  RR: 18 (19 Sep 2023 07:37) (17 - 19)  SpO2: 97% (19 Sep 2023 07:37) (94% - 99%)    O2 Parameters below as of 19 Sep 2023 14:07  Patient On (Oxygen Delivery Method): nasal cannula, 2lpm              CAPILLARY BLOOD GLUCOSE      POCT Blood Glucose.: 126 mg/dL (19 Sep 2023 17:17)          HOSPITAL MEDICATIONS:  MEDICATIONS  (STANDING):  albuterol    90 MICROgram(s) HFA Inhaler 2 Puff(s) Inhalation every 4 hours  albuterol/ipratropium for Nebulization 3 milliLiter(s) Nebulizer every 6 hours  amLODIPine   Tablet 5 milliGRAM(s) Oral daily  azithromycin  IVPB 500 milliGRAM(s) IV Intermittent every 24 hours  dextrose 5%. 1000 milliLiter(s) (100 mL/Hr) IV Continuous <Continuous>  dextrose 5%. 1000 milliLiter(s) (50 mL/Hr) IV Continuous <Continuous>  dextrose 50% Injectable 25 Gram(s) IV Push once  dextrose 50% Injectable 12.5 Gram(s) IV Push once  dextrose 50% Injectable 25 Gram(s) IV Push once  glucagon  Injectable 1 milliGRAM(s) IntraMuscular once  heparin   Injectable 5000 Unit(s) SubCutaneous every 12 hours  insulin glargine Injectable (LANTUS) 5 Unit(s) SubCutaneous at bedtime  insulin lispro (ADMELOG) corrective regimen sliding scale   SubCutaneous three times a day before meals  insulin lispro (ADMELOG) corrective regimen sliding scale   SubCutaneous at bedtime  memantine 10 milliGRAM(s) Oral two times a day  piperacillin/tazobactam IVPB.. 3.375 Gram(s) IV Intermittent every 8 hours  sodium chloride 0.9%. 1000 milliLiter(s) (75 mL/Hr) IV Continuous <Continuous>    MEDICATIONS  (PRN):  acetaminophen     Tablet .. 650 milliGRAM(s) Oral every 6 hours PRN Temp greater or equal to 38C (100.4F), Mild Pain (1 - 3)  aluminum hydroxide/magnesium hydroxide/simethicone Suspension 30 milliLiter(s) Oral every 4 hours PRN Dyspepsia  benzonatate 100 milliGRAM(s) Oral every 8 hours PRN Cough  dextrose Oral Gel 15 Gram(s) Oral once PRN Blood Glucose LESS THAN 70 milliGRAM(s)/deciliter  guaiFENesin ER 1200 milliGRAM(s) Oral every 12 hours PRN Cough  melatonin 3 milliGRAM(s) Oral at bedtime PRN Insomnia  ondansetron Injectable 4 milliGRAM(s) IV Push every 8 hours PRN Nausea and/or Vomiting      LABS:                        10.4   19.56 )-----------( 137      ( 19 Sep 2023 06:44 )             31.2     09-19    140  |  111<H>  |  35<H>  ----------------------------<  150<H>  3.3<L>   |  24  |  1.49<H>    Ca    7.9<L>      19 Sep 2023 06:44  Mg     1.8     09-18    TPro  5.4<L>  /  Alb  2.2<L>  /  TBili  0.7  /  DBili  x   /  AST  18  /  ALT  24  /  AlkPhos  66  09-19    PT/INR - ( 17 Sep 2023 21:13 )   PT: 10.6 sec;   INR: 0.94 ratio         PTT - ( 17 Sep 2023 21:13 )  PTT:21.7 sec        MICROBIOLOGY:     RADIOLOGY:   Reviewed and interpreted myself    < from: CT Chest No Cont (09.18.23 @ 15:18) >    ACC: [id number] EXAM:  CT CHEST   ORDERED BY: HALEY FATIMA     PROCEDURE DATE:  09/18/2023          INTERPRETATION:  Clinical information: Evaluate for pneumonia.    CT scan of the chest was obtained without administration of intravenous   contrast.    Calcified lymph node is present in the pretracheal space. Few small lymph   nodes are also noted in the AP window.    Heart is enlarged in size. Calcification the coronary arteries is noted.   No pericardial effusion is noted.    No endobronchial lesions are noted. Minimal atelectasis is noted in the   posterior dependent aspect of the right lower lobe. Patchy groundglass   opacities are present in the peripheral aspect of the left upper lobe. No   pleural effusions are noted.    Below thediaphragm, visualized portions of the abdomen demonstrate   residual contrast in the left renal collecting system.    Degenerative changes of the spine are noted.    IMPRESSION: Patchy groundglass opacities in the left upper lobe are of   uncertain etiology.    --- End of Report ---            HELLEN MCLAUGHLIN MD; Attending Radiologist  This document has been electronically signed. Sep 18 2023  3:35PM    < end of copied text >

## 2023-09-19 NOTE — DIETITIAN INITIAL EVALUATION ADULT - NAME AND PHONE
Divya Greer RDN, CDN, [facility]      [phone]   [email or web address]

## 2023-09-19 NOTE — PROGRESS NOTE ADULT - SUBJECTIVE AND OBJECTIVE BOX
Reason for Admission: Weakness    Patient is a 88 year old female with PMH of dementia (A+Ox2 at baseline), HTN, Type 2 DM (not on medications currently, diet controlled), lower extremity swelling presents with weakness. I spoke to the pt's daughter at the bedside. She states that the pt was doing well yesterday. She returned home from dinner and a few hours later she found that her mothers slid off the couch and was slumped onto the floor. She had decreased energy, chills, cough productive of mucus, swelling of her lower extremities which is chronic and abdominal pain.      ER course: Temperature 103.1, heart rate 108-113, SPO2 93% on room air -> 93 to 95% on 3 L nasal cannula. Labs: Neutrophils 88% metamyelocytes 1% myelocytes 1%, potassium 3.3, creatinine 1.57 (baseline 0.8), glucose 287, lactate 3.5.  UA small blood, RBC 3-5, many bacteria.  COVID and RVP negative.    Patient is sitting up. comfortable. No signs of infection - this being said - > patient has a high lactate / high WBC. Care discussed with Patient's daughter.       REVIEW OF SYSTEMS:  T(C): 36.6 (19 Sep 2023 07:37), Max: 36.9 (19 Sep 2023 03:00)  T(F): 97.8 (19 Sep 2023 07:37), Max: 98.4 (19 Sep 2023 03:00)  HR: 82 (19 Sep 2023 08:05) (76 - 88)  BP: 134/57 (19 Sep 2023 07:37) (115/50 - 134/57)  RR: 18 (19 Sep 2023 07:37) (17 - 19)  SpO2: 97% (19 Sep 2023 07:37) (94% - 99%)  General: NAD, hemodynamically stable   HEENT:  Eyes:  No visual loss   CARDIOVASCULAR:  No chest pain   RESPIRATORY:  No shortness of breath   GASTROINTESTINAL:  No anorexia   NEUROLOGICAL:  No headache   MUSCULOSKELETAL:  No muscle   HEMATOLOGIC:  No anemia   LYMPHATICS:  No enlarged nodes  ENDOCRINOLOGIC:  No reports of sweating, cold or heat intolerance. No polyuria or polydipsia.  ALLERGIES:  No history of asthma, hives, eczema or rhinitis.    Physical Exam:   GENERAL APPEARANCE:  very deconditioned, frail, sick  T(C): 36.6 (19 Sep 2023 07:37), Max: 36.9 (19 Sep 2023 03:00)  T(F): 97.8 (19 Sep 2023 07:37), Max: 98.4 (19 Sep 2023 03:00)  HR: 82 (19 Sep 2023 08:05) (76 - 88)  BP: 134/57 (19 Sep 2023 07:37) (115/50 - 134/57)  RR: 18 (19 Sep 2023 07:37) (17 - 19)  SpO2: 97% (19 Sep 2023 07:37) (94% - 99%)  HEENT:  Head is normocephalic    Skin:  Warm and dry without any rash   NECK:  Supple without lymphadenopathy.   HEART:  Regular rate and rhythm. normal S1 and S2, No M/R/G  LUNGS:  Good ins/exp effort, no W/R/R/C  ABDOMEN:  Soft, nontender, nondistended with good bowel sounds heard  EXTREMITIES:  Without cyanosis, clubbing or edema.   NEUROLOGICAL:  Gross nonfocal     Labs:     CBC Full  -  ( 19 Sep 2023 06:44 )  WBC Count : 19.56 K/uL  RBC Count : 3.58 M/uL  Hemoglobin : 10.4 g/dL  Hematocrit : 31.2 %  Platelet Count - Automated : 137 K/uL  Mean Cell Volume : 87.2 fl  Mean Cell Hemoglobin : 29.1 pg  Mean Cell Hemoglobin Concentration : 33.3 gm/dL  Auto Neutrophil # : x  Auto Lymphocyte # : x  Auto Monocyte # : x  Auto Eosinophil # : x  Auto Basophil # : x  Auto Neutrophil % : x  Auto Lymphocyte % : x  Auto Monocyte % : x  Auto Eosinophil % : x  Auto Basophil % : x    PT/INR - ( 17 Sep 2023 21:13 )   PT: 10.6 sec;   INR: 0.94 ratio         PTT - ( 17 Sep 2023 21:13 )  PTT:21.7 sec  Urinalysis Basic - ( 19 Sep 2023 06:44 )    Color: x / Appearance: x / SG: x / pH: x  Gluc: 150 mg/dL / Ketone: x  / Bili: x / Urobili: x   Blood: x / Protein: x / Nitrite: x   Leuk Esterase: x / RBC: x / WBC x   Sq Epi: x / Non Sq Epi: x / Bacteria: x      09-19    140  |  111<H>  |  35<H>  ----------------------------<  150<H>  3.3<L>   |  24  |  1.49<H>    Ca    7.9<L>      19 Sep 2023 06:44  Mg     1.8     09-18    TPro  5.4<L>  /  Alb  2.2<L>  /  TBili  0.7  /  DBili  x   /  AST  18  /  ALT  24  /  AlkPhos  66  09-19      Patient is a 88-year-old female presents with weakness:     # Acute hypoxic respiratory distress and sepsis secondary to aspiration pneumonia  # Severe sepsis  # Lactic acidosis -  resolving 6 -> 3.5  # Elevated WBC -  trending down  - Blood cultures + for E-COLI  - IV hydration  - IV Zosyn /  IV Zithromax  - CT chest: Patchy ground-glass opacities in the left upper lobe are of uncertain etiology.   - Care discussed with Dr. Roberson    # Acute kidney injury   - Cr 1.57 (baseline 0.8), monitor closely   - s/p IVF in the ER, c/w gentle hydration with IVF at 75 ml/hr overnight (monitor for fluid overload)   - Avoid nephrotoxic medications   - Strict I+Os     # Hypokalemia   - K+ 3.3, repleted in the ER   - f/u AM K+ and Mg levels     # Hyperglycemia   - Glucose 287, monitor   - Ordered HbA1c   - ISS for now -> add on basal bolus if persistently elevated accuchecks     # Microscopic hematuria   - UA small blood, RBC 3-5, many bacteria  - Recommend f/u UA in 4-6 weeks and if persistence of hematuria will need urology f/u     # History of dementia (A+Ox2 at baseline), HTN, Type 2 DM (not on medications currently, diet controlled), lower extremity swelling   - c/w home medications; verified with pt at the bedside  - Metamyelocytes 1% myelocytes 1%, trend     # DVT ppx:   - Heparin 5,000 units Q12H (hold for PLT <50,000)     # Code status: Full code. Palliative care consult   Emergency contact: Franci Wesley (daughter) [phone]

## 2023-09-19 NOTE — DIETITIAN INITIAL EVALUATION ADULT - PERTINENT LABORATORY DATA
09-19    140  |  111<H>  |  35<H>  ----------------------------<  150<H>  3.3<L>   |  24  |  1.49<H>    Ca    7.9<L>      19 Sep 2023 06:44  Mg     1.8     09-18    TPro  5.4<L>  /  Alb  2.2<L>  /  TBili  0.7  /  DBili  x   /  AST  18  /  ALT  24  /  AlkPhos  66  09-19  POCT Blood Glucose.: 131 mg/dL (09-19-23 @ 08:00)  A1C with Estimated Average Glucose Result: 7.9 % (09-18-23 @ 06:28)

## 2023-09-20 LAB
-  AMIKACIN: SIGNIFICANT CHANGE UP
-  AMPICILLIN/SULBACTAM: SIGNIFICANT CHANGE UP
-  AMPICILLIN: SIGNIFICANT CHANGE UP
-  AZTREONAM: SIGNIFICANT CHANGE UP
-  CEFAZOLIN: SIGNIFICANT CHANGE UP
-  CEFEPIME: SIGNIFICANT CHANGE UP
-  CEFOXITIN: SIGNIFICANT CHANGE UP
-  CEFTRIAXONE: SIGNIFICANT CHANGE UP
-  CIPROFLOXACIN: SIGNIFICANT CHANGE UP
-  ERTAPENEM: SIGNIFICANT CHANGE UP
-  GENTAMICIN: SIGNIFICANT CHANGE UP
-  IMIPENEM: SIGNIFICANT CHANGE UP
-  LEVOFLOXACIN: SIGNIFICANT CHANGE UP
-  MEROPENEM: SIGNIFICANT CHANGE UP
-  PIPERACILLIN/TAZOBACTAM: SIGNIFICANT CHANGE UP
-  TOBRAMYCIN: SIGNIFICANT CHANGE UP
-  TRIMETHOPRIM/SULFAMETHOXAZOLE: SIGNIFICANT CHANGE UP
ANION GAP SERPL CALC-SCNC: 6 MMOL/L — SIGNIFICANT CHANGE UP (ref 5–17)
BUN SERPL-MCNC: 31 MG/DL — HIGH (ref 7–23)
CALCIUM SERPL-MCNC: 8.1 MG/DL — LOW (ref 8.5–10.1)
CHLORIDE SERPL-SCNC: 109 MMOL/L — HIGH (ref 96–108)
CO2 SERPL-SCNC: 24 MMOL/L — SIGNIFICANT CHANGE UP (ref 22–31)
CREAT SERPL-MCNC: 1.42 MG/DL — HIGH (ref 0.5–1.3)
CULTURE RESULTS: SIGNIFICANT CHANGE UP
CULTURE RESULTS: SIGNIFICANT CHANGE UP
EGFR: 36 ML/MIN/1.73M2 — LOW
GLUCOSE BLDC GLUCOMTR-MCNC: 112 MG/DL — HIGH (ref 70–99)
GLUCOSE BLDC GLUCOMTR-MCNC: 115 MG/DL — HIGH (ref 70–99)
GLUCOSE BLDC GLUCOMTR-MCNC: 135 MG/DL — HIGH (ref 70–99)
GLUCOSE BLDC GLUCOMTR-MCNC: 165 MG/DL — HIGH (ref 70–99)
GLUCOSE SERPL-MCNC: 180 MG/DL — HIGH (ref 70–99)
HCT VFR BLD CALC: 32.4 % — LOW (ref 34.5–45)
HGB BLD-MCNC: 10.9 G/DL — LOW (ref 11.5–15.5)
MCHC RBC-ENTMCNC: 29.2 PG — SIGNIFICANT CHANGE UP (ref 27–34)
MCHC RBC-ENTMCNC: 33.6 GM/DL — SIGNIFICANT CHANGE UP (ref 32–36)
MCV RBC AUTO: 86.9 FL — SIGNIFICANT CHANGE UP (ref 80–100)
METHOD TYPE: SIGNIFICANT CHANGE UP
ORGANISM # SPEC MICROSCOPIC CNT: SIGNIFICANT CHANGE UP
PLATELET # BLD AUTO: 126 K/UL — LOW (ref 150–400)
POTASSIUM SERPL-MCNC: 3.4 MMOL/L — LOW (ref 3.5–5.3)
POTASSIUM SERPL-SCNC: 3.4 MMOL/L — LOW (ref 3.5–5.3)
RBC # BLD: 3.73 M/UL — LOW (ref 3.8–5.2)
RBC # FLD: 15.4 % — HIGH (ref 10.3–14.5)
SODIUM SERPL-SCNC: 139 MMOL/L — SIGNIFICANT CHANGE UP (ref 135–145)
SPECIMEN SOURCE: SIGNIFICANT CHANGE UP
SPECIMEN SOURCE: SIGNIFICANT CHANGE UP
WBC # BLD: 12.14 K/UL — HIGH (ref 3.8–10.5)
WBC # FLD AUTO: 12.14 K/UL — HIGH (ref 3.8–10.5)

## 2023-09-20 PROCEDURE — 99233 SBSQ HOSP IP/OBS HIGH 50: CPT

## 2023-09-20 PROCEDURE — 99497 ADVNCD CARE PLAN 30 MIN: CPT

## 2023-09-20 PROCEDURE — 99232 SBSQ HOSP IP/OBS MODERATE 35: CPT

## 2023-09-20 RX ORDER — POTASSIUM CHLORIDE 20 MEQ
40 PACKET (EA) ORAL ONCE
Refills: 0 | Status: COMPLETED | OUTPATIENT
Start: 2023-09-20 | End: 2023-09-20

## 2023-09-20 RX ORDER — CEFTRIAXONE 500 MG/1
2000 INJECTION, POWDER, FOR SOLUTION INTRAMUSCULAR; INTRAVENOUS EVERY 24 HOURS
Refills: 0 | Status: DISCONTINUED | OUTPATIENT
Start: 2023-09-20 | End: 2023-09-22

## 2023-09-20 RX ADMIN — AMLODIPINE BESYLATE 5 MILLIGRAM(S): 2.5 TABLET ORAL at 09:44

## 2023-09-20 RX ADMIN — Medication 3 MILLIGRAM(S): at 21:59

## 2023-09-20 RX ADMIN — Medication 3 MILLILITER(S): at 07:39

## 2023-09-20 RX ADMIN — HEPARIN SODIUM 5000 UNIT(S): 5000 INJECTION INTRAVENOUS; SUBCUTANEOUS at 21:59

## 2023-09-20 RX ADMIN — Medication 3 MILLILITER(S): at 01:37

## 2023-09-20 RX ADMIN — CEFTRIAXONE 2000 MILLIGRAM(S): 500 INJECTION, POWDER, FOR SOLUTION INTRAMUSCULAR; INTRAVENOUS at 16:38

## 2023-09-20 RX ADMIN — AZITHROMYCIN 255 MILLIGRAM(S): 500 TABLET, FILM COATED ORAL at 05:42

## 2023-09-20 RX ADMIN — PIPERACILLIN AND TAZOBACTAM 25 GRAM(S): 4; .5 INJECTION, POWDER, LYOPHILIZED, FOR SOLUTION INTRAVENOUS at 05:42

## 2023-09-20 RX ADMIN — MEMANTINE HYDROCHLORIDE 10 MILLIGRAM(S): 10 TABLET ORAL at 21:59

## 2023-09-20 RX ADMIN — MEMANTINE HYDROCHLORIDE 10 MILLIGRAM(S): 10 TABLET ORAL at 09:44

## 2023-09-20 RX ADMIN — Medication 40 MILLIEQUIVALENT(S): at 16:38

## 2023-09-20 RX ADMIN — Medication 40 MILLIEQUIVALENT(S): at 09:44

## 2023-09-20 RX ADMIN — PIPERACILLIN AND TAZOBACTAM 25 GRAM(S): 4; .5 INJECTION, POWDER, LYOPHILIZED, FOR SOLUTION INTRAVENOUS at 13:37

## 2023-09-20 RX ADMIN — INSULIN GLARGINE 5 UNIT(S): 100 INJECTION, SOLUTION SUBCUTANEOUS at 22:00

## 2023-09-20 RX ADMIN — HEPARIN SODIUM 5000 UNIT(S): 5000 INJECTION INTRAVENOUS; SUBCUTANEOUS at 09:45

## 2023-09-20 RX ADMIN — Medication 3 MILLILITER(S): at 20:21

## 2023-09-20 RX ADMIN — Medication 2: at 08:43

## 2023-09-20 NOTE — SWALLOW BEDSIDE ASSESSMENT ADULT - SWALLOW EVAL: RECOMMENDED FEEDING/EATING TECHNIQUES
allow for swallow between intakes/alternate food with liquid/maintain upright posture during/after eating for 30 mins/position upright (90 degrees)/small sips/bites

## 2023-09-20 NOTE — PROGRESS NOTE ADULT - SUBJECTIVE AND OBJECTIVE BOX
Subjective:   patient seen and examined,     Denies cough, expectoration, hemoptysis, pleuritic chest pain.    ROS:  Gen: Denies fever, chills, rigors  HEENT: Denies neck swelling, difficulty swallowing, nasal congestion  PULM: As above  Cardiology: Denies palpitation, dizziness, dyspnea    Vital Signs Last 24 Hrs  T(C): 36.8 (19 Sep 2023 20:00), Max: 36.8 (19 Sep 2023 20:00)  T(F): 98.3 (19 Sep 2023 20:00), Max: 98.3 (19 Sep 2023 20:00)  HR: 78 (20 Sep 2023 07:49) (74 - 82)  BP: 133/54 (20 Sep 2023 07:49) (119/59 - 133/54)  BP(mean): --  RR: 18 (20 Sep 2023 07:49) (17 - 18)  SpO2: 96% (20 Sep 2023 07:49) (95% - 96%)    Parameters below as of 20 Sep 2023 07:49  Patient On (Oxygen Delivery Method): nasal cannula  O2 Flow (L/min): 2    PHYSICAL EXAM:  General: Awake, alert, oriented to self   HEENT: Atraumatic, normocephalic.                  No tonsillar or pharyngeal exudates.  Lymph Nodes: No palpable lymphadenopathy  Neck: No JVD. No carotid bruit.   Respiratory: Normal chest expansion                         Normal percussion                         poor respiratory effors                         No wheeze, rhonchi or rales appreiciated  Cardiovascular: S1 S2 normal. No murmurs, rubs or gallops appreciated.   Abdomen: Soft, non-tender, non-distended. No organomegaly.  Extremities: Warm to touch.  No pedal edema.   .     MEDICATIONS  (STANDING):  albuterol    90 MICROgram(s) HFA Inhaler 2 Puff(s) Inhalation every 4 hours  albuterol/ipratropium for Nebulization 3 milliLiter(s) Nebulizer every 6 hours  amLODIPine   Tablet 5 milliGRAM(s) Oral daily  dextrose 5%. 1000 milliLiter(s) (100 mL/Hr) IV Continuous <Continuous>  dextrose 5%. 1000 milliLiter(s) (50 mL/Hr) IV Continuous <Continuous>  dextrose 50% Injectable 25 Gram(s) IV Push once  dextrose 50% Injectable 12.5 Gram(s) IV Push once  dextrose 50% Injectable 25 Gram(s) IV Push once  glucagon  Injectable 1 milliGRAM(s) IntraMuscular once  heparin   Injectable 5000 Unit(s) SubCutaneous every 12 hours  insulin glargine Injectable (LANTUS) 5 Unit(s) SubCutaneous at bedtime  insulin lispro (ADMELOG) corrective regimen sliding scale   SubCutaneous three times a day before meals  insulin lispro (ADMELOG) corrective regimen sliding scale   SubCutaneous at bedtime  memantine 10 milliGRAM(s) Oral two times a day  piperacillin/tazobactam IVPB.. 3.375 Gram(s) IV Intermittent every 8 hours  potassium chloride    Tablet ER 40 milliEquivalent(s) Oral once  sodium chloride 0.9%. 1000 milliLiter(s) (75 mL/Hr) IV Continuous <Continuous>    MEDICATIONS  (PRN):  acetaminophen     Tablet .. 650 milliGRAM(s) Oral every 6 hours PRN Temp greater or equal to 38C (100.4F), Mild Pain (1 - 3)  aluminum hydroxide/magnesium hydroxide/simethicone Suspension 30 milliLiter(s) Oral every 4 hours PRN Dyspepsia  benzonatate 100 milliGRAM(s) Oral every 8 hours PRN Cough  dextrose Oral Gel 15 Gram(s) Oral once PRN Blood Glucose LESS THAN 70 milliGRAM(s)/deciliter  guaiFENesin ER 1200 milliGRAM(s) Oral every 12 hours PRN Cough  melatonin 3 milliGRAM(s) Oral at bedtime PRN Insomnia  ondansetron Injectable 4 milliGRAM(s) IV Push every 8 hours PRN Nausea and/or Vomiting    Allergies    No Known Allergies    Intolerances        LABS:                        10.9   12.14 )-----------( 126      ( 20 Sep 2023 07:03 )             32.4     09-20    139  |  109<H>  |  31<H>  ----------------------------<  180<H>  3.4<L>   |  24  |  1.42<H>    Ca    8.1<L>      20 Sep 2023 07:03    TPro  5.4<L>  /  Alb  2.2<L>  /  TBili  0.7  /  DBili  x   /  AST  18  /  ALT  24  /  AlkPhos  66  09-19        RADIOLOGY & ADDITIONAL TESTS:

## 2023-09-20 NOTE — PROGRESS NOTE ADULT - ASSESSMENT
Patient is a 88-year-old female presents with weakness:     # Acute hypoxic respiratory distress and sepsis secondary to aspiration pneumonia  # Severe sepsis  # Lactic acidosis -  resolving 6 -> 3.5  # Elevated WBC -  trending down  #Bacteremia  - Blood cultures, Ucx + for E-COLI  - IV hydration  - c/w IV Zosyn /  s/p Zithromax  - CT chest: Patchy ground-glass opacities in the left upper lobe are of uncertain etiology.   - ID Dr. Roberson following    # Acute kidney injury   - Cr elv, trending down  (baseline 0.8), monitor closely   - s/p IVF in the ER, c/w gentle hydration with IVF at 75 ml/hr overnight (monitor for fluid overload)   - Avoid nephrotoxic medications   - Strict I+Os     # Hypokalemia   - K+ 3.4, replete  - f/u AM K+ and Mg levels     # Hyperglycemia   - Glucose 287, monitor   - Ordered HbA1c   - ISS for now -> add on basal bolus if persistently elevated accuchecks     # Microscopic hematuria   - UA small blood, RBC 3-5, many bacteria  - Recommend f/u UA in 4-6 weeks and if persistence of hematuria will need urology f/u     # History of dementia (A+Ox2 at baseline), HTN, Type 2 DM (not on medications currently, diet controlled), lower extremity swelling   - c/w home medications; verified with pt at the bedside  - Metamyelocytes 1% myelocytes 1%, trend     # DVT ppx:   - Heparin 5,000 units Q12H (hold for PLT <50,000)     # Code status: DNR, trial of intubation okay. Palliative care eval appreciated  Emergency contact: Franci Wesley (daughter) [phone]

## 2023-09-20 NOTE — PROGRESS NOTE ADULT - SUBJECTIVE AND OBJECTIVE BOX
HPI:    CODE STATUS: FULL CODE    9/19  pt w/ ecoli found in cultures  currently comfortable and in NAD  no complaints, pleasantly confused on exam      9/20  pt comfortable and in NAD  Patient was seen and examined.  Denies nausea, vomiting, shortness of breath, chest pain, headaches, abdominal pain, constipation  Team did discuss case again w/ pts daughter  at this time AD changed to DNR w/ Temporary intubation    Pain and Dyspnea:   denied    Review of Systems:    Anxiety- denies  Depression-denies  Physical Discomfort- in NAD  Dyspnea-denies  Constipation-denies  Diarrhea-denies  Nausea-denies  Vomiting-denies  Anorexia-denies  Weight Loss- denies  Cough-denies  Secretions-denies  Fatigue-denies  Weakness-denies  Delirium- some confusion    All other systems reviewed and negative               PHYSICAL EXAM:   ICU Vital Signs Last 24 Hrs  T(C): 36.8 (19 Sep 2023 20:00), Max: 36.8 (19 Sep 2023 20:00)  T(F): 98.3 (19 Sep 2023 20:00), Max: 98.3 (19 Sep 2023 20:00)  HR: 78 (20 Sep 2023 07:49) (74 - 82)  BP: 133/54 (20 Sep 2023 07:49) (119/59 - 133/54)  BP(mean): --  ABP: --  ABP(mean): --  RR: 18 (20 Sep 2023 07:49) (17 - 18)  SpO2: 96% (20 Sep 2023 07:49) (95% - 96%)    O2 Parameters below as of 20 Sep 2023 07:49  Patient On (Oxygen Delivery Method): nasal cannula  O2 Flow (L/min): 2          PPSV2: 50  %  FAST: 5    General: calm in NAD  Mental Status: awake alert oriented x2  HEENT: eomi, perrl  Lungs: ctabl b/l bs  no wheezing or rales  Cardiac: s1s2 no mgr  GI: soft nontender +BS  : voids  Ext: moves all 4 extremities spontaneously  Neuro: no gross findings       LABS and RADIOLOGY: REVIEWED

## 2023-09-20 NOTE — SWALLOW BEDSIDE ASSESSMENT ADULT - SWALLOW EVAL: DIAGNOSIS
details… oral-pharyngeal swallow skills appear subjectively to be within age-appropriate limits for regular texture diet.  Pt may well be microaspirating and silent aspiration cannot be ruled out. However, pt showed no overt s/s aspiration at bedside evaluation.   c normal affect/alert and oriented x3/normal behavior

## 2023-09-20 NOTE — PROGRESS NOTE ADULT - ASSESSMENT
88 year old female with PMH of dementia (A+Ox2 at baseline), HTN, Type 2 DM (not on medications currently, diet controlled), lower extremity swelling presents with weakness. per pt's daughter she returned home from dinner and a few hours later she found that her mothers slid off the couch and was slumped onto the floor. She had decreased energy, chills, cough productive of mucus, swelling of her lower extremities which is chronic and abdominal pain.  ER course: Temperature 103.1, heart rate 108-113, SPO2 93% on room air -> 93 to 95% on 3 L nasal cannula. Labs: Neutrophils 88% metamyelocytes 1% myelocytes 1%, potassium 3.3, creatinine 1.57 (baseline 0.8), glucose 287, lactate 3.5.  UA small blood, RBC 3-5, many bacteria.  COVID and RVP negative. CT abd/pelvis: Colonic diverticulosis without evidence of acute diverticulitis. Cholelithiasis without CT evidence of acute cholecystitis.  Patient was given Zosyn, 2200 mL of normal saline, IV Tylenol, 40 mEq of PO potassium.  She was admitted to med/surg for further management.     1. Severe sepsis with Ecoli. Pyuria. UTI. LAKISHA aspiration pneumonia. Metabolic encephalopathy. LORY  - imaging reviewed, ms improved  - blood cx/urine cx growing ecoli   - CT chest noted has LAKISHA pna probable aspiration related   - s/p rocephin, s/p  IV zosyn [id number] #2 change to rocephin 2gm daily   - s/p azithromycin 500mg daily #3/3   - continue with antibiotic coverage  - tolerating abx well so far; no side effects noted  - reason for abx use and side effects reviewed with patient  - on dc po ceftin 500mg BID complete total 14 day abx course  - supportive care  - fu cbc    2. other issues - care per medicine

## 2023-09-20 NOTE — SWALLOW BEDSIDE ASSESSMENT ADULT - SLP GENERAL OBSERVATIONS
pt seated in her chair: awake but frail looking and with sad expression on her face: stated immediately that she wants to go home.  When asked if she wanted to eat her lunch, stated , "no, I don't want anything, I want to go home." Yet Pt consented to try the testing samples and participated fully.

## 2023-09-20 NOTE — PROGRESS NOTE ADULT - CONVERSATION DETAILS
CPR and MV were discussed and at this time pts daughter agreed DNR would be most appropriate and inline w/ patients wishes form past discusions.    Also for MV options she is open at this time to do trial intubation but if failed weaned to comfort at that time.     DNR w. Trial intubation and No FEeding tubes completed

## 2023-09-20 NOTE — GOALS OF CARE CONVERSATION - ADVANCED CARE PLANNING - CONVERSATION DETAILS
HPI: As per medical record,   88 year old female with PMH of dementia (A+Ox2 at baseline), HTN, Type 2 DM (not on medications currently, diet controlled), lower extremity swelling presents with weakness. I spoke to the pt's daughter at the bedside. She states that the pt was doing well yesterday. She returned home from dinner and a few hours later she found that her mothers slid off the couch and was slumped onto the floor. She had decreased energy, chills, cough productive of mucus, swelling of her lower extremities which is chronic and abdominal pain.   (18 Sep 2023 03:35)      PERTINENT PMH REVIEWED:  [ x ] YES [ ] NO           Primary Contact: daughter Franci Wesley ([phone])    HCP [  ] Surrogate [ x  ] Guardian [   ]    Mental Status: [  ] Alert  [  ] Oriented [ x ] Confused [  ] Lethargic   Concerns of Depression [  ] unable to assess  Anxiety [   ] unable to assess  Baseline ADLs (prior to admission):  Independent [ ] moderately [ ] fully   Dependent   [ x ] moderately [ ]fully    Family Meeting attendees: Pt Franci has participated in discussions.     Anticipated Grief: Patient[  ] Family [ x ]   - daughter tearful during call     Caregiver Fithian Assessed: Yes [ x ] No [  ]    [employer]: Orthodoxy    Spiritual Concerns: none reported.  available PRN.     Goals of Care: Family wishes for pt to be home & for pt's wishes to be respected.     Previous Services: VNS, Private-hire aide    ADVANCE DIRECTIVES:  [ x ] YES [ ] NO    - MOLST completed to reflect DNR, tiral of intubation/non-invasive ventilation, NFT   - Daiughter reports Living Will at home      Anticipated D/C Plan: home w/chha & aides                     Summary: Caregiver Program spoke with rashad Koroma who requested to speak with us. SW reached out to rashad Koroma to offer support. Franci acknowledged speaking with Dr Figueroa & explains that she has been giving their discussion much thought. She requested to complete new MOLST. MOLST completed to reflect DNR, NFT, allow trial intubation. She explains that her mother has completed a Living Will which she has been reading & she reports that pt would not want to live long-term on a ventilator. Emotional support provided & SW relayed availability. MOLST placed on chart. ANNALEE Schaefer aware. Our team will continue to follow.

## 2023-09-20 NOTE — PROGRESS NOTE ADULT - SUBJECTIVE AND OBJECTIVE BOX
[facility]  88y  Female      Patient is a 88y old  Female who presents with a chief complaint of Weakness (20 Sep 2023 12:26)      INTERVAL HPI/OVERNIGHT EVENTS:  Seen and examined, resting comfortably in bed  No acute complaints  AAOX2      REVIEW OF SYSTEMS:  CONSTITUTIONAL: No fever, weight loss, or fatigue  EYES: No eye pain, visual disturbances, or discharge  ENMT:  No difficulty hearing, tinnitus, vertigo; No sinus or throat pain  NECK: No pain or stiffness  BREASTS: No pain, masses, or nipple discharge  RESPIRATORY: No cough, wheezing, chills or hemoptysis; No shortness of breath  CARDIOVASCULAR: No chest pain, palpitations, dizziness, or leg swelling  GASTROINTESTINAL: No abdominal or epigastric pain. No nausea, vomiting, or hematemesis; No diarrhea or constipation. No melena or hematochezia.  GENITOURINARY: No dysuria, frequency, hematuria, or incontinence  NEUROLOGICAL: No headaches, memory loss, loss of strength, numbness, or tremors  SKIN: No itching, burning, rashes, or lesions   LYMPH NODES: No enlarged glands  ENDOCRINE: No heat or cold intolerance; No hair loss  MUSCULOSKELETAL: No joint pain or swelling; No muscle, back, or extremity pain  PSYCHIATRIC: No depression, anxiety, mood swings, or difficulty sleeping  HEME/LYMPH: No easy bruising, or bleeding gums  ALLERY AND IMMUNOLOGIC: No hives or eczema    T(C): 36.8 (09-19-23 @ 20:00), Max: 36.8 (09-19-23 @ 20:00)  HR: 78 (09-20-23 @ 07:49) (74 - 82)  BP: 133/54 (09-20-23 @ 07:49) (119/59 - 133/54)  RR: 18 (09-20-23 @ 07:49) (17 - 18)  SpO2: 96% (09-20-23 @ 07:49) (95% - 96%)  Wt(kg): --Vital Signs Last 24 Hrs  T(C): 36.8 (19 Sep 2023 20:00), Max: 36.8 (19 Sep 2023 20:00)  T(F): 98.3 (19 Sep 2023 20:00), Max: 98.3 (19 Sep 2023 20:00)  HR: 78 (20 Sep 2023 07:49) (74 - 82)  BP: 133/54 (20 Sep 2023 07:49) (119/59 - 133/54)  BP(mean): --  RR: 18 (20 Sep 2023 07:49) (17 - 18)  SpO2: 96% (20 Sep 2023 07:49) (95% - 96%)    Parameters below as of 20 Sep 2023 07:49  Patient On (Oxygen Delivery Method): nasal cannula  O2 Flow (L/min): 2      PHYSICAL EXAM:  GENERAL: Elderly, frail  HEAD:  Atraumatic, Normocephalic  EYES: EOMI, PERRLA, conjunctiva and sclera clear  ENMT: +NC,  No tonsillar erythema, exudates, or enlargement; Moist mucous membranes, Good dentition, No lesions  NECK: Supple, No JVD, Normal thyroid  NERVOUS SYSTEM:  Alert & Oriented X2,   CHEST/LUNG: Clear to percussion bilaterally; No rales, rhonchi, wheezing, or rubs  HEART: Regular rate and rhythm; No murmurs, rubs, or gallops  ABDOMEN: Soft, Nontender, Nondistended; Bowel sounds present  EXTREMITIES:  2+ Peripheral Pulses, No clubbing, cyanosis, or edema  LYMPH: No lymphadenopathy noted  SKIN: No rashes or lesions    Consultant(s) Notes Reviewed:  [x ] YES  [ ] NO  Care Discussed with Consultants/Other Providers [ x] YES  [ ] NO    LABS:                        10.9   12.14 )-----------( 126      ( 20 Sep 2023 07:03 )             32.4     09-20    139  |  109<H>  |  31<H>  ----------------------------<  180<H>  3.4<L>   |  24  |  1.42<H>    Ca    8.1<L>      20 Sep 2023 07:03    TPro  5.4<L>  /  Alb  2.2<L>  /  TBili  0.7  /  DBili  x   /  AST  18  /  ALT  24  /  AlkPhos  66  09-19      Urinalysis Basic - ( 20 Sep 2023 07:03 )    Color: x / Appearance: x / SG: x / pH: x  Gluc: 180 mg/dL / Ketone: x  / Bili: x / Urobili: x   Blood: x / Protein: x / Nitrite: x   Leuk Esterase: x / RBC: x / WBC x   Sq Epi: x / Non Sq Epi: x / Bacteria: x      CAPILLARY BLOOD GLUCOSE      POCT Blood Glucose.: 165 mg/dL (20 Sep 2023 08:13)  POCT Blood Glucose.: 193 mg/dL (19 Sep 2023 21:58)  POCT Blood Glucose.: 126 mg/dL (19 Sep 2023 17:17)        Urinalysis Basic - ( 20 Sep 2023 07:03 )    Color: x / Appearance: x / SG: x / pH: x  Gluc: 180 mg/dL / Ketone: x  / Bili: x / Urobili: x   Blood: x / Protein: x / Nitrite: x   Leuk Esterase: x / RBC: x / WBC x   Sq Epi: x / Non Sq Epi: x / Bacteria: x        RADIOLOGY & ADDITIONAL TESTS:    Imaging Personally Reviewed:  [ ] YES  [ ] NO    HEALTH ISSUES - PROBLEM Dx:  Dementia

## 2023-09-20 NOTE — PROGRESS NOTE ADULT - ASSESSMENT
Process of Care  --Reviewed dx/treatment problems and alignment with Goals of Care    Cough   guaiFENesin ER 1200 milliGRAM(s) Oral every 12 hours PRN Cough  benzonatate 100 milliGRAM(s) Oral every 8 hours PRN Cough         Physical Aspects of Care  --Pain  patient denies at this time  c/w current managment    --Bowel Regimen  denies constipation  risk for constipation d/t immobility  daily dulcolax    --Dyspnea  No SOB at this time  comfortable and in NAD    --Nausea Vomiting  denies  zofran PRN     --Weakness  PT as tolerated     Psychological and Psychiatric Aspects of Care:   Grief Bereavement emotional support provided  --anticipatory grief     --Hx of psychiatric dx: none  -Pastoral Care Available PRN     Delirium PPX  Recommendations:   - melatonin 5mg qhs, to promote maintenance of circadian rhythm/restful sleep, and for ppx of future susceptibility to delirium upon resolution of presenting condition.  -Avoid deliriogenic agents including BZD, anticholinergics, and sedating agents if possible  -Re-orient frequently, encourage family at bedside as able.   -Early mobilization recommended if feasible.     Social Aspects of Care  -SW involved     Cultural Aspects  -Primary Language: English    Goals of Care:     pt now DNR w/ temporary intubation  c/w current managment  hope is for pt to return home     Ethical and Legal Aspects:   NA        Capacity: pt w/ simple capacity  HCP/Surrogate: ABHINAV daughter  Code Status: DNR W/ TEMPORARY INTUBATION  MOLST: na  Dispo Plan: pending improvement hopes to go home    Discussed With: Case coordinated with attending and SW and RN     Time Spent: 40 minutes including the care, coordination and counseling of this patient, 50% of which was spent coordinating and counseling.

## 2023-09-20 NOTE — SWALLOW BEDSIDE ASSESSMENT ADULT - COMMENTS
8 year old female with PMH of dementia (A+Ox2 at baseline), HTN, Type 2 DM (not on medications currently, diet controlled), lower extremity swelling presents with weakness. I spoke to the pt's daughter at the bedside. She states that the pt was doing well yesterday. She returned home from dinner and a few hours later she found that her mothers slid off the couch and was slumped onto the floor. She had decreased energy, chills, cough productive of mucus, swelling of her lower extremities which is chronic and abdominal pain.   Pt is thought to be aspirating. Serive is consulted forpo intake and to determine the level of diet texture.

## 2023-09-20 NOTE — SWALLOW BEDSIDE ASSESSMENT ADULT - SWALLOW EVAL: RECOMMENDED DIET
maintain regular texture choose softer options   thin liquids straw Ok . meds as tolerated. pt upright for meals. 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 Doing PT. He has required transfusions frequently, usually once per week.   Remains with drenching night sweats. He notes no fever, HA, visual problems, jaundice, dark urine, chest pain, SOB, abdominal pain, swollen glands, bleeding, bruising, hematuria, melena, rectal bleeding, dysuria, palpitations, rash, arthritis. Weight stable.

## 2023-09-20 NOTE — PROGRESS NOTE ADULT - ASSESSMENT
The patient is an 89 yo woman who was admitted to the hospital with cough, fatigue and LE swelling. Workup showed an abnormal CT scan of the chest. On my review there is LAKISHA pachy ground glass opacities. There is also some mosaicism as well through out the lungs    1. Ground glass opacities in Left upper lobe.   The differential for focal ground glass opacities is vast and includes etiologies including infection (viral, bacterial, atypical mycobacterium), inflammation (including due to rheumatological disease) , volume overload and mailgnancy. Given the current context bacterial infection and associated inflammation seems most plausible.       - Follow swallow eval  - Cont Abx per ID, volume status/ diuresis as needed.  - Repeat CT scan in 6 weeks.       2. LRTI  Noted Zosyn, per iD  Agree      3. Cough, PRN benzotate    4. Dysphagia: monitor clinically swallow eval.     Thank you for the consult. Will follow

## 2023-09-20 NOTE — PROGRESS NOTE ADULT - SUBJECTIVE AND OBJECTIVE BOX
Date of service: 09-20-23 @ 16:49    pt seen and examined  feels better  no complaints  afebrile  no resp distress     ROS: no fever or chills; denies dizziness, no HA, no SOB + cough, no abdominal pain, no diarrhea or constipation; + dysuria, no urinary frequency, no legs pain, no rashes      MEDICATIONS  (STANDING):  albuterol    90 MICROgram(s) HFA Inhaler 2 Puff(s) Inhalation every 4 hours  albuterol/ipratropium for Nebulization 3 milliLiter(s) Nebulizer every 6 hours  amLODIPine   Tablet 5 milliGRAM(s) Oral daily  cefTRIAXone Injectable. 2000 milliGRAM(s) IV Push every 24 hours  dextrose 5%. 1000 milliLiter(s) (50 mL/Hr) IV Continuous <Continuous>  dextrose 5%. 1000 milliLiter(s) (100 mL/Hr) IV Continuous <Continuous>  dextrose 50% Injectable 12.5 Gram(s) IV Push once  dextrose 50% Injectable 25 Gram(s) IV Push once  dextrose 50% Injectable 25 Gram(s) IV Push once  glucagon  Injectable 1 milliGRAM(s) IntraMuscular once  heparin   Injectable 5000 Unit(s) SubCutaneous every 12 hours  insulin glargine Injectable (LANTUS) 5 Unit(s) SubCutaneous at bedtime  insulin lispro (ADMELOG) corrective regimen sliding scale   SubCutaneous three times a day before meals  insulin lispro (ADMELOG) corrective regimen sliding scale   SubCutaneous at bedtime  memantine 10 milliGRAM(s) Oral two times a day  potassium chloride    Tablet ER 40 milliEquivalent(s) Oral once  sodium chloride 0.9%. 1000 milliLiter(s) (75 mL/Hr) IV Continuous <Continuous>    Vital Signs Last 24 Hrs  T(C): 36.8 (19 Sep 2023 20:00), Max: 36.8 (19 Sep 2023 20:00)  T(F): 98.3 (19 Sep 2023 20:00), Max: 98.3 (19 Sep 2023 20:00)  HR: 78 (20 Sep 2023 07:49) (74 - 82)  BP: 133/54 (20 Sep 2023 07:49) (119/59 - 133/54)  BP(mean): --  RR: 18 (20 Sep 2023 07:49) (17 - 18)  SpO2: 96% (20 Sep 2023 07:49) (95% - 96%)    Parameters below as of 20 Sep 2023 07:49  Patient On (Oxygen Delivery Method): nasal cannula  O2 Flow (L/min): 2          PE:  Constitutional: NAD   HEENT: NC/AT, EOMI, PERRLA, conjunctivae clear; ears and nose atraumatic; pharynx benign  Neck: supple; thyroid not palpable  Back: no tenderness  Respiratory: decreased breath sounds   Cardiovascular: S1S2 regular, no murmurs  Abdomen: soft, not tender, not distended, positive BS; liver and spleen WNL  Genitourinary: no suprapubic tenderness  Lymphatic: no LN palpable  Musculoskeletal: no muscle tenderness, no joint swelling or tenderness  Extremities: no pedal edema  Neurological/ Psychiatric:  moving all extremities  Skin: no rashes; no palpable lesions    Labs: all available labs reviewed                        10.9   12.14 )-----------( 126      ( 20 Sep 2023 07:03 )             32.4     09-20    139  |  109<H>  |  31<H>  ----------------------------<  180<H>  3.4<L>   |  24  |  1.42<H>    Ca    8.1<L>      20 Sep 2023 07:03    TPro  5.4<L>  /  Alb  2.2<L>  /  TBili  0.7  /  DBili  x   /  AST  18  /  ALT  24  /  AlkPhos  66  09-19      Urinalysis Basic - ( 18 Sep 2023 06:28 )    Color: x / Appearance: x / SG: x / pH: x  Gluc: 365 mg/dL / Ketone: x  / Bili: x / Urobili: x   Blood: x / Protein: x / Nitrite: x   Leuk Esterase: x / RBC: x / WBC x   Sq Epi: x / Non Sq Epi: x / Bacteria: x    Culture - Blood (09.17.23 @ 22:06)   - Escherichia coli: Detec  Gram Stain:   Growth in aerobic bottle: Gram Negative Rods   Growth in anaerobic bottle: Gram Negative Rods  Specimen Source: .Blood None  Organism: Blood Culture PCR  Culture Results:   Growth in aerobic bottle: Gram Negative Rods   Culture - Blood (09.17.23 @ 21:13)   Gram Stain:   Growth in aerobic bottle: Gram Negative Rods   Growth in anaerobic bottle: Gram Negative Rods  Specimen Source: .Blood Blood-Peripheral  Culture Results:   Growth in aerobic bottle: Gram Negative Rods   Growth in anaerobic bottle: Gram Negative Rods      Radiology: all available radiological tests reviewed  < from: CT Abdomen and Pelvis w/ IV Cont (09.17.23 @ 22:42) >    ACC: [id number] EXAM:  CT ABDOMEN AND PELVIS IC   ORDERED BY: MELISSA HU     PROCEDURE DATE:  09/17/2023          INTERPRETATION:  CLINICAL INFORMATION: Abdominal pain, fever    COMPARISON: None.    CONTRAST/COMPLICATIONS:  IV Contrast: Omnipaque [id number] cc administered   10 cc discarded  Oral Contrast: NONE  Complications: None reported at time of study completion    PROCEDURE:  CT of the Abdomen and Pelvis was performed.  Sagittal and coronal reformats were performed.    FINDINGS:  LOWER CHEST: Coronary artery calcifications versus stenting. Aortic valve   leaflet calcifications. Bibasilar subsegmental atelectasis.    LIVER: Within normal limits.  BILE DUCTS: Normal caliber.  GALLBLADDER: Cholelithiasis.  SPLEEN: Within normal limits.  PANCREAS: Mildly atrophic.  ADRENALS: Within normal limits.  KIDNEYS/URETERS: Bilateral renal cysts and low-density lesions too small   to characterize. Bilateral parapelvic cysts.    BLADDER: Contrast within the urinary bladder.  REPRODUCTIVE ORGANS: Uterus and adnexa within normal limits. 2.9 cm   simple appearing left adnexal cyst, likely benign.    BOWEL: No bowel obstruction. Moderate colonic stool burden. Colonic   diverticulosis without diverticulitis. Appendix is normal.  PERITONEUM: No ascites.  VESSELS: Atherosclerotic changes.  RETROPERITONEUM/LYMPH NODES: No lymphadenopathy.  ABDOMINAL WALL: Within normal limits.  BONES: Degenerative changes.    IMPRESSION:  Colonic diverticulosis without evidence of acute diverticulitis.    Cholelithiasis without CT evidence of acute cholecystitis.        --- End of Report ---    < end of copied text >  < from: Xray Chest 1 View- PORTABLE-Urgent (09.17.23 @ 22:34) >  ACC: [id number] EXAM:  XR CHEST PORTABLE URGENT 1V   ORDERED BY: MELISSA ALI     ACC: [id number] EXAM:  XR PELVIS AP ONLY 1-2 VIEWS   ORDERED BY: MELISSA HU     PROCEDURE DATE:  09/17/2023          INTERPRETATION:  Pelvis and chest. Patient has sepsis.    Pelvis.    Degenerative loss of disc height at L4-5 noted.    Hips are rather free of degeneration. No fracture. Contrast in the   bladder from CAT scan noted.    AP chest on September 17, 2023 at 10:29 PM.    Heart magnified by technique.    There are scattered infiltrates mainly in the midlung fields of uncertain   etiology. These infiltrates are essentially new since May 13, 2020.    IMPRESSION: Scattered perihilar infiltrates at this time. No fracture.    --- End of Report ---        < end of copied text >    Advanced directives addressed: full resuscitation

## 2023-09-21 LAB
-  AMIKACIN: SIGNIFICANT CHANGE UP
-  AMOXICILLIN/CLAVULANIC ACID: SIGNIFICANT CHANGE UP
-  AMPICILLIN/SULBACTAM: SIGNIFICANT CHANGE UP
-  AMPICILLIN: SIGNIFICANT CHANGE UP
-  AZTREONAM: SIGNIFICANT CHANGE UP
-  CEFAZOLIN: SIGNIFICANT CHANGE UP
-  CEFEPIME: SIGNIFICANT CHANGE UP
-  CEFOXITIN: SIGNIFICANT CHANGE UP
-  CEFTRIAXONE: SIGNIFICANT CHANGE UP
-  CEFUROXIME: SIGNIFICANT CHANGE UP
-  CIPROFLOXACIN: SIGNIFICANT CHANGE UP
-  ERTAPENEM: SIGNIFICANT CHANGE UP
-  GENTAMICIN: SIGNIFICANT CHANGE UP
-  IMIPENEM: SIGNIFICANT CHANGE UP
-  LEVOFLOXACIN: SIGNIFICANT CHANGE UP
-  MEROPENEM: SIGNIFICANT CHANGE UP
-  NITROFURANTOIN: SIGNIFICANT CHANGE UP
-  PIPERACILLIN/TAZOBACTAM: SIGNIFICANT CHANGE UP
-  TOBRAMYCIN: SIGNIFICANT CHANGE UP
-  TRIMETHOPRIM/SULFAMETHOXAZOLE: SIGNIFICANT CHANGE UP
ANION GAP SERPL CALC-SCNC: 5 MMOL/L — SIGNIFICANT CHANGE UP (ref 5–17)
BASOPHILS # BLD AUTO: 0.08 K/UL — SIGNIFICANT CHANGE UP (ref 0–0.2)
BASOPHILS NFR BLD AUTO: 0.9 % — SIGNIFICANT CHANGE UP (ref 0–2)
BUN SERPL-MCNC: 24 MG/DL — HIGH (ref 7–23)
CALCIUM SERPL-MCNC: 8.5 MG/DL — SIGNIFICANT CHANGE UP (ref 8.5–10.1)
CHLORIDE SERPL-SCNC: 113 MMOL/L — HIGH (ref 96–108)
CO2 SERPL-SCNC: 23 MMOL/L — SIGNIFICANT CHANGE UP (ref 22–31)
CREAT SERPL-MCNC: 1.09 MG/DL — SIGNIFICANT CHANGE UP (ref 0.5–1.3)
CULTURE RESULTS: SIGNIFICANT CHANGE UP
EGFR: 49 ML/MIN/1.73M2 — LOW
EOSINOPHIL # BLD AUTO: 0.15 K/UL — SIGNIFICANT CHANGE UP (ref 0–0.5)
EOSINOPHIL NFR BLD AUTO: 1.7 % — SIGNIFICANT CHANGE UP (ref 0–6)
GLUCOSE BLDC GLUCOMTR-MCNC: 128 MG/DL — HIGH (ref 70–99)
GLUCOSE BLDC GLUCOMTR-MCNC: 141 MG/DL — HIGH (ref 70–99)
GLUCOSE BLDC GLUCOMTR-MCNC: 257 MG/DL — HIGH (ref 70–99)
GLUCOSE BLDC GLUCOMTR-MCNC: 91 MG/DL — SIGNIFICANT CHANGE UP (ref 70–99)
GLUCOSE BLDC GLUCOMTR-MCNC: 92 MG/DL — SIGNIFICANT CHANGE UP (ref 70–99)
GLUCOSE SERPL-MCNC: 91 MG/DL — SIGNIFICANT CHANGE UP (ref 70–99)
HCT VFR BLD CALC: 34.4 % — LOW (ref 34.5–45)
HGB BLD-MCNC: 11.4 G/DL — LOW (ref 11.5–15.5)
IMM GRANULOCYTES NFR BLD AUTO: 0.9 % — SIGNIFICANT CHANGE UP (ref 0–0.9)
LYMPHOCYTES # BLD AUTO: 1.73 K/UL — SIGNIFICANT CHANGE UP (ref 1–3.3)
LYMPHOCYTES # BLD AUTO: 19.7 % — SIGNIFICANT CHANGE UP (ref 13–44)
MAGNESIUM SERPL-MCNC: 2.1 MG/DL — SIGNIFICANT CHANGE UP (ref 1.6–2.6)
MCHC RBC-ENTMCNC: 29.1 PG — SIGNIFICANT CHANGE UP (ref 27–34)
MCHC RBC-ENTMCNC: 33.1 GM/DL — SIGNIFICANT CHANGE UP (ref 32–36)
MCV RBC AUTO: 87.8 FL — SIGNIFICANT CHANGE UP (ref 80–100)
METHOD TYPE: SIGNIFICANT CHANGE UP
MONOCYTES # BLD AUTO: 0.78 K/UL — SIGNIFICANT CHANGE UP (ref 0–0.9)
MONOCYTES NFR BLD AUTO: 8.9 % — SIGNIFICANT CHANGE UP (ref 2–14)
NEUTROPHILS # BLD AUTO: 5.97 K/UL — SIGNIFICANT CHANGE UP (ref 1.8–7.4)
NEUTROPHILS NFR BLD AUTO: 67.9 % — SIGNIFICANT CHANGE UP (ref 43–77)
ORGANISM # SPEC MICROSCOPIC CNT: SIGNIFICANT CHANGE UP
ORGANISM # SPEC MICROSCOPIC CNT: SIGNIFICANT CHANGE UP
PLATELET # BLD AUTO: 159 K/UL — SIGNIFICANT CHANGE UP (ref 150–400)
POTASSIUM SERPL-MCNC: 4.1 MMOL/L — SIGNIFICANT CHANGE UP (ref 3.5–5.3)
POTASSIUM SERPL-SCNC: 4.1 MMOL/L — SIGNIFICANT CHANGE UP (ref 3.5–5.3)
RBC # BLD: 3.92 M/UL — SIGNIFICANT CHANGE UP (ref 3.8–5.2)
RBC # FLD: 15.2 % — HIGH (ref 10.3–14.5)
SODIUM SERPL-SCNC: 141 MMOL/L — SIGNIFICANT CHANGE UP (ref 135–145)
SPECIMEN SOURCE: SIGNIFICANT CHANGE UP
WBC # BLD: 8.79 K/UL — SIGNIFICANT CHANGE UP (ref 3.8–10.5)
WBC # FLD AUTO: 8.79 K/UL — SIGNIFICANT CHANGE UP (ref 3.8–10.5)

## 2023-09-21 PROCEDURE — 99232 SBSQ HOSP IP/OBS MODERATE 35: CPT

## 2023-09-21 PROCEDURE — 99233 SBSQ HOSP IP/OBS HIGH 50: CPT

## 2023-09-21 RX ADMIN — INSULIN GLARGINE 5 UNIT(S): 100 INJECTION, SOLUTION SUBCUTANEOUS at 22:00

## 2023-09-21 RX ADMIN — HEPARIN SODIUM 5000 UNIT(S): 5000 INJECTION INTRAVENOUS; SUBCUTANEOUS at 08:54

## 2023-09-21 RX ADMIN — CEFTRIAXONE 2000 MILLIGRAM(S): 500 INJECTION, POWDER, FOR SOLUTION INTRAMUSCULAR; INTRAVENOUS at 15:33

## 2023-09-21 RX ADMIN — Medication 3 MILLILITER(S): at 04:02

## 2023-09-21 RX ADMIN — Medication 3 MILLILITER(S): at 07:47

## 2023-09-21 RX ADMIN — MEMANTINE HYDROCHLORIDE 10 MILLIGRAM(S): 10 TABLET ORAL at 08:54

## 2023-09-21 RX ADMIN — HEPARIN SODIUM 5000 UNIT(S): 5000 INJECTION INTRAVENOUS; SUBCUTANEOUS at 21:58

## 2023-09-21 RX ADMIN — Medication 3 MILLIGRAM(S): at 21:58

## 2023-09-21 RX ADMIN — AMLODIPINE BESYLATE 5 MILLIGRAM(S): 2.5 TABLET ORAL at 08:54

## 2023-09-21 RX ADMIN — MEMANTINE HYDROCHLORIDE 10 MILLIGRAM(S): 10 TABLET ORAL at 21:58

## 2023-09-21 NOTE — PROGRESS NOTE ADULT - SUBJECTIVE AND OBJECTIVE BOX
Subjective:   patient seen and examined, No acute events. Friend at the bedsite.     Denies cough, expectoration, hemoptysis, pleuritic chest pain.    Vital Signs Last 24 Hrs  T(C): 36.9 (21 Sep 2023 08:06), Max: 36.9 (20 Sep 2023 20:15)  T(F): 98.5 (21 Sep 2023 08:06), Max: 98.5 (20 Sep 2023 20:15)  HR: 75 (21 Sep 2023 08:06) (71 - 75)  BP: 139/63 (21 Sep 2023 08:06) (136/51 - 139/63)  BP(mean): --  RR: 18 (21 Sep 2023 08:06) (18 - 19)  SpO2: 100% (21 Sep 2023 08:06) (94% - 100%)    Parameters below as of 20 Sep 2023 20:31  Patient On (Oxygen Delivery Method): nasal cannula    PHYSICAL EXAM:  General: Awake, alert, oriented to self   HEENT: Atraumatic, normocephalic.                  No tonsillar or pharyngeal exudates.  Lymph Nodes: No palpable lymphadenopathy  Neck: No JVD. No carotid bruit.   Respiratory: Normal chest expansion                         Normal percussion                         poor respiratory effort                         No wheeze, rhonchi or rales appreciated  Cardiovascular: S1 S2 normal. No murmurs, rubs or gallops appreciated.   Abdomen: Soft, non-tender, non-distended. No organomegaly.  Extremities: Warm to touch.  No pedal edema.     MEDICATIONS  (STANDING):  albuterol    90 MICROgram(s) HFA Inhaler 2 Puff(s) Inhalation every 4 hours  albuterol/ipratropium for Nebulization 3 milliLiter(s) Nebulizer every 6 hours  amLODIPine   Tablet 5 milliGRAM(s) Oral daily  cefTRIAXone Injectable. 2000 milliGRAM(s) IV Push every 24 hours  dextrose 5%. 1000 milliLiter(s) (100 mL/Hr) IV Continuous <Continuous>  dextrose 5%. 1000 milliLiter(s) (50 mL/Hr) IV Continuous <Continuous>  dextrose 50% Injectable 25 Gram(s) IV Push once  dextrose 50% Injectable 12.5 Gram(s) IV Push once  dextrose 50% Injectable 25 Gram(s) IV Push once  glucagon  Injectable 1 milliGRAM(s) IntraMuscular once  heparin   Injectable 5000 Unit(s) SubCutaneous every 12 hours  insulin glargine Injectable (LANTUS) 5 Unit(s) SubCutaneous at bedtime  insulin lispro (ADMELOG) corrective regimen sliding scale   SubCutaneous three times a day before meals  insulin lispro (ADMELOG) corrective regimen sliding scale   SubCutaneous at bedtime  memantine 10 milliGRAM(s) Oral two times a day  sodium chloride 0.9%. 1000 milliLiter(s) (75 mL/Hr) IV Continuous <Continuous>    MEDICATIONS  (PRN):  acetaminophen     Tablet .. 650 milliGRAM(s) Oral every 6 hours PRN Temp greater or equal to 38C (100.4F), Mild Pain (1 - 3)  aluminum hydroxide/magnesium hydroxide/simethicone Suspension 30 milliLiter(s) Oral every 4 hours PRN Dyspepsia  benzonatate 100 milliGRAM(s) Oral every 8 hours PRN Cough  dextrose Oral Gel 15 Gram(s) Oral once PRN Blood Glucose LESS THAN 70 milliGRAM(s)/deciliter  guaiFENesin ER 1200 milliGRAM(s) Oral every 12 hours PRN Cough  melatonin 3 milliGRAM(s) Oral at bedtime PRN Insomnia  ondansetron Injectable 4 milliGRAM(s) IV Push every 8 hours PRN Nausea and/or Vomiting          RADIOLOGY & ADDITIONAL TESTS:    Clinical Impressions:  · Diagnosis	oral-pharyngeal swallow skills appear subjectively to be within age-appropriate limits for regular texture diet.  Pt may well be microaspirating and silent aspiration cannot be ruled out. However, pt showed no overt s/s aspiration at bedside evaluation.   c  · Criteria for Skilled Therapeutic Interventions Met	will follow for continued tolerance.  · Therapy Frequency	as schedule permits

## 2023-09-21 NOTE — PROGRESS NOTE ADULT - ASSESSMENT
The patient is an 89 yo woman who was admitted to the hospital with cough, fatigue and LE swelling. Workup showed an abnormal CT scan of the chest. On my review there is LAKISHA pachy ground glass opacities. There is also some mosaicism as well through out the lungs    1. Ground glass opacities in Left upper lobe.   The differential for focal ground glass opacities is vast and includes etiologies including infection (viral, bacterial, atypical mycobacterium), inflammation (including due to rheumatological disease) , volume overload and mailgnancy. Given the current context bacterial infection and associated inflammation seems most plausible.       9/21: swallow eval was unremarkable.   I will  order  a repeat CXR.   monitor pulmonary status.  will need a repeat CT scan in 2-3 months.       2. LRTI  Noted Zosyn, per iD  Agree      3. Cough, PRN benzotate  i will adjust inhalers    4. Deconditioning: PT/OT    d/w RN and family at bedside.

## 2023-09-21 NOTE — PROGRESS NOTE ADULT - SUBJECTIVE AND OBJECTIVE BOX
RONALDO LAURO  88y  Female      Patient is a 88y old  Female who presents with a chief complaint of Weakness (21 Sep 2023 11:42)      INTERVAL HPI/OVERNIGHT EVENTS:  Seen and examined with friend at bedside and daughter on speaker phone  Patient herself feels better and wishes to go home, daughter reports pt is feeling weak and is not ready. Dtr reports she can not accept mom home today.      REVIEW OF SYSTEMS:  CONSTITUTIONAL: No fever, weight loss, or fatigue  EYES: No eye pain, visual disturbances, or discharge  ENMT:  No difficulty hearing, tinnitus, vertigo; No sinus or throat pain  NECK: No pain or stiffness  BREASTS: No pain, masses, or nipple discharge  RESPIRATORY: No cough, wheezing, chills or hemoptysis; No shortness of breath  CARDIOVASCULAR: No chest pain, palpitations, dizziness, or leg swelling  GASTROINTESTINAL: No abdominal or epigastric pain. No nausea, vomiting, or hematemesis; No diarrhea or constipation. No melena or hematochezia.  GENITOURINARY: No dysuria, frequency, hematuria, or incontinence  NEUROLOGICAL: No headaches, memory loss, loss of strength, numbness, or tremors  SKIN: No itching, burning, rashes, or lesions   LYMPH NODES: No enlarged glands  ENDOCRINE: No heat or cold intolerance; No hair loss  MUSCULOSKELETAL: No joint pain or swelling; No muscle, back, or extremity pain  PSYCHIATRIC: No depression, anxiety, mood swings, or difficulty sleeping  HEME/LYMPH: No easy bruising, or bleeding gums  ALLERY AND IMMUNOLOGIC: No hives or eczema    T(C): 36.9 (09-21-23 @ 08:06), Max: 36.9 (09-20-23 @ 20:15)  HR: 75 (09-21-23 @ 08:06) (71 - 75)  BP: 139/63 (09-21-23 @ 08:06) (136/51 - 139/63)  RR: 18 (09-21-23 @ 08:06) (18 - 19)  SpO2: 100% (09-21-23 @ 08:06) (94% - 100%)  Wt(kg): --Vital Signs Last 24 Hrs  T(C): 36.9 (21 Sep 2023 08:06), Max: 36.9 (20 Sep 2023 20:15)  T(F): 98.5 (21 Sep 2023 08:06), Max: 98.5 (20 Sep 2023 20:15)  HR: 75 (21 Sep 2023 08:06) (71 - 75)  BP: 139/63 (21 Sep 2023 08:06) (136/51 - 139/63)  BP(mean): --  RR: 18 (21 Sep 2023 08:06) (18 - 19)  SpO2: 100% (21 Sep 2023 08:06) (94% - 100%)    Parameters below as of 20 Sep 2023 20:31  Patient On (Oxygen Delivery Method): nasal cannula        PHYSICAL EXAM:  GENERAL: Elderly, frail  HEAD:  Atraumatic, Normocephalic  EYES: EOMI, PERRLA, conjunctiva and sclera clear  ENMT: +NC,  No tonsillar erythema, exudates, or enlargement; Moist mucous membranes, Good dentition, No lesions  NECK: Supple, No JVD, Normal thyroid  NERVOUS SYSTEM:  Alert & Oriented X2,   CHEST/LUNG: Clear to percussion bilaterally; No rales, rhonchi, wheezing, or rubs  HEART: Regular rate and rhythm; No murmurs, rubs, or gallops  ABDOMEN: Soft, Nontender, Nondistended; Bowel sounds present  EXTREMITIES:  2+ Peripheral Pulses, No clubbing, cyanosis, or edema  LYMPH: No lymphadenopathy noted  SKIN: No rashes or lesions    LABS:                        11.4   8.79  )-----------( 159      ( 21 Sep 2023 06:58 )             34.4     09-21    141  |  113<H>  |  24<H>  ----------------------------<  91  4.1   |  23  |  1.09    Ca    8.5      21 Sep 2023 06:58  Mg     2.1     09-21        Urinalysis Basic - ( 21 Sep 2023 06:58 )    Color: x / Appearance: x / SG: x / pH: x  Gluc: 91 mg/dL / Ketone: x  / Bili: x / Urobili: x   Blood: x / Protein: x / Nitrite: x   Leuk Esterase: x / RBC: x / WBC x   Sq Epi: x / Non Sq Epi: x / Bacteria: x      CAPILLARY BLOOD GLUCOSE      POCT Blood Glucose.: 92 mg/dL (21 Sep 2023 11:42)  POCT Blood Glucose.: 91 mg/dL (21 Sep 2023 08:00)  POCT Blood Glucose.: 135 mg/dL (20 Sep 2023 21:55)  POCT Blood Glucose.: 115 mg/dL (20 Sep 2023 17:17)        Urinalysis Basic - ( 21 Sep 2023 06:58 )    Color: x / Appearance: x / SG: x / pH: x  Gluc: 91 mg/dL / Ketone: x  / Bili: x / Urobili: x   Blood: x / Protein: x / Nitrite: x   Leuk Esterase: x / RBC: x / WBC x   Sq Epi: x / Non Sq Epi: x / Bacteria: x        RADIOLOGY & ADDITIONAL TESTS:    Imaging Personally Reviewed:  [ ] YES  [ ] NO    HEALTH ISSUES - PROBLEM Dx:  Dementia

## 2023-09-21 NOTE — PROGRESS NOTE ADULT - ASSESSMENT
88-year-old female presents with weakness:     # Acute hypoxic respiratory distress and sepsis secondary to aspiration pneumonia  # Severe sepsis  # Lactic acidosis -  resolving 6 -> 3.5  # Elevated WBC -  trending down  #Bacteremia  - Blood cultures, Ucx + for E-COLI  - IV hydration  - c/w IV Zosyn /  s/p Zithromax, on dc ID rec PO Ceftin 500mg BID complete total 14 day abx course  - CT chest: Patchy ground-glass opacities in the left upper lobe are of uncertain etiology.   - ID Dr. Roberson following    # Acute kidney injury   - Cr elv, trending down  (baseline 0.8), monitor closely   - s/p IVF in the ER, and gentle fluids, now tolerating PO hydration  - Avoid nephrotoxic medications   - Strict I+Os   -Monitor BMP    # Hypokalemia   - Resolved s/p repletion  - f/u AM K+     # Hyperglycemia   -  -   HbA1c 7.9  - ISS,5 u basal added    # Microscopic hematuria   - UA small blood, RBC 3-5, many bacteria  - Recommend f/u UA in 4-6 weeks and if persistence of hematuria will need urology f/u     # History of dementia (A+Ox2 at baseline), HTN, Type 2 DM (not on medications currently, diet controlled), lower extremity swelling   - c/w home medications; verified with pt at the bedside  - Metamyelocytes 1% myelocytes 1%, trend     # DVT ppx:   - Heparin 5,000 units Q12H (hold for PLT <50,000)     # Code status: DNR, trial of intubation okay. Palliative care eval appreciated  Emergency contact: Franci Wesley (daughter) [phone]

## 2023-09-22 LAB
ANION GAP SERPL CALC-SCNC: 6 MMOL/L — SIGNIFICANT CHANGE UP (ref 5–17)
BASOPHILS # BLD AUTO: 0.08 K/UL — SIGNIFICANT CHANGE UP (ref 0–0.2)
BASOPHILS NFR BLD AUTO: 1 % — SIGNIFICANT CHANGE UP (ref 0–2)
BUN SERPL-MCNC: 20 MG/DL — SIGNIFICANT CHANGE UP (ref 7–23)
CALCIUM SERPL-MCNC: 8.8 MG/DL — SIGNIFICANT CHANGE UP (ref 8.5–10.1)
CHLORIDE SERPL-SCNC: 113 MMOL/L — HIGH (ref 96–108)
CO2 SERPL-SCNC: 22 MMOL/L — SIGNIFICANT CHANGE UP (ref 22–31)
CREAT SERPL-MCNC: 0.99 MG/DL — SIGNIFICANT CHANGE UP (ref 0.5–1.3)
EGFR: 55 ML/MIN/1.73M2 — LOW
EOSINOPHIL # BLD AUTO: 0 K/UL — SIGNIFICANT CHANGE UP (ref 0–0.5)
EOSINOPHIL NFR BLD AUTO: 0 % — SIGNIFICANT CHANGE UP (ref 0–6)
GLUCOSE BLDC GLUCOMTR-MCNC: 102 MG/DL — HIGH (ref 70–99)
GLUCOSE SERPL-MCNC: 125 MG/DL — HIGH (ref 70–99)
HCT VFR BLD CALC: 33.5 % — LOW (ref 34.5–45)
HGB BLD-MCNC: 11.3 G/DL — LOW (ref 11.5–15.5)
LACTATE SERPL-SCNC: 0.6 MMOL/L — LOW (ref 0.7–2)
LYMPHOCYTES # BLD AUTO: 2.45 K/UL — SIGNIFICANT CHANGE UP (ref 1–3.3)
LYMPHOCYTES # BLD AUTO: 30 % — SIGNIFICANT CHANGE UP (ref 13–44)
MANUAL SMEAR VERIFICATION: SIGNIFICANT CHANGE UP
MCHC RBC-ENTMCNC: 29.4 PG — SIGNIFICANT CHANGE UP (ref 27–34)
MCHC RBC-ENTMCNC: 33.7 GM/DL — SIGNIFICANT CHANGE UP (ref 32–36)
MCV RBC AUTO: 87.2 FL — SIGNIFICANT CHANGE UP (ref 80–100)
METAMYELOCYTES # FLD: 1 % — HIGH (ref 0–0)
MONOCYTES # BLD AUTO: 0.9 K/UL — SIGNIFICANT CHANGE UP (ref 0–0.9)
MONOCYTES NFR BLD AUTO: 11 % — SIGNIFICANT CHANGE UP (ref 2–14)
NEUTROPHILS # BLD AUTO: 4.66 K/UL — SIGNIFICANT CHANGE UP (ref 1.8–7.4)
NEUTROPHILS NFR BLD AUTO: 56 % — SIGNIFICANT CHANGE UP (ref 43–77)
NEUTS BAND # BLD: 1 % — SIGNIFICANT CHANGE UP (ref 0–8)
NRBC # BLD: 0 /100 — SIGNIFICANT CHANGE UP (ref 0–0)
NRBC # BLD: SIGNIFICANT CHANGE UP /100 WBCS (ref 0–0)
PLAT MORPH BLD: NORMAL — SIGNIFICANT CHANGE UP
PLATELET # BLD AUTO: 164 K/UL — SIGNIFICANT CHANGE UP (ref 150–400)
PLATELET COUNT - ESTIMATE: NORMAL — SIGNIFICANT CHANGE UP
POTASSIUM SERPL-MCNC: 4 MMOL/L — SIGNIFICANT CHANGE UP (ref 3.5–5.3)
POTASSIUM SERPL-SCNC: 4 MMOL/L — SIGNIFICANT CHANGE UP (ref 3.5–5.3)
RBC # BLD: 3.84 M/UL — SIGNIFICANT CHANGE UP (ref 3.8–5.2)
RBC # FLD: 15.1 % — HIGH (ref 10.3–14.5)
RBC BLD AUTO: NORMAL — SIGNIFICANT CHANGE UP
SODIUM SERPL-SCNC: 141 MMOL/L — SIGNIFICANT CHANGE UP (ref 135–145)
WBC # BLD: 8.17 K/UL — SIGNIFICANT CHANGE UP (ref 3.8–10.5)
WBC # FLD AUTO: 8.17 K/UL — SIGNIFICANT CHANGE UP (ref 3.8–10.5)

## 2023-09-22 PROCEDURE — 99233 SBSQ HOSP IP/OBS HIGH 50: CPT

## 2023-09-22 PROCEDURE — 71045 X-RAY EXAM CHEST 1 VIEW: CPT | Mod: 26

## 2023-09-22 RX ORDER — AMLODIPINE BESYLATE 2.5 MG/1
10 TABLET ORAL DAILY
Refills: 0 | Status: DISCONTINUED | OUTPATIENT
Start: 2023-09-23 | End: 2023-09-24

## 2023-09-22 RX ORDER — FUROSEMIDE 40 MG
20 TABLET ORAL DAILY
Refills: 0 | Status: DISCONTINUED | OUTPATIENT
Start: 2023-09-23 | End: 2023-09-24

## 2023-09-22 RX ORDER — CEFTRIAXONE 500 MG/1
2000 INJECTION, POWDER, FOR SOLUTION INTRAMUSCULAR; INTRAVENOUS EVERY 24 HOURS
Refills: 0 | Status: DISCONTINUED | OUTPATIENT
Start: 2023-09-22 | End: 2023-09-22

## 2023-09-22 RX ORDER — CEFUROXIME AXETIL 250 MG
500 TABLET ORAL EVERY 12 HOURS
Refills: 0 | Status: DISCONTINUED | OUTPATIENT
Start: 2023-09-22 | End: 2023-09-22

## 2023-09-22 RX ORDER — INSULIN GLARGINE 100 [IU]/ML
3 INJECTION, SOLUTION SUBCUTANEOUS AT BEDTIME
Refills: 0 | Status: DISCONTINUED | OUTPATIENT
Start: 2023-09-22 | End: 2023-09-24

## 2023-09-22 RX ORDER — CEFTRIAXONE 500 MG/1
2000 INJECTION, POWDER, FOR SOLUTION INTRAMUSCULAR; INTRAVENOUS EVERY 24 HOURS
Refills: 0 | Status: DISCONTINUED | OUTPATIENT
Start: 2023-09-22 | End: 2023-09-24

## 2023-09-22 RX ORDER — FUROSEMIDE 40 MG
20 TABLET ORAL ONCE
Refills: 0 | Status: COMPLETED | OUTPATIENT
Start: 2023-09-22 | End: 2023-09-22

## 2023-09-22 RX ORDER — POLYETHYLENE GLYCOL 3350 17 G/17G
17 POWDER, FOR SOLUTION ORAL DAILY
Refills: 0 | Status: DISCONTINUED | OUTPATIENT
Start: 2023-09-22 | End: 2023-09-24

## 2023-09-22 RX ADMIN — Medication 500 MILLIGRAM(S): at 12:17

## 2023-09-22 RX ADMIN — Medication 20 MILLIGRAM(S): at 15:36

## 2023-09-22 RX ADMIN — HEPARIN SODIUM 5000 UNIT(S): 5000 INJECTION INTRAVENOUS; SUBCUTANEOUS at 20:36

## 2023-09-22 RX ADMIN — INSULIN GLARGINE 3 UNIT(S): 100 INJECTION, SOLUTION SUBCUTANEOUS at 20:36

## 2023-09-22 RX ADMIN — HEPARIN SODIUM 5000 UNIT(S): 5000 INJECTION INTRAVENOUS; SUBCUTANEOUS at 08:44

## 2023-09-22 RX ADMIN — CEFTRIAXONE 2000 MILLIGRAM(S): 500 INJECTION, POWDER, FOR SOLUTION INTRAMUSCULAR; INTRAVENOUS at 20:36

## 2023-09-22 RX ADMIN — MEMANTINE HYDROCHLORIDE 10 MILLIGRAM(S): 10 TABLET ORAL at 20:37

## 2023-09-22 RX ADMIN — MEMANTINE HYDROCHLORIDE 10 MILLIGRAM(S): 10 TABLET ORAL at 08:44

## 2023-09-22 RX ADMIN — AMLODIPINE BESYLATE 5 MILLIGRAM(S): 2.5 TABLET ORAL at 08:44

## 2023-09-22 NOTE — PROGRESS NOTE ADULT - ASSESSMENT
The patient is an 87 yo woman who was admitted to the hospital with cough, fatigue and LE swelling. Workup showed an abnormal CT scan of the chest.     1. B/L interstitial infiltrates in upper lobes:  Repeat CXR showed increased bilateral opacities. Infection is unlikely - no fever, WBC, clinical symptoms etc.   ILD would not have this quick progression.  Suspect that it is cardiac related. No recent ECHO in chart.   Would recommend a trial of Diuresis      2. Cough, improved    4. r/o Dysphagia: negative swallow studies.     D/w Dr. Santiago.

## 2023-09-22 NOTE — PHARMACOTHERAPY INTERVENTION NOTE - COMMENTS
Patient with e.coli bacteremia was being treated with zosyn - culture report resulted pansensitive, therefore, recommended de-escalating from zosyn to ceftriaxone 2 g daily
changed ceftraixone to ceftiin on discharge, patient has no IV access
discontinued azithro 500mg - completed 3 days of therapy 
unk

## 2023-09-22 NOTE — PROGRESS NOTE ADULT - SUBJECTIVE AND OBJECTIVE BOX
HPI: 88 year old female with PMH of dementia (A+Ox2 at baseline), HTN, Type 2 DM (not on medications currently, diet controlled), lower extremity swelling presented with weakness. As per pt's daughter, pt was doing well on day prior to admission, she returned home from dinner and a few hours later she found that her mother slid off the couch and was slumped onto the floor. She had decreased energy, chills, cough productive of mucus, swelling of her lower extremities which is chronic and abdominal pain. ER course: Temperature 103.1, heart rate 108-113, SPO2 93% on room air -> 93 to 95% on 3 L nasal cannula. Labs: Neutrophils 88% metamyelocytes 1% myelocytes 1%, potassium 3.3, creatinine 1.57 (baseline 0.8), glucose 287, lactate 3.5.  UA small blood, RBC 3-5, many bacteria.  COVID and RVP negative. Patient was given Zosyn, 2200 mL of normal saline, IV Tylenol, 40 mEq of PO potassium.     Subjective: Patient seen today, awake, alert, feels tired but better than yesterday, no overnight issues reported.     REVIEW OF SYSTEMS:    CONSTITUTIONAL: + weakness, fevers or chills  EYES/ENT: No visual changes;  No vertigo or throat pain   NECK: No pain or stiffness  RESPIRATORY: No cough, wheezing, hemoptysis; No shortness of breath  CARDIOVASCULAR: No chest pain or palpitations  GASTROINTESTINAL: No abdominal or epigastric pain. No nausea, vomiting, or hematemesis; No diarrhea or constipation. No melena or hematochezia.  GENITOURINARY: No dysuria, frequency or hematuria  NEUROLOGICAL: No numbness or weakness  SKIN: No itching, rashes        MEDICATIONS  (STANDING):  amLODIPine   Tablet 5 milliGRAM(s) Oral daily  cefTRIAXone Injectable. 2000 milliGRAM(s) IV Push every 24 hours  dextrose 5%. 1000 milliLiter(s) (50 mL/Hr) IV Continuous <Continuous>  dextrose 5%. 1000 milliLiter(s) (100 mL/Hr) IV Continuous <Continuous>  dextrose 50% Injectable 12.5 Gram(s) IV Push once  dextrose 50% Injectable 25 Gram(s) IV Push once  dextrose 50% Injectable 25 Gram(s) IV Push once  glucagon  Injectable 1 milliGRAM(s) IntraMuscular once  heparin   Injectable 5000 Unit(s) SubCutaneous every 12 hours  insulin glargine Injectable (LANTUS) 5 Unit(s) SubCutaneous at bedtime  insulin lispro (ADMELOG) corrective regimen sliding scale   SubCutaneous three times a day before meals  insulin lispro (ADMELOG) corrective regimen sliding scale   SubCutaneous at bedtime  memantine 10 milliGRAM(s) Oral two times a day  sodium chloride 0.9%. 1000 milliLiter(s) (75 mL/Hr) IV Continuous <Continuous>    MEDICATIONS  (PRN):  acetaminophen     Tablet .. 650 milliGRAM(s) Oral every 6 hours PRN Temp greater or equal to 38C (100.4F), Mild Pain (1 - 3)  aluminum hydroxide/magnesium hydroxide/simethicone Suspension 30 milliLiter(s) Oral every 4 hours PRN Dyspepsia  benzonatate 100 milliGRAM(s) Oral every 8 hours PRN Cough  dextrose Oral Gel 15 Gram(s) Oral once PRN Blood Glucose LESS THAN 70 milliGRAM(s)/deciliter  guaiFENesin ER 1200 milliGRAM(s) Oral every 12 hours PRN Cough  melatonin 3 milliGRAM(s) Oral at bedtime PRN Insomnia  ondansetron Injectable 4 milliGRAM(s) IV Push every 8 hours PRN Nausea and/or Vomiting      Vital Signs Last 24 Hrs  T(C): 36.9 (22 Sep 2023 08:22), Max: 36.9 (21 Sep 2023 20:00)  T(F): 98.4 (22 Sep 2023 08:22), Max: 98.4 (21 Sep 2023 20:00)  HR: 71 (22 Sep 2023 08:22) (71 - 72)  BP: 142/58 (22 Sep 2023 08:22) (142/58 - 152/77)  RR: 18 (22 Sep 2023 08:22) (18 - 19)  SpO2: 93% (22 Sep 2023 08:22) (93% - 99%)    Parameters below as of 22 Sep 2023 08:22  Patient On (Oxygen Delivery Method): room air      PHYSICAL EXAM:  GENERAL: NAD, lying in bed comfortably  HEAD:  Atraumatic, Normocephalic  EYES: conjunctiva and sclera clear  ENT: Moist mucous membranes  NECK: Supple, No JVD  CHEST/LUNG: decreased breath sounds; No rales, rhonchi, wheezing. Unlabored respirations  HEART: Regular rate and rhythm; No murmurs  ABDOMEN: Bowel sounds present; Soft, Nontender, Nondistended.   EXTREMITIES:  2+ Peripheral Pulses, brisk capillary refill. No clubbing, cyanosis, or edema  NERVOUS SYSTEM:  Alert & Oriented X3, speech clear. No deficits   MSK: FROM all 4 extremities, full and equal strength        LABS:                          11.3   8.17  )-----------( 164      ( 22 Sep 2023 07:01 )             33.5     22 Sep 2023 07:01    141    |  113    |  20     ----------------------------<  125    4.0     |  22     |  0.99     Ca    8.8        22 Sep 2023 07:01  Mg     2.1       21 Sep 2023 06:58          CAPILLARY BLOOD GLUCOSE      POCT Blood Glucose.: 101 mg/dL (22 Sep 2023 16:37)  POCT Blood Glucose.: 116 mg/dL (22 Sep 2023 11:50)  POCT Blood Glucose.: 102 mg/dL (22 Sep 2023 08:07)  POCT Blood Glucose.: 141 mg/dL (21 Sep 2023 21:57)        Urinalysis Basic - ( 22 Sep 2023 07:01 )    Color: x / Appearance: x / SG: x / pH: x  Gluc: 125 mg/dL / Ketone: x  / Bili: x / Urobili: x   Blood: x / Protein: x / Nitrite: x   Leuk Esterase: x / RBC: x / WBC x   Sq Epi: x / Non Sq Epi: x / Bacteria: x        RADIOLOGY:  < from: Xray Chest 1 View- PORTABLE-Urgent (09.17.23 @ 22:34) >  AP chest on September 17, 2023 at 10:29 PM.    Heart magnified by technique.    There are scattered infiltrates mainly in the midlung fields of uncertain   etiology. These infiltrates are essentially new since May 13, 2020.    IMPRESSION: Scattered perihilar infiltrates at this time. No fracture.      < from: Xray Chest 1 View- PORTABLE-Urgent (Xray Chest 1 View- PORTABLE-Urgent .) (09.22.23 @ 14:11) >  INTERPRETATION:  AP chest on September 22, 2023 at 1:44 PM. Patient is   being followed for bilateral infiltrates.    Heart likely enlarged.      Upper lung field infiltrates are show roughly symmetric again noted.   There is somewhat increased from September 17.    IMPRESSION: Bilateral upper lobe infiltrates are increasing.             HPI: 88 year old female with PMH of dementia (A+Ox2 at baseline), HTN, Type 2 DM (not on medications currently, diet controlled), lower extremity swelling presented with weakness. As per pt's daughter, pt was doing well on day prior to admission, she returned home from dinner and a few hours later she found that her mother slid off the couch and was slumped onto the floor. She had decreased energy, chills, cough productive of mucus, swelling of her lower extremities which is chronic and abdominal pain. ER course: Temperature 103.1, heart rate 108-113, SPO2 93% on room air -> 93 to 95% on 3 L nasal cannula. Labs: Neutrophils 88% metamyelocytes 1% myelocytes 1%, potassium 3.3, creatinine 1.57 (baseline 0.8), glucose 287, lactate 3.5.  UA small blood, RBC 3-5, many bacteria.  COVID and RVP negative. Patient was given Zosyn, 2200 mL of normal saline, IV Tylenol, 40 mEq of PO potassium.     Subjective: Patient seen today, awake, alert, feels tired but better than yesterday, no overnight issues reported.     REVIEW OF SYSTEMS:    CONSTITUTIONAL: + weakness, fevers or chills  EYES/ENT: No visual changes;  No vertigo or throat pain   NECK: No pain or stiffness  RESPIRATORY: No cough, wheezing, hemoptysis; No shortness of breath  CARDIOVASCULAR: No chest pain or palpitations  GASTROINTESTINAL: No abdominal or epigastric pain. No nausea, vomiting, or hematemesis; No diarrhea or constipation. No melena or hematochezia.  GENITOURINARY: No dysuria, frequency or hematuria  NEUROLOGICAL: No numbness or weakness  SKIN: No itching, rashes        MEDICATIONS  (STANDING):  amLODIPine   Tablet 5 milliGRAM(s) Oral daily  cefTRIAXone Injectable. 2000 milliGRAM(s) IV Push every 24 hours  dextrose 5%. 1000 milliLiter(s) (50 mL/Hr) IV Continuous <Continuous>  dextrose 5%. 1000 milliLiter(s) (100 mL/Hr) IV Continuous <Continuous>  dextrose 50% Injectable 12.5 Gram(s) IV Push once  dextrose 50% Injectable 25 Gram(s) IV Push once  dextrose 50% Injectable 25 Gram(s) IV Push once  glucagon  Injectable 1 milliGRAM(s) IntraMuscular once  heparin   Injectable 5000 Unit(s) SubCutaneous every 12 hours  insulin glargine Injectable (LANTUS) 5 Unit(s) SubCutaneous at bedtime  insulin lispro (ADMELOG) corrective regimen sliding scale   SubCutaneous three times a day before meals  insulin lispro (ADMELOG) corrective regimen sliding scale   SubCutaneous at bedtime  memantine 10 milliGRAM(s) Oral two times a day  sodium chloride 0.9%. 1000 milliLiter(s) (75 mL/Hr) IV Continuous <Continuous>    MEDICATIONS  (PRN):  acetaminophen     Tablet .. 650 milliGRAM(s) Oral every 6 hours PRN Temp greater or equal to 38C (100.4F), Mild Pain (1 - 3)  aluminum hydroxide/magnesium hydroxide/simethicone Suspension 30 milliLiter(s) Oral every 4 hours PRN Dyspepsia  benzonatate 100 milliGRAM(s) Oral every 8 hours PRN Cough  dextrose Oral Gel 15 Gram(s) Oral once PRN Blood Glucose LESS THAN 70 milliGRAM(s)/deciliter  guaiFENesin ER 1200 milliGRAM(s) Oral every 12 hours PRN Cough  melatonin 3 milliGRAM(s) Oral at bedtime PRN Insomnia  ondansetron Injectable 4 milliGRAM(s) IV Push every 8 hours PRN Nausea and/or Vomiting      Vital Signs Last 24 Hrs  T(C): 36.9 (22 Sep 2023 08:22), Max: 36.9 (21 Sep 2023 20:00)  T(F): 98.4 (22 Sep 2023 08:22), Max: 98.4 (21 Sep 2023 20:00)  HR: 71 (22 Sep 2023 08:22) (71 - 72)  BP: 142/58 (22 Sep 2023 08:22) (142/58 - 152/77)  RR: 18 (22 Sep 2023 08:22) (18 - 19)  SpO2: 93% (22 Sep 2023 08:22) (93% - 99%)    Parameters below as of 22 Sep 2023 08:22  Patient On (Oxygen Delivery Method): room air      PHYSICAL EXAM:  GENERAL: NAD, but tired appearing   HEAD:  Atraumatic, Normocephalic  EYES: conjunctiva and sclera clear  ENT: Moist mucous membranes  NECK: Supple, No JVD  CHEST/LUNG: decreased breath sounds; No rales, rhonchi, wheezing. Unlabored respirations  HEART: Regular rate and rhythm; No murmurs  ABDOMEN: Bowel sounds present; Soft, Nontender, Nondistended.   EXTREMITIES:  2+ Peripheral Pulses, brisk capillary refill. No clubbing, cyanosis, or edema  NERVOUS SYSTEM:  Alert & Oriented X2, speech clear. No deficits   MSK: FROM all 4 extremities, full and equal strength        LABS:                          11.3   8.17  )-----------( 164      ( 22 Sep 2023 07:01 )             33.5     22 Sep 2023 07:01    141    |  113    |  20     ----------------------------<  125    4.0     |  22     |  0.99     Ca    8.8        22 Sep 2023 07:01  Mg     2.1       21 Sep 2023 06:58          CAPILLARY BLOOD GLUCOSE      POCT Blood Glucose.: 101 mg/dL (22 Sep 2023 16:37)  POCT Blood Glucose.: 116 mg/dL (22 Sep 2023 11:50)  POCT Blood Glucose.: 102 mg/dL (22 Sep 2023 08:07)  POCT Blood Glucose.: 141 mg/dL (21 Sep 2023 21:57)        Urinalysis Basic - ( 22 Sep 2023 07:01 )    Color: x / Appearance: x / SG: x / pH: x  Gluc: 125 mg/dL / Ketone: x  / Bili: x / Urobili: x   Blood: x / Protein: x / Nitrite: x   Leuk Esterase: x / RBC: x / WBC x   Sq Epi: x / Non Sq Epi: x / Bacteria: x        RADIOLOGY:  < from: Xray Chest 1 View- PORTABLE-Urgent (09.17.23 @ 22:34) >  AP chest on September 17, 2023 at 10:29 PM.    Heart magnified by technique.    There are scattered infiltrates mainly in the midlung fields of uncertain   etiology. These infiltrates are essentially new since May 13, 2020.    IMPRESSION: Scattered perihilar infiltrates at this time. No fracture.      < from: Xray Chest 1 View- PORTABLE-Urgent (Xray Chest 1 View- PORTABLE-Urgent .) (09.22.23 @ 14:11) >  INTERPRETATION:  AP chest on September 22, 2023 at 1:44 PM. Patient is   being followed for bilateral infiltrates.    Heart likely enlarged.      Upper lung field infiltrates are show roughly symmetric again noted.   There is somewhat increased from September 17.    IMPRESSION: Bilateral upper lobe infiltrates are increasing.

## 2023-09-22 NOTE — PROGRESS NOTE ADULT - SUBJECTIVE AND OBJECTIVE BOX
Subjective:   patient seen and examined, caregiver at the bedside. The patient is completely asymptomatic from pulmonary perspective.    Denies cough, expectoration, hemoptysis, pleuritic chest pain.    ROS:  Gen: Denies fever, chills, rigors  HEENT: Denies neck swelling, difficulty swallowing, nasal congestion  PULM: As above  Cardiology: Denies palpitation, dizziness, dyspnea        GENERAL APPEARANCE:  Pt. is not currently dyspneic, in no distress. Pt. is alert, oriented, and pleasant.  HEENT:   No icterus. Mucous membranes moist,   NECK:  Supple.  Jugular venous pressure not elevated.   HEART:    Regular. Normal S1 and S2. There are no murmurs, rubs or gallops appreciated  CHEST:  Chest is clear to auscultation. Normal respiratory effort. No rales, rhonchi or wheezes  EXTREMITIES:  There is no cyanosis, clubbing or edema.     Vital Signs Last 24 Hrs  T(C): 36.9 (22 Sep 2023 08:22), Max: 36.9 (21 Sep 2023 20:00)  T(F): 98.4 (22 Sep 2023 08:22), Max: 98.4 (21 Sep 2023 20:00)  HR: 71 (22 Sep 2023 08:22) (71 - 72)  BP: 142/58 (22 Sep 2023 08:22) (142/58 - 152/77)    RR: 18 (22 Sep 2023 08:22) (18 - 19)  SpO2: 93% (22 Sep 2023 08:22) (93% - 99%)    Parameters below as of 22 Sep 2023 08:22  Patient On (Oxygen Delivery Method): room air        MEDICATIONS  (STANDING):  amLODIPine   Tablet 5 milliGRAM(s) Oral daily  cefuroxime   Tablet 500 milliGRAM(s) Oral every 12 hours  dextrose 5%. 1000 milliLiter(s) (100 mL/Hr) IV Continuous <Continuous>  dextrose 5%. 1000 milliLiter(s) (50 mL/Hr) IV Continuous <Continuous>  dextrose 50% Injectable 25 Gram(s) IV Push once  dextrose 50% Injectable 12.5 Gram(s) IV Push once  dextrose 50% Injectable 25 Gram(s) IV Push once  glucagon  Injectable 1 milliGRAM(s) IntraMuscular once  heparin   Injectable 5000 Unit(s) SubCutaneous every 12 hours  insulin glargine Injectable (LANTUS) 5 Unit(s) SubCutaneous at bedtime  insulin lispro (ADMELOG) corrective regimen sliding scale   SubCutaneous three times a day before meals  insulin lispro (ADMELOG) corrective regimen sliding scale   SubCutaneous at bedtime  memantine 10 milliGRAM(s) Oral two times a day  sodium chloride 0.9%. 1000 milliLiter(s) (75 mL/Hr) IV Continuous <Continuous>    MEDICATIONS  (PRN):  acetaminophen     Tablet .. 650 milliGRAM(s) Oral every 6 hours PRN Temp greater or equal to 38C (100.4F), Mild Pain (1 - 3)  aluminum hydroxide/magnesium hydroxide/simethicone Suspension 30 milliLiter(s) Oral every 4 hours PRN Dyspepsia  benzonatate 100 milliGRAM(s) Oral every 8 hours PRN Cough  dextrose Oral Gel 15 Gram(s) Oral once PRN Blood Glucose LESS THAN 70 milliGRAM(s)/deciliter  guaiFENesin ER 1200 milliGRAM(s) Oral every 12 hours PRN Cough  melatonin 3 milliGRAM(s) Oral at bedtime PRN Insomnia  ondansetron Injectable 4 milliGRAM(s) IV Push every 8 hours PRN Nausea and/or Vomiting      Allergies    No Known Allergies    Intolerances      LABS:                        11.3   8.17  )-----------( 164      ( 22 Sep 2023 07:01 )             33.5     09-22    141  |  113<H>  |  20  ----------------------------<  125<H>  4.0   |  22  |  0.99    Ca    8.8      22 Sep 2023 07:01  Mg     2.1     09-21    RADIOLOGY & ADDITIONAL TESTS:    < from: Xray Chest 1 View- PORTABLE-Urgent (Xray Chest 1 View- PORTABLE-Urgent .) (09.22.23 @ 14:11) >    ACC: [id number] EXAM:  XR CHEST PORTABLE URGENT 1V   ORDERED BY: RICKEY VERA     PROCEDURE DATE:  09/22/2023          INTERPRETATION:  AP chest on September 22, 2023 at 1:44 PM. Patient is   being followed for bilateral infiltrates.    Heart likely enlarged.      Upper lung field infiltrates are show roughly symmetric again noted.   There is somewhat increased from September 17.    IMPRESSION: Bilateral upper lobe infiltrates are increasing.    --- End of Report ---            WILMER GIBSON MD;Attending Radiologist  This document has been electronically signed. Sep 22 2023  2:13PM    < end of copied text >

## 2023-09-22 NOTE — PROGRESS NOTE ADULT - ASSESSMENT
88-year-old female presented with weakness:     #Acute hypoxic respiratory distress and sepsis secondary to aspiration pneumonia  #Severe sepsis POA, with E coli UTI and bacteremia   #Lactic acidosis -  resolving 6 -> 3.5 --> 2.1 --> 0.6  #Leukocytosis -  trending down  #Bacteremia  - Blood cultures, Ucx + for E-COLI  - S/p IV Zosyn /  s/p Zithromax, on dc ID rec PO Ceftin 500mg BID complete total 14 day abx course  - Continue ceftriaxone 2g IV   - CT chest: Patchy ground-glass opacities in the left upper lobe are of uncertain etiology - discussed with Dr. Lloyd 9/22, repeat CXR with increasing b/l upper lobe infiltrates - recommend trial of diuresis, less likely infectious etiology, WBC is normal, pt afebrile   - ID Dr. Roberson following    #Abnormal CXR  - b/l upper lobe infiltrates  - continuous pulse ox - between % on room air   - As per RN dropped to 89% yesterday   - Trial lasix 20mg IV x 1  - patient is on lasix 20mg daily for LE edema - will restart  - monitor renal function and electrolytes closely     # Acute kidney injury - Improved   - Cr elv, trending down  (baseline 0.8), monitor closely   - s/p IVF in the ER, s/p gentle fluids, now tolerating PO hydration  - Avoid nephrotoxic medications   - Strict I+Os   - Monitor BMP    # Hypokalemia   - Resolved s/p repletion    # Hyperglycemia   - Hypoglycemia Protocol, LDISS, Accuchecks AC&HS.  - Diet: Consistent Carbs w/ Evening Snack.  - HbA1c: 7.9%  - Decrease lantus to 3u qhs, no sliding scale requirements  - Inpatient goal 140-180     # Microscopic hematuria   - UA small blood, RBC 3-5, many bacteria  - Recommend f/u UA in 4-6 weeks and if persistence of hematuria will need urology f/u     # History of dementia (A+Ox2 at baseline), HTN, Type 2 DM (not on medications currently, diet controlled), lower extremity swelling   - c/w home medications  - Metamyelocytes 1% myelocytes 1%, trend     # DVT ppx:   - Heparin 5,000 units Q12H (hold for PLT <50,000)     # Code status: DNR, trial of intubation okay. Palliative care eval appreciated    Emergency contact: Franci Wesley (daughter) [phone]       Discussed with daughter today, does not wish for pt to go to [facility], [facility] and PT will be in on Monday, requesting pt be d/c Sunday as patient is weak     discussed with       88-year-old female presented with weakness:     #Acute hypoxic respiratory distress and sepsis secondary to aspiration pneumonia  #Severe sepsis POA, with E coli UTI and bacteremia   #Lactic acidosis -  resolving 6 -> 3.5 --> 2.1 --> 0.6  #Leukocytosis -  trending down  #Bacteremia  - Blood cultures, Ucx + for E-COLI  - S/p IV Zosyn /  s/p Zithromax, on dc ID rec PO Ceftin 500mg BID complete total 14 day abx course  - Continue ceftriaxone 2g IV   - CT chest: Patchy ground-glass opacities in the left upper lobe are of uncertain etiology - discussed with Dr. Lloyd 9/22, repeat CXR with increasing b/l upper lobe infiltrates - recommend trial of diuresis, less likely infectious etiology, WBC is normal, pt afebrile   - ID Dr. Roberson following    #Abnormal CXR  - b/l upper lobe infiltrates  - continuous pulse ox - between % on room air   - As per RN dropped to 89% yesterday   - Trial lasix 20mg IV x 1  - patient is on lasix 20mg daily for LE edema - will restart  - monitor renal function and electrolytes closely     # Acute kidney injury - Improved   - Cr elv, trending down  (baseline 0.8), monitor closely   - s/p IVF in the ER, s/p gentle fluids, now tolerating PO hydration  - Avoid nephrotoxic medications   - Strict I+Os   - Monitor BMP    # Hypokalemia   - Resolved s/p repletion    # Hyperglycemia   - Hypoglycemia Protocol, LDISS, Accuchecks AC&HS.  - Diet: Consistent Carbs w/ Evening Snack.  - HbA1c: 7.9%  - Decrease lantus to 3u qhs, no sliding scale requirements  - Inpatient goal 140-180     #HTN  - DASH diet   - Increase amlodipine to 10mg with holding parameters     # Microscopic hematuria   - UA small blood, RBC 3-5, many bacteria  - Recommend f/u UA in 4-6 weeks and if persistence of hematuria will need urology f/u     # History of dementia (A+Ox2 at baseline), HTN, Type 2 DM (not on medications currently, diet controlled), lower extremity swelling   - c/w home medications  - Metamyelocytes 1% myelocytes 1%, trend     # DVT ppx:   - Heparin 5,000 units Q12H (hold for PLT <50,000)     # Code status: DNR, trial of intubation okay. Palliative care eval appreciated    Emergency contact: Franci Wesley (daughter) [phone]       Discussed with daughter today, does not wish for pt to go to [facility], [facility] and PT will be in on Monday, requesting pt be d/c Sunday as patient is weak     discussed with

## 2023-09-23 LAB
ANION GAP SERPL CALC-SCNC: 7 MMOL/L — SIGNIFICANT CHANGE UP (ref 5–17)
BUN SERPL-MCNC: 19 MG/DL — SIGNIFICANT CHANGE UP (ref 7–23)
CALCIUM SERPL-MCNC: 8.7 MG/DL — SIGNIFICANT CHANGE UP (ref 8.5–10.1)
CHLORIDE SERPL-SCNC: 109 MMOL/L — HIGH (ref 96–108)
CO2 SERPL-SCNC: 25 MMOL/L — SIGNIFICANT CHANGE UP (ref 22–31)
CREAT SERPL-MCNC: 1.09 MG/DL — SIGNIFICANT CHANGE UP (ref 0.5–1.3)
EGFR: 49 ML/MIN/1.73M2 — LOW
GLUCOSE BLDC GLUCOMTR-MCNC: 102 MG/DL — HIGH (ref 70–99)
GLUCOSE BLDC GLUCOMTR-MCNC: 135 MG/DL — HIGH (ref 70–99)
GLUCOSE BLDC GLUCOMTR-MCNC: 167 MG/DL — HIGH (ref 70–99)
GLUCOSE SERPL-MCNC: 81 MG/DL — SIGNIFICANT CHANGE UP (ref 70–99)
HCT VFR BLD CALC: 33.5 % — LOW (ref 34.5–45)
HGB BLD-MCNC: 11.3 G/DL — LOW (ref 11.5–15.5)
MAGNESIUM SERPL-MCNC: 1.9 MG/DL — SIGNIFICANT CHANGE UP (ref 1.6–2.6)
MCHC RBC-ENTMCNC: 29.3 PG — SIGNIFICANT CHANGE UP (ref 27–34)
MCHC RBC-ENTMCNC: 33.7 GM/DL — SIGNIFICANT CHANGE UP (ref 32–36)
MCV RBC AUTO: 86.8 FL — SIGNIFICANT CHANGE UP (ref 80–100)
PLATELET # BLD AUTO: 190 K/UL — SIGNIFICANT CHANGE UP (ref 150–400)
POTASSIUM SERPL-MCNC: 3.7 MMOL/L — SIGNIFICANT CHANGE UP (ref 3.5–5.3)
POTASSIUM SERPL-SCNC: 3.7 MMOL/L — SIGNIFICANT CHANGE UP (ref 3.5–5.3)
RBC # BLD: 3.86 M/UL — SIGNIFICANT CHANGE UP (ref 3.8–5.2)
RBC # FLD: 14.6 % — HIGH (ref 10.3–14.5)
SODIUM SERPL-SCNC: 141 MMOL/L — SIGNIFICANT CHANGE UP (ref 135–145)
WBC # BLD: 8.27 K/UL — SIGNIFICANT CHANGE UP (ref 3.8–10.5)
WBC # FLD AUTO: 8.27 K/UL — SIGNIFICANT CHANGE UP (ref 3.8–10.5)

## 2023-09-23 PROCEDURE — 99233 SBSQ HOSP IP/OBS HIGH 50: CPT

## 2023-09-23 RX ORDER — THIAMINE MONONITRATE (VIT B1) 100 MG
100 TABLET ORAL DAILY
Refills: 0 | Status: DISCONTINUED | OUTPATIENT
Start: 2023-09-23 | End: 2023-09-24

## 2023-09-23 RX ORDER — MULTIVIT-MIN/FERROUS GLUCONATE 9 MG/15 ML
1 LIQUID (ML) ORAL DAILY
Refills: 0 | Status: DISCONTINUED | OUTPATIENT
Start: 2023-09-23 | End: 2023-09-24

## 2023-09-23 RX ADMIN — HEPARIN SODIUM 5000 UNIT(S): 5000 INJECTION INTRAVENOUS; SUBCUTANEOUS at 11:05

## 2023-09-23 RX ADMIN — MEMANTINE HYDROCHLORIDE 10 MILLIGRAM(S): 10 TABLET ORAL at 21:42

## 2023-09-23 RX ADMIN — HEPARIN SODIUM 5000 UNIT(S): 5000 INJECTION INTRAVENOUS; SUBCUTANEOUS at 21:41

## 2023-09-23 RX ADMIN — CEFTRIAXONE 2000 MILLIGRAM(S): 500 INJECTION, POWDER, FOR SOLUTION INTRAMUSCULAR; INTRAVENOUS at 21:41

## 2023-09-23 RX ADMIN — Medication 1 TABLET(S): at 17:53

## 2023-09-23 RX ADMIN — INSULIN GLARGINE 3 UNIT(S): 100 INJECTION, SOLUTION SUBCUTANEOUS at 21:41

## 2023-09-23 RX ADMIN — Medication 100 MILLIGRAM(S): at 17:50

## 2023-09-23 RX ADMIN — MEMANTINE HYDROCHLORIDE 10 MILLIGRAM(S): 10 TABLET ORAL at 11:05

## 2023-09-23 RX ADMIN — AMLODIPINE BESYLATE 10 MILLIGRAM(S): 2.5 TABLET ORAL at 11:05

## 2023-09-23 RX ADMIN — Medication 20 MILLIGRAM(S): at 11:05

## 2023-09-23 NOTE — PROGRESS NOTE ADULT - ASSESSMENT
89 yo woman with DM type II, HTN, dementia (A+Ox2 at baseline), presented for further evaluation after she was noted to be slumping towards the floor, generally weak. As per patient's daughter, patient was doing well on day prior to admission, returned home from dinner and a few hours later she found that her mother slid off the couch and was slumped onto the floor. She had decreased energy, chills, cough, abdominal discomfort. In the ED she was febrile, tachycardic, mildly hypoxic. Labs were significant for LORY, hypokalemia, hyperglycemia, elevated lactate, abnormal urinalysis with bacteriuria. COVID and RVP PCR tests were negative. Patient was given Zosyn, 2200 mL of normal saline, IV Tylenol, 40 mEq of PO potassium.     Severe sepsis with organ dysfunction due to E.coli UTI, bacteremia, also unable to rule out pneumonia (see below), present upon admission  Clinically improved. Sepsis resolved. S/p IV Zosyn and azithromycin (for the PNA), now on ceftriaxone, recommended to transition to PO Ceftin 500mg BID to complete 14 days total course of antibiotics upon discharge.   - Continue antibiotics with ceftriaxone, plan to discharge on Ceftin 500mg BID to complete 14 day course    Acute respiratory failure with hypoxia, unable to rule out aspiration pneumonia, or bacterial pneumonia, or Gram-negative organism, present upon admission  Resolved. Completed antibiotic course for pneumonia. CT chest earlier this admission did show patchy ground-glass opacities in the left upper lobe of uncertain etiology and thus was unable to r/o pneumonia in the setting of severe sepsis and organ dysfunction and antibiotics were given as described. Had SLP evaluation this admission, showed no overt signs or symptoms of aspiration. Micro aspiration and/or silent aspiration cannot be ruled out. No oral-pharyngeal contraindication for regular texture diet with thin liquids. Patient did have recurrence of supplemental O2 need yesterday and had CXR performed, which demonstrated B/L infiltrates, suspect component of acute diastolic CHF. Patient was given empiric dose of IV Lasix and is now breathing comfortably on room air.   - Encourage incentive spirometry  - Encourage patient mobility  - Aspiration precautions    Acute kidney injury  Resolved. Likely pre-renal in setting of severe sepsis, dehydration. IV fluid resuscitation given and Cr now normalized.     Hypokalemia   In setting of sepsis and dehydration. Resolved with treatment of underlying issues along with potassium supplementation.     DM with hyperglycemia   A1c 7.9. Hyperglycemic upon admission, in setting of sepsis. Glycemic control now improved.   - Continue insulin regimen  - Will discuss with patient and family DM regimen for discharge    HTN  BP well controlled  - Continue with amlodipine, now at 10mg daily    Microscopic hematuria   UA small blood, RBC 3-5, many bacteria. Likely due to E.coli UTI, cystitis.   - Recommend f/u UA in 4-6 weeks and if persistence of hematuria will need Urology f/u     Dementia  Appears to be approaching baseline  - Continue memantine    Moderate colonic stool burden  As noted on CT A/P. No nausea or emesis. Passing gas.  - Continue bowel regimen    Moderate protein calorie malnutrition  Appreciate dietician input.  - Liberalize diet to regular  - Trend wt  - Added thiamine and MVI with minerals daily    Physical deconditioning and debility  PT evaluation appreciated. Recommend home with home PT vs [facility]. Patient/family prefer [facility].   - Continue restorative PT sessions  - OOB to chair daily      DVT px: Heparin subcut  Code status: DNR but intubate  Dispo: Anticipate DC home with home care tomorrow pending continued clinical improvement

## 2023-09-23 NOTE — PROGRESS NOTE ADULT - NUTRITIONAL ASSESSMENT
This patient has been assessed with a concern for Malnutrition and has been determined to have a diagnosis/diagnoses of Moderate protein-calorie malnutrition.    This patient is being managed with:   Diet DASH/TLC-  Sodium & Cholesterol Restricted  Entered: Sep 18 2023  2:16AM  

## 2023-09-23 NOTE — PROGRESS NOTE ADULT - SUBJECTIVE AND OBJECTIVE BOX
Chief Complaint: Generalized weakness, lethargy, confusion    Interval Hx: Patient seen and examined this AM. Patient resting comfortably, OOB in chair, awake and alert, answers simple questions, follows commands. No acute complaints. Denies fevers, chills, chest pain, dyspnea, cough, abdominal complaints, urinary complaints. She does remain generally weak but is gradually improving. Her appetite is diminished but also improving since admission. She is approaching stability for discharge at this point, possibly home with home care tomorrow.     ROS: Limited by patient's dementia but appears comprehensively negative x 10 systems except as above    Vitals:  T(F): 98.9 (22 Sep 2023 20:28), Max: 98.9 (22 Sep 2023 20:28)  HR: 74 (22 Sep 2023 20:28) (74 - 74)  BP: 144/60 (22 Sep 2023 20:28) (144/60 - 144/60)  RR: 18 (22 Sep 2023 20:28) (18 - 18)  SpO2: 95% (22 Sep 2023 20:28) (95% - 95%) on room air    Exam:  Gen: Resting comfortably in arm chair at bedside  HEENT: NCAT PERRL EOMI MMM clear oropharynx  Neck: Supple, no JVD, no LAD, no thyromegaly  Chest: Normal resp effort, lungs CTA B/L  CVS: s1 s2 normal RRR  Abd: +BS, soft NT ND   Ext: No edema or calf tenderness, normal cap refill, no clubbing  Skin: Warm, dry  Mood: Calm, pleasant  Neuro: Awake and alert, answers questions appropriately, follows simple commands, no gross focal deficits    Labs:                        11.3   8.27  )---------( 190                   33.5       141  |  109  |  19  ---------------------<  81  3.7   |  25  |  1.09    Ca  8.7     Mg 1.9      Lactate 3.5 --> 6.4 --> 0.6   troponin negative    proBNP 154    A1c 7.9    UA yellow, slightly turbid, N neg, LE neg, WBC 3-5 RBC 3-5 Bact +    Micro:  Urine culture panS E.coli  Blood culture panS E.coli    Imaging:  CXR 9/22: Heart likely enlarged. Upper lung field infiltrates are show roughly symmetric again noted. There is somewhat increased from September 17.    CT chest WO 9/18: Calcified lymph node is present in the pretracheal space. Few small lymph nodes are also noted in the AP window. Heart is enlarged in size. Calcification the coronary arteries is noted. No pericardial effusion is noted. No endobronchial lesions are noted. Minimal atelectasis is noted in the   posterior dependent aspect of the right lower lobe. Patchy groundglass opacities are present in the peripheral aspect of the left upper lobe. No   pleural effusions are noted. Below the diaphragm, visualized portions of the abdomen demonstrate residual contrast in the left renal collecting system. Degenerative changes of the spine are noted.    CT abd/pelvis W/ 9/17: Liver, bile ducts WNL. Cholelithiasis. Spleen WNL. Adrenals WNL. Pancreas mildly atrophic. Bilateral renal cysts and low-density lesions too small to characterize. Bilateral parapelvic cysts. Contrast within the urinary bladder. Uterus and adnexa within normal limits. 2.9 cm simple appearing left adnexal cyst, likely benign. No bowel obstruction. Moderate colonic stool burden. Colonic diverticulosis without diverticulitis. Appendix is normal. No ascites. Atherosclerotic changes. No lymphadenopathy. Normal abdominal wall. Degenerative changes.    CTA head and neck W/ 9/17: CTA head study with no flow-limiting stenosis, occlusion or aneurysm. Neck study with 50% narrowing right ICA origin. Otherwise mild atherosclerosis in the neck.    CT head WO 9/17: No apparent acute infarct, hemorrhage or mass. No hydrocephalus. Chronic appearing white matter hypodensities and volume loss. Basal ganglia mineralization bilaterally.    XR Pelvis 9/17:  Degenerative loss of disc height at L4-5 noted. Hips are rather free of degeneration. No fracture.     Cardiac Testing:  EKG 9/17: Rate 113. Sinus tachycardia with 1st degree A-V block. Biatrial enlargement. Nonspecific ST T changes.     Meds:  MEDICATIONS  (STANDING):  amLODIPine   Tablet 10 milliGRAM(s) Oral daily  cefTRIAXone Injectable. 2000 milliGRAM(s) IV Push every 24 hours  furosemide    Tablet 20 milliGRAM(s) Oral daily  heparin   Injectable 5000 Unit(s) SubCutaneous every 12 hours  insulin glargine Injectable (LANTUS) 3 Unit(s) SubCutaneous at bedtime  insulin lispro (ADMELOG) corrective regimen sliding scale   SubCutaneous three times a day before meals  insulin lispro (ADMELOG) corrective regimen sliding scale   SubCutaneous at bedtime  memantine 10 milliGRAM(s) Oral two times a day    MEDICATIONS  (PRN):  acetaminophen     Tablet .. 650 milliGRAM(s) Oral every 6 hours PRN Temp greater or equal to 38C (100.4F), Mild Pain (1 - 3)  aluminum hydroxide/magnesium hydroxide/simethicone Suspension 30 milliLiter(s) Oral every 4 hours PRN Dyspepsia  benzonatate 100 milliGRAM(s) Oral every 8 hours PRN Cough  dextrose Oral Gel 15 Gram(s) Oral once PRN Blood Glucose LESS THAN 70 milliGRAM(s)/deciliter  guaiFENesin ER 1200 milliGRAM(s) Oral every 12 hours PRN Cough  melatonin 3 milliGRAM(s) Oral at bedtime PRN Insomnia  ondansetron Injectable 4 milliGRAM(s) IV Push every 8 hours PRN Nausea and/or Vomiting  polyethylene glycol 3350 17 Gram(s) Oral daily PRN Constipation

## 2023-09-23 NOTE — PROGRESS NOTE ADULT - REASON FOR ADMISSION
Weakness
Severe sepsis with organ dysfunction due to E.coli UTI, bacteremia  Acute metabolic encephalopathy  LORY  Hypokalemia  DM with hyperglycemia
Weakness

## 2023-09-23 NOTE — PROGRESS NOTE ADULT - PROVIDER SPECIALTY LIST ADULT
Hospitalist
Infectious Disease
Pulmonology
Pulmonology
Infectious Disease
Palliative Care
Pulmonology
Hospitalist
Hospitalist
Palliative Care

## 2023-09-24 VITALS
SYSTOLIC BLOOD PRESSURE: 131 MMHG | HEART RATE: 95 BPM | TEMPERATURE: 99 F | OXYGEN SATURATION: 95 % | RESPIRATION RATE: 18 BRPM | DIASTOLIC BLOOD PRESSURE: 42 MMHG

## 2023-09-24 LAB
GLUCOSE BLDC GLUCOMTR-MCNC: 96 MG/DL — SIGNIFICANT CHANGE UP (ref 70–99)
GLUCOSE BLDC GLUCOMTR-MCNC: 96 MG/DL — SIGNIFICANT CHANGE UP (ref 70–99)

## 2023-09-24 PROCEDURE — 99239 HOSP IP/OBS DSCHRG MGMT >30: CPT

## 2023-09-24 RX ORDER — POLYETHYLENE GLYCOL 3350 17 G/17G
17 POWDER, FOR SOLUTION ORAL
Qty: 510 | Refills: 0
Start: 2023-09-24 | End: 2023-10-23

## 2023-09-24 RX ORDER — SENNA PLUS 8.6 MG/1
2 TABLET ORAL
Qty: 60 | Refills: 0
Start: 2023-09-24

## 2023-09-24 RX ORDER — AMLODIPINE BESYLATE 2.5 MG/1
1 TABLET ORAL
Qty: 0 | Refills: 0 | DISCHARGE

## 2023-09-24 RX ORDER — CEFUROXIME AXETIL 250 MG
1 TABLET ORAL
Qty: 16 | Refills: 0
Start: 2023-09-24 | End: 2023-10-01

## 2023-09-24 RX ORDER — THIAMINE MONONITRATE (VIT B1) 100 MG
1 TABLET ORAL
Qty: 0 | Refills: 0 | DISCHARGE
Start: 2023-09-24

## 2023-09-24 RX ORDER — CEFUROXIME AXETIL 250 MG
1 TABLET ORAL
Qty: 16 | Refills: 0 | DISCHARGE
Start: 2023-09-24 | End: 2023-10-01

## 2023-09-24 RX ORDER — ASPIRIN/CALCIUM CARB/MAGNESIUM 324 MG
1 TABLET ORAL
Qty: 0 | Refills: 0 | DISCHARGE

## 2023-09-24 RX ORDER — MULTIVIT-MIN/FERROUS GLUCONATE 9 MG/15 ML
1 LIQUID (ML) ORAL
Qty: 0 | Refills: 0 | DISCHARGE
Start: 2023-09-24

## 2023-09-24 RX ORDER — AMLODIPINE BESYLATE 2.5 MG/1
1 TABLET ORAL
Qty: 30 | Refills: 0
Start: 2023-09-24

## 2023-09-24 RX ORDER — HYDROCHLOROTHIAZIDE 25 MG
1 TABLET ORAL
Qty: 0 | Refills: 0 | DISCHARGE

## 2023-09-24 RX ADMIN — Medication 20 MILLIGRAM(S): at 09:40

## 2023-09-24 RX ADMIN — AMLODIPINE BESYLATE 10 MILLIGRAM(S): 2.5 TABLET ORAL at 09:40

## 2023-09-24 RX ADMIN — HEPARIN SODIUM 5000 UNIT(S): 5000 INJECTION INTRAVENOUS; SUBCUTANEOUS at 09:40

## 2023-09-24 RX ADMIN — Medication 100 MILLIGRAM(S): at 09:40

## 2023-09-24 RX ADMIN — MEMANTINE HYDROCHLORIDE 10 MILLIGRAM(S): 10 TABLET ORAL at 09:40

## 2023-09-24 RX ADMIN — Medication 1 TABLET(S): at 09:41

## 2023-09-24 NOTE — DISCHARGE NOTE PROVIDER - NSDCCPCAREPLAN_GEN_ALL_CORE_FT
PRINCIPAL DISCHARGE DIAGNOSIS  Diagnosis: Severe sepsis  Assessment and Plan of Treatment: Resolved. You were admitted to the hospital with weakness, malaise, fever, found to have severe sepsis due to urinary tract infection, blood stream infection, also unable to rule out pneumonia (see below), Reassuringl, you improved with intravenous antibiotics and intravneous fluid resuscitation. You are now doing well, stable for discharge to complete course of antibiotic orally, that is, with Ceftin 500mg twice a day through 10/1/23. Please follow up with your Primary Care care provider for routine post-discharge check-up.      SECONDARY DISCHARGE DIAGNOSES  Diagnosis: Acute respiratory failure with hypoxia  Assessment and Plan of Treatment: Resolved. You were also noted to have low oxygen levels upon admission. CT scan of the chest was notable for patchy ground-glass opacities in the left upper lobe of uncertain etiology and thus unable to rule out pneumonia in the setting of fever, lethargy, etc. Antibiotics were given as described above. You were seen by a Speech and Language Pathology specialist who found you to have no overt signs or symptoms of aspiration. No oral-pharyngeal contraindication for regular texture diet with thin liquids. You improved with antibiotics and other supportive care measures. However, a couple of days ago you were noted to have need to resume oxygen supplementation and chest xray showed increased congestion. You received a dose of intravenous diuretic and you are now breathing comfortably on room air. Recommend you continue to use incentive spirometry at home post discharge for a few days, continue to improve your mobility.    Diagnosis: LORY (acute kidney injury)  Assessment and Plan of Treatment: Resolved. Likely pre-renal in setting of severe sepsis, dehydration. IV fluid resuscitation given and Cr now normalized.       Diagnosis: Hypokalemia  Assessment and Plan of Treatment: Resolved. In setting of sepsis and dehydration, you had a low potassium level. Resolved with treatment of underlying issues along with potassium supplementation.    Diagnosis: Diabetes mellitus  Assessment and Plan of Treatment: A1c 7.9. Hyperglycemic upon admission, in setting of sepsis. Glycemic control now improved with sepsis improved. Avoid added sugars.    Diagnosis: HTN (hypertension)  Assessment and Plan of Treatment: Low blood pressure initially, in setting of severe infection. Blood pressure is now improved, normal. Continue with amlodipine, now at 10mg daily    Diagnosis: Microscopic hematuria  Assessment and Plan of Treatment: Urinalysis with microscopic amount of blood, likely due to cystitis (bladder inflammation) from the urinary infection.    Diagnosis: Dementia  Assessment and Plan of Treatment: Stable. Continue memantine.    Diagnosis: Constipation  Assessment and Plan of Treatment: Moderate colonic stool burden as noted on cat scan of abdomen and pelvis earlier this admission. No nausea or emesis. Passing gas. Continue bowel regimen.    Diagnosis: Malnutrition  Assessment and Plan of Treatment: Appreciate dietician input. Liberalized diet. Added thiamine and MVI with minerals daily. Trend wt    Diagnosis: Physical deconditioning  Assessment and Plan of Treatment: PT evaluation appreciated. Recommend home with home PT vs [facility]. You requested to return home. Continue restorative PT sessions. Out of bed to chair daily.

## 2023-09-24 NOTE — DISCHARGE NOTE PROVIDER - NSDCMRMEDTOKEN_GEN_ALL_CORE_FT
amLODIPine 10 mg oral tablet: 1 tab(s) orally once a day  cefuroxime 500 mg oral tablet: 1 tab(s) orally 2 times a day  Lasix 20 mg oral tablet: 1 tab(s) orally once a day  memantine 28 mg oral capsule, extended release: 1 tab(s) orally once a day  Multiple Vitamins with Minerals oral tablet: 1 tab(s) orally once a day  polyethylene glycol 3350 oral powder for reconstitution: 17 gram(s) orally once a day as needed for Constipation  Potassium Chloride (Eqv-Klor-Con 10) 10 mEq oral tablet, extended release: 1 tab(s) orally once a day  Senna 8.6 mg oral tablet: 2 tab(s) orally once a day (at bedtime)  thiamine 100 mg oral tablet: 1 tab(s) orally once a day

## 2023-09-24 NOTE — DISCHARGE NOTE PROVIDER - HOSPITAL COURSE
89 yo woman with DM type II, HTN, dementia (A+Ox2 at baseline), presented for further evaluation after she was noted to be slumping towards the floor, generally weak. As per patient's daughter, patient was doing well on day prior to admission, returned home from dinner and a few hours later she found that her mother slid off the couch and was slumped onto the floor. She had decreased energy, chills, cough, abdominal discomfort. In the ED she was febrile, tachycardic, mildly hypoxic. Labs were significant for LORY, hypokalemia, hyperglycemia, elevated lactate, abnormal urinalysis with bacteriuria. COVID and RVP PCR tests were negative. Patient was given Zosyn, 2200 mL of normal saline, IV Tylenol, 40 mEq of PO potassium.     Severe sepsis with organ dysfunction due to E.coli UTI, bacteremia, also unable to rule out pneumonia (see below), present upon admission  Clinically improved. Sepsis resolved. S/p IV Zosyn and azithromycin (for the PNA), now on ceftriaxone, to transition to PO Ceftin 500mg BID to complete 14 days total course of antibiotics upon discharge, that is, thru 10/1/23.     Acute respiratory failure with hypoxia, unable to rule out aspiration pneumonia, or bacterial pneumonia, or Gram-negative organism, present upon admission  Resolved. Completed antibiotic course for pneumonia. CT chest earlier this admission did show patchy ground-glass opacities in the left upper lobe of uncertain etiology and thus was unable to r/o pneumonia in the setting of severe sepsis and organ dysfunction and antibiotics were given as described. Had SLP evaluation this admission, showed no overt signs or symptoms of aspiration. Micro aspiration and/or silent aspiration cannot be ruled out. No oral-pharyngeal contraindication for regular texture diet with thin liquids. Patient did have recurrence of supplemental O2 need yesterday and had CXR performed, which demonstrated B/L infiltrates, suspect component of acute diastolic CHF. Patient was given empiric dose of IV Lasix and is now breathing comfortably on room air. Encourage incentive spirometry. Encourage patient mobility.    Acute kidney injury  Resolved. Likely pre-renal in setting of severe sepsis, dehydration. IV fluid resuscitation given and Cr now normalized.     Hypokalemia   In setting of sepsis and dehydration. Resolved with treatment of underlying issues along with potassium supplementation.     DM with hyperglycemia   A1c 7.9. Hyperglycemic upon admission, in setting of sepsis. Glycemic control now improved with sepsis improved. Avoid added sugars.     HTN  BP well controlled. Continue with amlodipine, now at 10mg daily    Microscopic hematuria   UA small blood, RBC 3-5, many bacteria. Likely due to E.coli UTI, cystitis.  Recommend f/u UA in 4-6 weeks and if persistence of hematuria will need Urology f/u     Dementia  Appears to be approaching baseline. Continue memantine    Moderate colonic stool burden  As noted on CT A/P earlier this admission. No nausea or emesis. Passing gas. Continue bowel regimen    Moderate protein calorie malnutrition  Appreciate dietician input. Liberalized diet. Added thiamine and MVI with minerals daily. Trend wt    Physical deconditioning and debility  PT evaluation appreciated. Recommend home with home PT vs [facility]. Patient/family prefer [facility].  Continue restorative PT sessions. OOB to chair daily

## 2023-09-24 NOTE — DISCHARGE NOTE PROVIDER - REASON FOR ADMISSION
Severe sepsis with organ dysfunction due to E.coli UTI, bacteremia  Acute metabolic encephalopathy  LORY  Hypokalemia  DM with hyperglycemia

## 2023-09-24 NOTE — DISCHARGE NOTE PROVIDER - DETAILS OF MALNUTRITION DIAGNOSIS/DIAGNOSES
This patient has been assessed with a concern for Malnutrition and was treated during this hospitalization for the following Nutrition diagnosis/diagnoses:     -  09/19/2023: Moderate protein-calorie malnutrition

## 2023-09-24 NOTE — DISCHARGE NOTE PROVIDER - NSDCPNSUBOBJ_GEN_ALL_CORE
Chief Complaint: Generalized weakness, lethargy, confusion    Interval Hx: Patient resting comfortably. Awake and alert, answers simple questions, follows commands. No acute complaints. Denies fevers, chills, chest pain, dyspnea, cough, abdominal complaints, urinary complaints. She does remain generally weak and deconditioned but this has been improving since admission. Her appetite is improving as well. Stable for discharge. PT recommended [facility] versus home with home PT, home assist. Patient and family wish for discharge to home.     ROS: Limited by patient's dementia but appears comprehensively negative x 10 systems except as above    Vitals:  T(F): 98.6 (24 Sep 2023 07:05), Max: 98.8 (23 Sep 2023 20:00)  HR: 95 (24 Sep 2023 07:05) (75 - 95)  BP: 131/42 (24 Sep 2023 07:05) (95/66 - 131/42)  RR: 18 (24 Sep 2023 07:05) (18 - 20)  SpO2: 95% (24 Sep 2023 07:05) (94% - 95%) on room air    Exam:  Gen: Resting comfortably in arm chair at bedside  HEENT: NCAT PERRL EOMI MMM clear oropharynx  Neck: Supple, no JVD, no LAD, no thyromegaly  Chest: Normal resp effort, lungs CTA B/L  CVS: s1 s2 normal RRR  Abd: +BS, soft NT ND   Ext: No edema or calf tenderness, normal cap refill, no clubbing  Skin: Warm, dry  Mood: Calm, pleasant  Neuro: Awake and alert, answers questions appropriately, follows simple commands, no gross focal deficits    Labs:                        11.3   8.27  )---------( 190                   33.5       141  |  109  |  19  ---------------------<  81  3.7   |  25  |  1.09    Ca  8.7     Mg 1.9      Lactate 3.5 --> 6.4 --> 0.6   troponin negative    proBNP 154    A1c 7.9    UA yellow, slightly turbid, N neg, LE neg, WBC 3-5 RBC 3-5 Bact +    Micro:  Urine culture panS E.coli  Blood culture panS E.coli    Imaging:  CXR 9/22: Heart likely enlarged. Upper lung field infiltrates are show roughly symmetric again noted. There is somewhat increased from September 17.    CT chest WO 9/18: Calcified lymph node is present in the pretracheal space. Few small lymph nodes are also noted in the AP window. Heart is enlarged in size. Calcification the coronary arteries is noted. No pericardial effusion is noted. No endobronchial lesions are noted. Minimal atelectasis is noted in the   posterior dependent aspect of the right lower lobe. Patchy groundglass opacities are present in the peripheral aspect of the left upper lobe. No   pleural effusions are noted. Below the diaphragm, visualized portions of the abdomen demonstrate residual contrast in the left renal collecting system. Degenerative changes of the spine are noted.    CT abd/pelvis W/ 9/17: Liver, bile ducts WNL. Cholelithiasis. Spleen WNL. Adrenals WNL. Pancreas mildly atrophic. Bilateral renal cysts and low-density lesions too small to characterize. Bilateral parapelvic cysts. Contrast within the urinary bladder. Uterus and adnexa within normal limits. 2.9 cm simple appearing left adnexal cyst, likely benign. No bowel obstruction. Moderate colonic stool burden. Colonic diverticulosis without diverticulitis. Appendix is normal. No ascites. Atherosclerotic changes. No lymphadenopathy. Normal abdominal wall. Degenerative changes.    CTA head and neck W/ 9/17: CTA head study with no flow-limiting stenosis, occlusion or aneurysm. Neck study with 50% narrowing right ICA origin. Otherwise mild atherosclerosis in the neck.    CT head WO 9/17: No apparent acute infarct, hemorrhage or mass. No hydrocephalus. Chronic appearing white matter hypodensities and volume loss. Basal ganglia mineralization bilaterally.    XR Pelvis 9/17:  Degenerative loss of disc height at L4-5 noted. Hips are rather free of degeneration. No fracture.     Cardiac Testing:  EKG 9/17: Rate 113. Sinus tachycardia with 1st degree A-V block. Biatrial enlargement. Nonspecific ST T changes.

## 2023-09-24 NOTE — DISCHARGE NOTE PROVIDER - NSDCCAREPROVSEEN_GEN_ALL_CORE_FT
VIA Anastrozole patient literature given and reviewed.  Prolia is authorized and admin schedule reviewed.  Anticipate starting both at next visit 6/26/19.   Janene Lloyd (Pulmonary Medicine)  Josselin Roberson (Infectious Disease)  Julieta Shannon (Palliative Medicine)  Manuel Torres (Medicine)  Alcira Saini (Medicine)  Aurora Rodriguez (Speech and Language Pathology)  Luca Santiago (Medicine)  Omar Gan (Medicine)

## 2023-09-24 NOTE — DISCHARGE NOTE PROVIDER - CARE PROVIDER_API CALL
Sky Sauer  Family Medicine  [address] [place] [address]  Phone: [phone]  Fax: [phone]  Follow Up Time: 2 weeks

## 2023-09-28 DIAGNOSIS — R00.0 TACHYCARDIA, UNSPECIFIED: ICD-10-CM

## 2023-09-28 DIAGNOSIS — R13.10 DYSPHAGIA, UNSPECIFIED: ICD-10-CM

## 2023-09-28 DIAGNOSIS — R65.20 SEVERE SEPSIS WITHOUT SEPTIC SHOCK: ICD-10-CM

## 2023-09-28 DIAGNOSIS — K59.00 CONSTIPATION, UNSPECIFIED: ICD-10-CM

## 2023-09-28 DIAGNOSIS — I50.31 ACUTE DIASTOLIC (CONGESTIVE) HEART FAILURE: ICD-10-CM

## 2023-09-28 DIAGNOSIS — E78.5 HYPERLIPIDEMIA, UNSPECIFIED: ICD-10-CM

## 2023-09-28 DIAGNOSIS — I44.0 ATRIOVENTRICULAR BLOCK, FIRST DEGREE: ICD-10-CM

## 2023-09-28 DIAGNOSIS — G93.41 METABOLIC ENCEPHALOPATHY: ICD-10-CM

## 2023-09-28 DIAGNOSIS — I11.0 HYPERTENSIVE HEART DISEASE WITH HEART FAILURE: ICD-10-CM

## 2023-09-28 DIAGNOSIS — E44.0 MODERATE PROTEIN-CALORIE MALNUTRITION: ICD-10-CM

## 2023-09-28 DIAGNOSIS — A41.51 SEPSIS DUE TO ESCHERICHIA COLI [E. COLI]: ICD-10-CM

## 2023-09-28 DIAGNOSIS — R31.29 OTHER MICROSCOPIC HEMATURIA: ICD-10-CM

## 2023-09-28 DIAGNOSIS — Z66 DO NOT RESUSCITATE: ICD-10-CM

## 2023-09-28 DIAGNOSIS — N17.9 ACUTE KIDNEY FAILURE, UNSPECIFIED: ICD-10-CM

## 2023-09-28 DIAGNOSIS — N39.0 URINARY TRACT INFECTION, SITE NOT SPECIFIED: ICD-10-CM

## 2023-09-28 DIAGNOSIS — E87.6 HYPOKALEMIA: ICD-10-CM

## 2023-09-28 DIAGNOSIS — E11.65 TYPE 2 DIABETES MELLITUS WITH HYPERGLYCEMIA: ICD-10-CM

## 2023-09-28 DIAGNOSIS — R05.9 COUGH, UNSPECIFIED: ICD-10-CM

## 2023-09-28 DIAGNOSIS — Z20.822 CONTACT WITH AND (SUSPECTED) EXPOSURE TO COVID-19: ICD-10-CM

## 2023-09-28 DIAGNOSIS — Z79.82 LONG TERM (CURRENT) USE OF ASPIRIN: ICD-10-CM

## 2023-09-28 DIAGNOSIS — J69.0 PNEUMONITIS DUE TO INHALATION OF FOOD AND VOMIT: ICD-10-CM

## 2023-09-28 DIAGNOSIS — J96.01 ACUTE RESPIRATORY FAILURE WITH HYPOXIA: ICD-10-CM

## 2023-09-28 DIAGNOSIS — F03.90 UNSPECIFIED DEMENTIA WITHOUT BEHAVIORAL DISTURBANCE: ICD-10-CM

## 2023-09-28 DIAGNOSIS — Z87.891 PERSONAL HISTORY OF NICOTINE DEPENDENCE: ICD-10-CM

## 2023-10-01 ENCOUNTER — INPATIENT (INPATIENT)
Facility: HOSPITAL | Age: 88
LOS: 1 days | Discharge: HOME HEALTH | DRG: 871 | End: 2023-10-03
Attending: FAMILY MEDICINE | Admitting: INTERNAL MEDICINE
Payer: MEDICARE

## 2023-10-01 VITALS
RESPIRATION RATE: 18 BRPM | WEIGHT: 139.99 LBS | HEIGHT: 64 IN | HEART RATE: 84 BPM | OXYGEN SATURATION: 95 % | TEMPERATURE: 99 F | SYSTOLIC BLOOD PRESSURE: 151 MMHG | DIASTOLIC BLOOD PRESSURE: 67 MMHG

## 2023-10-01 DIAGNOSIS — N39.0 URINARY TRACT INFECTION, SITE NOT SPECIFIED: ICD-10-CM

## 2023-10-01 DIAGNOSIS — Z96.659 PRESENCE OF UNSPECIFIED ARTIFICIAL KNEE JOINT: Chronic | ICD-10-CM

## 2023-10-01 PROBLEM — M79.89 OTHER SPECIFIED SOFT TISSUE DISORDERS: Chronic | Status: ACTIVE | Noted: 2023-09-18

## 2023-10-01 PROBLEM — E11.9 TYPE 2 DIABETES MELLITUS WITHOUT COMPLICATIONS: Chronic | Status: ACTIVE | Noted: 2023-09-18

## 2023-10-01 LAB
ALBUMIN SERPL ELPH-MCNC: 2.9 G/DL — LOW (ref 3.3–5)
ALP SERPL-CCNC: 125 U/L — HIGH (ref 40–120)
ALT FLD-CCNC: 24 U/L — SIGNIFICANT CHANGE UP (ref 12–78)
ANION GAP SERPL CALC-SCNC: 6 MMOL/L — SIGNIFICANT CHANGE UP (ref 5–17)
APPEARANCE UR: CLEAR — SIGNIFICANT CHANGE UP
APTT BLD: 23 SEC — LOW (ref 24.5–35.6)
AST SERPL-CCNC: 28 U/L — SIGNIFICANT CHANGE UP (ref 15–37)
BACTERIA # UR AUTO: ABNORMAL
BASOPHILS # BLD AUTO: 0.12 K/UL — SIGNIFICANT CHANGE UP (ref 0–0.2)
BASOPHILS NFR BLD AUTO: 1 % — SIGNIFICANT CHANGE UP (ref 0–2)
BILIRUB SERPL-MCNC: 0.9 MG/DL — SIGNIFICANT CHANGE UP (ref 0.2–1.2)
BILIRUB UR-MCNC: NEGATIVE — SIGNIFICANT CHANGE UP
BUN SERPL-MCNC: 24 MG/DL — HIGH (ref 7–23)
CALCIUM SERPL-MCNC: 9 MG/DL — SIGNIFICANT CHANGE UP (ref 8.5–10.1)
CHLORIDE SERPL-SCNC: 105 MMOL/L — SIGNIFICANT CHANGE UP (ref 96–108)
CO2 SERPL-SCNC: 28 MMOL/L — SIGNIFICANT CHANGE UP (ref 22–31)
COLOR SPEC: YELLOW — SIGNIFICANT CHANGE UP
CREAT SERPL-MCNC: 1.13 MG/DL — SIGNIFICANT CHANGE UP (ref 0.5–1.3)
DIFF PNL FLD: ABNORMAL
EGFR: 47 ML/MIN/1.73M2 — LOW
EOSINOPHIL # BLD AUTO: 0.11 K/UL — SIGNIFICANT CHANGE UP (ref 0–0.5)
EOSINOPHIL NFR BLD AUTO: 0.9 % — SIGNIFICANT CHANGE UP (ref 0–6)
EPI CELLS # UR: ABNORMAL
GLUCOSE SERPL-MCNC: 140 MG/DL — HIGH (ref 70–99)
GLUCOSE UR QL: NEGATIVE — SIGNIFICANT CHANGE UP
HCT VFR BLD CALC: 37.2 % — SIGNIFICANT CHANGE UP (ref 34.5–45)
HGB BLD-MCNC: 12.3 G/DL — SIGNIFICANT CHANGE UP (ref 11.5–15.5)
IMM GRANULOCYTES NFR BLD AUTO: 0.5 % — SIGNIFICANT CHANGE UP (ref 0–0.9)
INR BLD: 1.02 RATIO — SIGNIFICANT CHANGE UP (ref 0.85–1.18)
KETONES UR-MCNC: NEGATIVE — SIGNIFICANT CHANGE UP
LACTATE SERPL-SCNC: 1 MMOL/L — SIGNIFICANT CHANGE UP (ref 0.7–2)
LEUKOCYTE ESTERASE UR-ACNC: NEGATIVE — SIGNIFICANT CHANGE UP
LYMPHOCYTES # BLD AUTO: 1.23 K/UL — SIGNIFICANT CHANGE UP (ref 1–3.3)
LYMPHOCYTES # BLD AUTO: 10.3 % — LOW (ref 13–44)
MCHC RBC-ENTMCNC: 29.4 PG — SIGNIFICANT CHANGE UP (ref 27–34)
MCHC RBC-ENTMCNC: 33.1 GM/DL — SIGNIFICANT CHANGE UP (ref 32–36)
MCV RBC AUTO: 88.8 FL — SIGNIFICANT CHANGE UP (ref 80–100)
MONOCYTES # BLD AUTO: 0.78 K/UL — SIGNIFICANT CHANGE UP (ref 0–0.9)
MONOCYTES NFR BLD AUTO: 6.6 % — SIGNIFICANT CHANGE UP (ref 2–14)
NEUTROPHILS # BLD AUTO: 9.59 K/UL — HIGH (ref 1.8–7.4)
NEUTROPHILS NFR BLD AUTO: 80.7 % — HIGH (ref 43–77)
NITRITE UR-MCNC: NEGATIVE — SIGNIFICANT CHANGE UP
PH UR: 6.5 — SIGNIFICANT CHANGE UP (ref 5–8)
PLATELET # BLD AUTO: 382 K/UL — SIGNIFICANT CHANGE UP (ref 150–400)
POTASSIUM SERPL-MCNC: 4.8 MMOL/L — SIGNIFICANT CHANGE UP (ref 3.5–5.3)
POTASSIUM SERPL-SCNC: 4.8 MMOL/L — SIGNIFICANT CHANGE UP (ref 3.5–5.3)
PROT SERPL-MCNC: 7.5 GM/DL — SIGNIFICANT CHANGE UP (ref 6–8.3)
PROT UR-MCNC: 500 MG/DL
PROTHROM AB SERPL-ACNC: 11.5 SEC — SIGNIFICANT CHANGE UP (ref 9.5–13)
RAPID RVP RESULT: SIGNIFICANT CHANGE UP
RBC # BLD: 4.19 M/UL — SIGNIFICANT CHANGE UP (ref 3.8–5.2)
RBC # FLD: 14.2 % — SIGNIFICANT CHANGE UP (ref 10.3–14.5)
RBC CASTS # UR COMP ASSIST: ABNORMAL /HPF (ref 0–4)
SARS-COV-2 RNA SPEC QL NAA+PROBE: SIGNIFICANT CHANGE UP
SODIUM SERPL-SCNC: 139 MMOL/L — SIGNIFICANT CHANGE UP (ref 135–145)
SP GR SPEC: 1.01 — SIGNIFICANT CHANGE UP (ref 1.01–1.02)
UROBILINOGEN FLD QL: NEGATIVE — SIGNIFICANT CHANGE UP
WBC # BLD: 11.89 K/UL — HIGH (ref 3.8–10.5)
WBC # FLD AUTO: 11.89 K/UL — HIGH (ref 3.8–10.5)
WBC UR QL: SIGNIFICANT CHANGE UP /HPF (ref 0–5)

## 2023-10-01 PROCEDURE — 97530 THERAPEUTIC ACTIVITIES: CPT | Mod: GP

## 2023-10-01 PROCEDURE — 97162 PT EVAL MOD COMPLEX 30 MIN: CPT | Mod: GP

## 2023-10-01 PROCEDURE — 82962 GLUCOSE BLOOD TEST: CPT

## 2023-10-01 PROCEDURE — 84443 ASSAY THYROID STIM HORMONE: CPT

## 2023-10-01 PROCEDURE — 97116 GAIT TRAINING THERAPY: CPT | Mod: GP

## 2023-10-01 PROCEDURE — 36415 COLL VENOUS BLD VENIPUNCTURE: CPT

## 2023-10-01 PROCEDURE — 83880 ASSAY OF NATRIURETIC PEPTIDE: CPT

## 2023-10-01 PROCEDURE — 80048 BASIC METABOLIC PNL TOTAL CA: CPT

## 2023-10-01 PROCEDURE — 99285 EMERGENCY DEPT VISIT HI MDM: CPT

## 2023-10-01 PROCEDURE — 82607 VITAMIN B-12: CPT

## 2023-10-01 PROCEDURE — 82746 ASSAY OF FOLIC ACID SERUM: CPT

## 2023-10-01 PROCEDURE — 82140 ASSAY OF AMMONIA: CPT

## 2023-10-01 PROCEDURE — 71045 X-RAY EXAM CHEST 1 VIEW: CPT | Mod: 26

## 2023-10-01 PROCEDURE — 85027 COMPLETE CBC AUTOMATED: CPT

## 2023-10-01 PROCEDURE — 86780 TREPONEMA PALLIDUM: CPT

## 2023-10-01 RX ORDER — CEFTRIAXONE 500 MG/1
1000 INJECTION, POWDER, FOR SOLUTION INTRAMUSCULAR; INTRAVENOUS ONCE
Refills: 0 | Status: COMPLETED | OUTPATIENT
Start: 2023-10-01 | End: 2023-10-01

## 2023-10-01 RX ORDER — ACETAMINOPHEN 500 MG
1000 TABLET ORAL ONCE
Refills: 0 | Status: COMPLETED | OUTPATIENT
Start: 2023-10-01 | End: 2023-10-01

## 2023-10-01 RX ORDER — CEFTRIAXONE 500 MG/1
1000 INJECTION, POWDER, FOR SOLUTION INTRAMUSCULAR; INTRAVENOUS ONCE
Refills: 0 | Status: DISCONTINUED | OUTPATIENT
Start: 2023-10-01 | End: 2023-10-01

## 2023-10-01 RX ORDER — SODIUM CHLORIDE 9 MG/ML
1950 INJECTION INTRAMUSCULAR; INTRAVENOUS; SUBCUTANEOUS ONCE
Refills: 0 | Status: COMPLETED | OUTPATIENT
Start: 2023-10-01 | End: 2023-10-01

## 2023-10-01 RX ADMIN — SODIUM CHLORIDE 1950 MILLILITER(S): 9 INJECTION INTRAMUSCULAR; INTRAVENOUS; SUBCUTANEOUS at 12:24

## 2023-10-01 RX ADMIN — CEFTRIAXONE 1000 MILLIGRAM(S): 500 INJECTION, POWDER, FOR SOLUTION INTRAMUSCULAR; INTRAVENOUS at 16:33

## 2023-10-01 RX ADMIN — Medication 400 MILLIGRAM(S): at 12:23

## 2023-10-01 NOTE — ED PROVIDER NOTE - CLINICAL SUMMARY MEDICAL DECISION MAKING FREE TEXT BOX
DDx: known recent UTI, PNA, viral, r/o sepsis  Plan: sepsis work up, lactate, UA, CXR, viral pannel, and reassess

## 2023-10-01 NOTE — ED ADULT TRIAGE NOTE - CHIEF COMPLAINT QUOTE
Pt presents to ER c/o fever and weakness. Pt was discharged from  one week after staying for urosepsis. onset of symptoms began last night. Pt currently on Po abx

## 2023-10-01 NOTE — ED ADULT NURSE NOTE - OBJECTIVE STATEMENT
BIBEMS with private aide, alert, disoriented, febrile. presents with general malaise, hx of chronic UTI, urinary frequency. urosepsis, pna, no cough or congestion  Recent ER visit 2/2 urosepsis  Daughter at bedside

## 2023-10-01 NOTE — ED PROVIDER NOTE - COVID-19  TEST TYPE
Patient:   AUSTEN NGUYEN            MRN: [id number]            FIN: [id number]              Age:   22 years     Sex:  FEMALE     :  [birth year]   Associated Diagnoses:   None   Author:   SHANEKA ALEX     Discharge Summary  Final Diagnosis:   s/p   Reason for Admission:  39 4/7 wks in labor  Significant Findings:  none  Procedures Performed and Care, Treatment, and Services Provided:    Day of Surgery   No completed surgery documentation found on this encounter.   Delivery Information    Date/Time of Birth:                12-SEP-2020 23:31   Placenta Delivery Date/Time:  12-SEP-2020 23:35   Assistant Physician(s):        ASHLEY, JIGAR JOVEL, RAFY  Infant Data    Gender:                                 Female   Birth Weight:                         3040 gm    Apgar Score 1 Minute:            9   Apgar Score 5 Minute:            9  Condition on Discharge:  stable  Discharge Instructions:  f/u in 6 wks for PP visit, nothing in vagina until follow up.  Motrin for pain  Discharge Date:  20  
MOLECULAR PCR

## 2023-10-01 NOTE — ED CLERICAL - NS ED CLERK NOTE PRE-ARRIVAL INFORMATION; ADDITIONAL PRE-ARRIVAL INFORMATION
This patient is enrolled in the readmission reduction program and has active care navigation. This patient can be followed up by the care navigation team within 24 hours. To arrange close follow-up or to obtain additional clinical information about this patient, please call the contact number above. Please call the hospitalist as needed to collaborate on further medical management ([phone])

## 2023-10-01 NOTE — PATIENT PROFILE ADULT - FALL HARM RISK - HARM RISK INTERVENTIONS

## 2023-10-01 NOTE — ED PROVIDER NOTE - CPE EDP RESP NORM
Per the request of Dr. Rodrigues, called patient to schedule a clinic appointment to discuss surgery.  Explained to patient that Dr. Rodrigues and Dr. Grossman discussed the best plan for his polyp removal.  I explained that Dr. Grossman reviewed his records, and did not think he would be a successful candidate for EMR, and it was recommended he come back to clinic with Dr. Rodrigues to discuss surgical resection.  Patient verbalized understanding of this plan.  Patient is scheduled to see Dr. Rodrigues 08/22/17 at 10:30 am.  Patient has my direct number for additional questions or concerns.   
normal...

## 2023-10-01 NOTE — ED PROVIDER NOTE - OBJECTIVE STATEMENT
87 yo female with PMHx T2 DM, HTN, and dementia presents to the ED BIBEMS c/o fever and weakness. Pt was d/c from  1 week ago after being admitted for urosepsis. Daughter at bedside, reports a visiting nurse has been checking her regularly since d/c last week. Aide states the pt has been doing well up until this morning. Pt was coughing this morning and was too weak to stand up from the bed. Pt not bringing up any phlegm while coughing. Pt with 3.5 days left of Cefroxadine. Daughter reports she is nervous her bp will drop at home due to recent medication changes.

## 2023-10-01 NOTE — ED ADULT NURSE NOTE - NSFALLHARMRISKINTERV_ED_ALL_ED

## 2023-10-02 LAB
ANION GAP SERPL CALC-SCNC: 5 MMOL/L — SIGNIFICANT CHANGE UP (ref 5–17)
BUN SERPL-MCNC: 20 MG/DL — SIGNIFICANT CHANGE UP (ref 7–23)
CALCIUM SERPL-MCNC: 8.4 MG/DL — LOW (ref 8.5–10.1)
CHLORIDE SERPL-SCNC: 110 MMOL/L — HIGH (ref 96–108)
CO2 SERPL-SCNC: 24 MMOL/L — SIGNIFICANT CHANGE UP (ref 22–31)
CREAT SERPL-MCNC: 0.95 MG/DL — SIGNIFICANT CHANGE UP (ref 0.5–1.3)
CULTURE RESULTS: SIGNIFICANT CHANGE UP
EGFR: 58 ML/MIN/1.73M2 — LOW
GLUCOSE BLDC GLUCOMTR-MCNC: 104 MG/DL — HIGH (ref 70–99)
GLUCOSE BLDC GLUCOMTR-MCNC: 110 MG/DL — HIGH (ref 70–99)
GLUCOSE BLDC GLUCOMTR-MCNC: 96 MG/DL — SIGNIFICANT CHANGE UP (ref 70–99)
GLUCOSE BLDC GLUCOMTR-MCNC: 96 MG/DL — SIGNIFICANT CHANGE UP (ref 70–99)
GLUCOSE SERPL-MCNC: 98 MG/DL — SIGNIFICANT CHANGE UP (ref 70–99)
HCT VFR BLD CALC: 34 % — LOW (ref 34.5–45)
HGB BLD-MCNC: 11.4 G/DL — LOW (ref 11.5–15.5)
MCHC RBC-ENTMCNC: 29.3 PG — SIGNIFICANT CHANGE UP (ref 27–34)
MCHC RBC-ENTMCNC: 33.5 GM/DL — SIGNIFICANT CHANGE UP (ref 32–36)
MCV RBC AUTO: 87.4 FL — SIGNIFICANT CHANGE UP (ref 80–100)
PLATELET # BLD AUTO: 327 K/UL — SIGNIFICANT CHANGE UP (ref 150–400)
POTASSIUM SERPL-MCNC: 4.2 MMOL/L — SIGNIFICANT CHANGE UP (ref 3.5–5.3)
POTASSIUM SERPL-SCNC: 4.2 MMOL/L — SIGNIFICANT CHANGE UP (ref 3.5–5.3)
RBC # BLD: 3.89 M/UL — SIGNIFICANT CHANGE UP (ref 3.8–5.2)
RBC # FLD: 14.5 % — SIGNIFICANT CHANGE UP (ref 10.3–14.5)
SODIUM SERPL-SCNC: 139 MMOL/L — SIGNIFICANT CHANGE UP (ref 135–145)
SPECIMEN SOURCE: SIGNIFICANT CHANGE UP
WBC # BLD: 9.57 K/UL — SIGNIFICANT CHANGE UP (ref 3.8–10.5)
WBC # FLD AUTO: 9.57 K/UL — SIGNIFICANT CHANGE UP (ref 3.8–10.5)

## 2023-10-02 PROCEDURE — 99223 1ST HOSP IP/OBS HIGH 75: CPT

## 2023-10-02 PROCEDURE — 99497 ADVNCD CARE PLAN 30 MIN: CPT | Mod: 25

## 2023-10-02 RX ORDER — POTASSIUM CHLORIDE 20 MEQ
1 PACKET (EA) ORAL
Refills: 0 | DISCHARGE

## 2023-10-02 RX ORDER — MEMANTINE HYDROCHLORIDE 10 MG/1
1 TABLET ORAL
Refills: 0 | DISCHARGE

## 2023-10-02 RX ORDER — MULTIVIT-MIN/FERROUS GLUCONATE 9 MG/15 ML
1 LIQUID (ML) ORAL DAILY
Refills: 0 | Status: DISCONTINUED | OUTPATIENT
Start: 2023-10-02 | End: 2023-10-03

## 2023-10-02 RX ORDER — ONDANSETRON 8 MG/1
4 TABLET, FILM COATED ORAL EVERY 8 HOURS
Refills: 0 | Status: DISCONTINUED | OUTPATIENT
Start: 2023-10-02 | End: 2023-10-03

## 2023-10-02 RX ORDER — SODIUM CHLORIDE 9 MG/ML
1000 INJECTION, SOLUTION INTRAVENOUS
Refills: 0 | Status: DISCONTINUED | OUTPATIENT
Start: 2023-10-02 | End: 2023-10-03

## 2023-10-02 RX ORDER — ACETAMINOPHEN 500 MG
650 TABLET ORAL EVERY 6 HOURS
Refills: 0 | Status: DISCONTINUED | OUTPATIENT
Start: 2023-10-02 | End: 2023-10-03

## 2023-10-02 RX ORDER — DEXTROSE 50 % IN WATER 50 %
12.5 SYRINGE (ML) INTRAVENOUS ONCE
Refills: 0 | Status: DISCONTINUED | OUTPATIENT
Start: 2023-10-02 | End: 2023-10-03

## 2023-10-02 RX ORDER — MEMANTINE HYDROCHLORIDE 10 MG/1
10 TABLET ORAL
Refills: 0 | Status: DISCONTINUED | OUTPATIENT
Start: 2023-10-02 | End: 2023-10-03

## 2023-10-02 RX ORDER — LANOLIN ALCOHOL/MO/W.PET/CERES
3 CREAM (GRAM) TOPICAL AT BEDTIME
Refills: 0 | Status: DISCONTINUED | OUTPATIENT
Start: 2023-10-02 | End: 2023-10-03

## 2023-10-02 RX ORDER — GLUCAGON INJECTION, SOLUTION 0.5 MG/.1ML
1 INJECTION, SOLUTION SUBCUTANEOUS ONCE
Refills: 0 | Status: DISCONTINUED | OUTPATIENT
Start: 2023-10-02 | End: 2023-10-03

## 2023-10-02 RX ORDER — DEXTROSE 50 % IN WATER 50 %
25 SYRINGE (ML) INTRAVENOUS ONCE
Refills: 0 | Status: DISCONTINUED | OUTPATIENT
Start: 2023-10-02 | End: 2023-10-03

## 2023-10-02 RX ORDER — ENOXAPARIN SODIUM 100 MG/ML
40 INJECTION SUBCUTANEOUS EVERY 24 HOURS
Refills: 0 | Status: DISCONTINUED | OUTPATIENT
Start: 2023-10-02 | End: 2023-10-03

## 2023-10-02 RX ORDER — DEXTROSE 50 % IN WATER 50 %
15 SYRINGE (ML) INTRAVENOUS ONCE
Refills: 0 | Status: DISCONTINUED | OUTPATIENT
Start: 2023-10-02 | End: 2023-10-03

## 2023-10-02 RX ORDER — AMLODIPINE BESYLATE 2.5 MG/1
10 TABLET ORAL DAILY
Refills: 0 | Status: DISCONTINUED | OUTPATIENT
Start: 2023-10-02 | End: 2023-10-03

## 2023-10-02 RX ORDER — POTASSIUM CHLORIDE 20 MEQ
10 PACKET (EA) ORAL DAILY
Refills: 0 | Status: DISCONTINUED | OUTPATIENT
Start: 2023-10-02 | End: 2023-10-03

## 2023-10-02 RX ORDER — CEFUROXIME AXETIL 250 MG
500 TABLET ORAL EVERY 12 HOURS
Refills: 0 | Status: DISCONTINUED | OUTPATIENT
Start: 2023-10-02 | End: 2023-10-03

## 2023-10-02 RX ORDER — INSULIN LISPRO 100/ML
VIAL (ML) SUBCUTANEOUS
Refills: 0 | Status: DISCONTINUED | OUTPATIENT
Start: 2023-10-02 | End: 2023-10-03

## 2023-10-02 RX ORDER — FUROSEMIDE 40 MG
20 TABLET ORAL DAILY
Refills: 0 | Status: DISCONTINUED | OUTPATIENT
Start: 2023-10-02 | End: 2023-10-03

## 2023-10-02 RX ADMIN — AMLODIPINE BESYLATE 10 MILLIGRAM(S): 2.5 TABLET ORAL at 14:21

## 2023-10-02 RX ADMIN — Medication 20 MILLIGRAM(S): at 14:21

## 2023-10-02 RX ADMIN — Medication 500 MILLIGRAM(S): at 14:21

## 2023-10-02 RX ADMIN — Medication 10 MILLIEQUIVALENT(S): at 14:24

## 2023-10-02 RX ADMIN — MEMANTINE HYDROCHLORIDE 10 MILLIGRAM(S): 10 TABLET ORAL at 21:15

## 2023-10-02 RX ADMIN — ENOXAPARIN SODIUM 40 MILLIGRAM(S): 100 INJECTION SUBCUTANEOUS at 14:21

## 2023-10-02 RX ADMIN — Medication 500 MILLIGRAM(S): at 21:15

## 2023-10-02 NOTE — PHYSICAL THERAPY INITIAL EVALUATION ADULT - PERTINENT HX OF CURRENT PROBLEM, REHAB EVAL
89 yo female with PMHx T2 DM, HTN, and dementia presents to the ED BIBEMS c/o fever and weakness. Pt was d/c from  1 week ago after being admitted for urosepsis. Daughter at bedside, reports a visiting nurse has been checking her regularly since d/c last week. Aide states the pt has been doing well up until this morning. Pt was coughing this morning and was too weak to stand up from the bed. Pt not bringing up any phlegm while coughing. Pt with 3.5 days left of Cefroxadine. Daughter reports she is nervous her bp will drop at home due to recent medication changes.

## 2023-10-02 NOTE — H&P ADULT - NSHPLABSRESULTS_GEN_ALL_CORE
11.4   9.57  )-----------( 327      ( 02 Oct 2023 09:04 )             34.0     10-02    139  |  110<H>  |  20  ----------------------------<  98  4.2   |  24  |  0.95    Ca    8.4<L>      02 Oct 2023 09:04    TPro  7.5  /  Alb  2.9<L>  /  TBili  0.9  /  DBili  x   /  AST  28  /  ALT  24  /  AlkPhos  125<H>  10-01    CAPILLARY BLOOD GLUCOSE      POCT Blood Glucose.: 96 mg/dL (02 Oct 2023 07:52)    PT/INR - ( 01 Oct 2023 11:55 )   PT: 11.5 sec;   INR: 1.02 ratio       PTT - ( 01 Oct 2023 11:55 )  PTT:23.0 sec    Urinalysis Basic - ( 02 Oct 2023 09:04 )  Color: x / Appearance: x / SG: x / pH: x  Gluc: 98 mg/dL / Ketone: x  / Bili: x / Urobili: x   Blood: x / Protein: x / Nitrite: x   Leuk Esterase: x / RBC: x / WBC x   Sq Epi: x / Non Sq Epi: x / Bacteria: x

## 2023-10-02 NOTE — PHARMACOTHERAPY INTERVENTION NOTE - COMMENTS
Medication history complete, reviewed medication with patients daughter Franci tat [phone] and confirmed with DrFirst.

## 2023-10-02 NOTE — H&P ADULT - HISTORY OF PRESENT ILLNESS
89 y/o female with PMHx of HTN, DM, Dementia,       PAST MEDICAL & SURGICAL HISTORY:  Dementia  DM (diabetes mellitus)  HTN (hypertension)  Type 2 diabetes mellitus  Swelling of lower extremity  S/P TKR (total knee replacement)      FAMILY HISTORY:  FH: heart disease (Father)      Social History:    No smoking/ ETOH use.        Allergies:  No Known Allergies       87 y/o female with PMHx of HTN, DM, Dementia and recent admit for urosepsis with e.coli bacteremia who presented to  with CC of weakness and fever.  History is obtained from chart and from patient's daughter.  As per daughter, patient had been doing well since discharge.  Patient was getting visiting nurse/ PT few days a week.  Patient was getting OOB and doing exercises.  Patient has been taking her antibiotics as per daughter.  Yesterday morning, patient seemed more tired.  She didn't want to get OOB.  When daughter returned home, she found mother had a low grade temp 100.5 and was too weak to get OOB.  She tried to get her OOB with help of her aide and patient was increasingly weak.  They called [facility] who sent an ambulance to bring her to the ER.  In the ER, patient had temp of 101.3.  WBC of 11.  Pancultures and given rocephin.        PAST MEDICAL & SURGICAL HISTORY:  Dementia  DM (diabetes mellitus)  HTN (hypertension)  Type 2 diabetes mellitus  Swelling of lower extremity  S/P TKR (total knee replacement)      FAMILY HISTORY:  FH: heart disease (Father)      Social History:    No smoking/ ETOH use.        Allergies:  No Known Allergies

## 2023-10-02 NOTE — PHYSICAL THERAPY INITIAL EVALUATION ADULT - IMPAIRMENTS CONTRIBUTING IMPAIRED BED MOBILITY, REHAB EVAL
"PTA: PTA     PTA Visit Number: 2       Heike Hudson PTA.  Pager [phone]    12/11/2019    .       decreased strength

## 2023-10-02 NOTE — H&P ADULT - NSHPPHYSICALEXAM_GEN_ALL_CORE
PEx  T(C): 37.1 (10-02-23 @ 08:19), Max: 38.2 (10-01-23 @ 22:15)  HR: 73 (10-02-23 @ 08:19) (73 - 93)  BP: 155/55 (10-02-23 @ 08:19) (119/71 - 156/67)  RR: 18 (10-02-23 @ 08:19) (18 - 21)  SpO2: 99% (10-02-23 @ 08:19) (94% - 100%)  Wt(kg): --  General:     Well appearing, well nourished in no distress, no identifying marks , scars, or tattoos.  Skin: no rash or prominent lesions  Head: normocephalic, atraumatic     Sinuses: non-tender  Nose: no external lesions, mucosa non-inflamed, septum and turbinates normal  Throat: no erythema, exudates or lesions.  Neck: Supple without lymphadenopathy. Thyroid no thyromegaly, no palpable thyroid nodules, no palpable nodules or masses, carotid arteries no bruits.   Breasts: No palpable masses or lesions.  Heart: RRR, no murmur or gallop.  Normal S1, S2.  No S3, S4.   Lungs: CTA bilaterally, no wheezes, rhonchi, rales.  Breathing unlabored.   Chest wall: Normal insp   Abdomen:  Soft, NT/ND, normal bowel sounds, no HSM, no masses.  No peritoneal signs.   Back: spine normal without deformity or tenderness.  Normal ROM   : Exam normal.  no inguinal hernias.  Extremities: No deformities, clubbing, cyanosis, or edema.  Musculoskeletal: Normal gait and station. No decreased range of motion, instability, atrophy or abnormal strength or tone in the head, neck, spine, ribs, pelvis or extremities.   Neurologic: CN 2-12 normal. Sensation to pain, touch and proprioception normal. DTRs normal in upper and lower extremities. No pathologic reflexes.  Motor normal.  Psychiatric: Oriented X3, intact recent and remote memory, judgement and insight, normal mood and affect. PEx  T(C): 37.1 (10-02-23 @ 08:19), Max: 38.2 (10-01-23 @ 22:15)  HR: 73 (10-02-23 @ 08:19) (73 - 93)  BP: 155/55 (10-02-23 @ 08:19) (119/71 - 156/67)  RR: 18 (10-02-23 @ 08:19) (18 - 21)  SpO2: 99% (10-02-23 @ 08:19) (94% - 100%)    General: lying in bed.  NAD>    Skin: no rash or prominent lesions  Head: normocephalic, atraumatic     Sinuses: non-tender  Nose: no external lesions, mucosa non-inflamed, septum and turbinates normal  Throat: no erythema, exudates or lesions.  Neck: Supple without lymphadenopathy. Thyroid no thyromegaly, no palpable thyroid nodules, no palpable nodules or masses, carotid arteries no bruits.   Breasts: No palpable masses or lesions.  Heart: RRR, no murmur or gallop.  Normal S1, S2.  No S3, S4.   Lungs: CTA bilaterally, no wheezes, rhonchi, rales.  Breathing unlabored.   Chest wall: Normal insp   Abdomen:  Soft, NT/ND, normal bowel sounds, no HSM, no masses.  No peritoneal signs.   Back: spine normal without deformity or tenderness.  Normal ROM   : Exam normal.  no inguinal hernias.  Extremities: No deformities, clubbing, cyanosis, or edema.  Musculoskeletal: Normal gait and station. No decreased range of motion, instability, atrophy or abnormal strength or tone in the head, neck, spine, ribs, pelvis or extremities.   Neurologic: CN 2-12 normal. Sensation to pain, touch and proprioception normal. DTRs normal in upper and lower extremities. No pathologic reflexes.  Motor normal.  Psychiatric: awake and alert. NAD

## 2023-10-02 NOTE — H&P ADULT - NSHPREVIEWOFSYSTEMS_GEN_ALL_CORE
ROS:  General:  No fevers, chills, or unexplained weight loss  Skin: No rash or bothersome skin lesions  Musculoskeletal: No arthalgias, myalgias or joint swelling  Eyes: No visual changes or eye pain  Ears: No hearing loss , otorrhea or ear pain  Nose, Mouth, Throat: No nasal congestion, rhinorrhea, oral lesions, postnasal drip or sore throat  Cardio: No chest pain or palpitations. no lower extremity edema. no syncope. no claudication.   Respiratory: No cough, shortness of breath or wheezing   GI: No diarrhea, constipation, blood in stools, abdominal pain, vomiting or heartburn  : No urinary frequency, hematuria, incontinence, or dysuria  Neurologic: No headaches, parasthesias, confusion, dysarthria or gait instability  Psychiatric:  No anxiety or depression  Lymphatic:  No easy bruising, easy bleeding or swollen glands  Allergic: No itching, sneezing , watery eyes, clear rhinorrhea or recurrent infections ROS:  General:  fever  Skin: No rash or bothersome skin lesions  Musculoskeletal: weakness  Eyes: No visual changes or eye pain  Ears: No hearing loss , otorrhea or ear pain  Nose, Mouth, Throat: No nasal congestion, rhinorrhea, oral lesions, postnasal drip or sore throat  Cardio: No chest pain or palpitations. no lower extremity edema. no syncope. no claudication.   Respiratory: No cough, shortness of breath or wheezing   GI: No diarrhea, constipation, blood in stools, abdominal pain, vomiting or heartburn  : No urinary frequency, hematuria, incontinence, or dysuria  Neurologic: No headaches, parasthesias, confusion, dysarthria or gait instability  Psychiatric:  No anxiety or depression  Lymphatic:  No easy bruising, easy bleeding or swollen glands  Allergic: No itching, sneezing , watery eyes, clear rhinorrhea or recurrent infections

## 2023-10-02 NOTE — H&P ADULT - ASSESSMENT
89 y/o female with PMHx of HTN, DM, Dementia and recent admit for urosepsis with e.coli bacteremia who presented to  with CC of weakness and fever.  In the ER, patient had temp of 100.7.  WBC of 11.  Pancultures and given rocephin.        #Fever 101:    F/u pancultures.    UA on admit overall negative.    CXR negative.    F/u blood cultures-- hx of bacteremia last month.    T/c further imaging if fevers persist.    RVP/ covid negative.    Afebrile today 10/2.      #Recent admit for urosepsis with e.coli bacteremia:    Cont home ceftin.    As per notes, should be done 10/1 but daughter states still had 3 more days left.    S/p rocephin in ER on 10/1.    Will cont ceftin for 2 more days.    UA appears negative.      #Metabolic encephalopathy:    Suspect related to fever.    Check other metabolic labs.    Reorient.      #Weakness:    PT Eval.    Family refused rehab last admit due to dementia.      #Dementia:    Cont home namenda.      #HTN:    Cont home norvasc.      #DM:    BGMs last admit 100-150.    Check BGMs.      #DVT proph:  Lovenox.

## 2023-10-03 VITALS
DIASTOLIC BLOOD PRESSURE: 54 MMHG | OXYGEN SATURATION: 92 % | HEART RATE: 75 BPM | RESPIRATION RATE: 18 BRPM | SYSTOLIC BLOOD PRESSURE: 130 MMHG | TEMPERATURE: 99 F

## 2023-10-03 LAB
ADD ON TEST-SPECIMEN IN LAB: SIGNIFICANT CHANGE UP
AMMONIA BLD-MCNC: 10 UMOL/L — LOW (ref 11–32)
FOLATE SERPL-MCNC: >20 NG/ML — SIGNIFICANT CHANGE UP
GLUCOSE BLDC GLUCOMTR-MCNC: 106 MG/DL — HIGH (ref 70–99)
GLUCOSE BLDC GLUCOMTR-MCNC: 117 MG/DL — HIGH (ref 70–99)
GLUCOSE BLDC GLUCOMTR-MCNC: 92 MG/DL — SIGNIFICANT CHANGE UP (ref 70–99)
NT-PROBNP SERPL-SCNC: 576 PG/ML — HIGH (ref 0–450)
T PALLIDUM AB TITR SER: NEGATIVE — SIGNIFICANT CHANGE UP
TSH SERPL-MCNC: 1.83 UU/ML — SIGNIFICANT CHANGE UP (ref 0.34–4.82)
VIT B12 SERPL-MCNC: 1063 PG/ML — SIGNIFICANT CHANGE UP (ref 232–1245)

## 2023-10-03 PROCEDURE — 99239 HOSP IP/OBS DSCHRG MGMT >30: CPT

## 2023-10-03 RX ORDER — AMLODIPINE BESYLATE 2.5 MG/1
1 TABLET ORAL
Qty: 30 | Refills: 0
Start: 2023-10-03 | End: 2023-11-01

## 2023-10-03 RX ORDER — LACTOBACILLUS ACIDOPHILUS 100MM CELL
1 CAPSULE ORAL
Qty: 60 | Refills: 0
Start: 2023-10-03 | End: 2023-11-01

## 2023-10-03 RX ORDER — LACTOBACILLUS ACIDOPHILUS 100MM CELL
1 CAPSULE ORAL
Refills: 0 | Status: DISCONTINUED | OUTPATIENT
Start: 2023-10-03 | End: 2023-10-03

## 2023-10-03 RX ORDER — ACETAMINOPHEN 500 MG
2 TABLET ORAL
Qty: 0 | Refills: 0 | DISCHARGE
Start: 2023-10-03

## 2023-10-03 RX ORDER — POTASSIUM CHLORIDE 20 MEQ
1 PACKET (EA) ORAL
Refills: 0 | DISCHARGE

## 2023-10-03 RX ORDER — AMLODIPINE BESYLATE 2.5 MG/1
5 TABLET ORAL DAILY
Refills: 0 | Status: DISCONTINUED | OUTPATIENT
Start: 2023-10-03 | End: 2023-10-03

## 2023-10-03 RX ORDER — FUROSEMIDE 40 MG
1 TABLET ORAL
Refills: 0 | DISCHARGE

## 2023-10-03 RX ORDER — HYDROCHLOROTHIAZIDE 25 MG
1 TABLET ORAL
Refills: 0 | DISCHARGE

## 2023-10-03 RX ORDER — SODIUM CHLORIDE 9 MG/ML
250 INJECTION INTRAMUSCULAR; INTRAVENOUS; SUBCUTANEOUS ONCE
Refills: 0 | Status: COMPLETED | OUTPATIENT
Start: 2023-10-03 | End: 2023-10-03

## 2023-10-03 RX ADMIN — ENOXAPARIN SODIUM 40 MILLIGRAM(S): 100 INJECTION SUBCUTANEOUS at 13:39

## 2023-10-03 RX ADMIN — MEMANTINE HYDROCHLORIDE 10 MILLIGRAM(S): 10 TABLET ORAL at 11:19

## 2023-10-03 RX ADMIN — Medication 500 MILLIGRAM(S): at 11:18

## 2023-10-03 RX ADMIN — SODIUM CHLORIDE 500 MILLILITER(S): 9 INJECTION INTRAMUSCULAR; INTRAVENOUS; SUBCUTANEOUS at 11:18

## 2023-10-03 RX ADMIN — Medication 1 TABLET(S): at 17:59

## 2023-10-03 RX ADMIN — Medication 1 TABLET(S): at 11:19

## 2023-10-03 RX ADMIN — AMLODIPINE BESYLATE 5 MILLIGRAM(S): 2.5 TABLET ORAL at 11:19

## 2023-10-03 NOTE — DISCHARGE NOTE PROVIDER - NSDCCPCAREPLAN_GEN_ALL_CORE_FT
PRINCIPAL DISCHARGE DIAGNOSIS  Diagnosis: Urinary tract infection with fever  Assessment and Plan of Treatment: complete antibiotics for 1 more day  return to Ed if fever chills abdominal pain, other concerns  follow up with PCP Dyllan 1 week for further care      SECONDARY DISCHARGE DIAGNOSES  Diagnosis: Orthostatic hypotension  Assessment and Plan of Treatment: drink plenty of fluids  stop taking lasix, HCTZ   lower dose of amlodipie 5 mg   follow up with PCP for further care    Diagnosis: Adnexal cyst  Assessment and Plan of Treatment: on prior CT abd - there is 2.9 cm left adnexal cyst, bilateral renal cyst   follow up with PCP for further monitoring    Diagnosis: Atelectasis of both lungs  Assessment and Plan of Treatment: use incentive spirometry for breathing exercises    Diagnosis: Opacity of lung on imaging study  Assessment and Plan of Treatment: seen on CT chets in 9/18/23   no evidence of pneumonia on CXR 10/1./23   -follow up with PCP fr further monitoring    Diagnosis: Diabetes mellitus  Assessment and Plan of Treatment: diet controlled, follow up for preventive care with Dr. Sauer

## 2023-10-03 NOTE — DISCHARGE NOTE PROVIDER - HOSPITAL COURSE
cc - weakness        89 y/o female with PMHx of HTN, DM2, Dementia and recent admit for urosepsis with e.coli bacteremia who presented to  on 10/1/23  with CC of weakness and fever.  History is obtained from chart and from patient's daughter.  As per daughter, patient had been doing well since discharge.  Patient was getting visiting nurse/ PT few days a week.  Patient was getting OOB and doing exercises.  Patient has been taking her antibiotics as per daughter.  Yesterday morning, patient seemed more tired.  She didn't want to get OOB.  When daughter returned home, she found mother had a low grade temp 100.5 and was too weak to get OOB.  She tried to get her OOB with help of her aide and patient was increasingly weak.  They called [facility] who sent an ambulance to bring her to the ER.  In the ER, patient had temp of 101.3.  WBC of 11.  Pancultures and given rocephin.    10/3 - afebrile, denies any symptoms, tolerating po intake, + bm, no urinary symptoms, plan discussed     Review of system- Rest of the review of system are negative except mentioned in HPI  Vital sings reviewed for last 24 h  T(C): 37.2 (10-03-23 @ 15:46), Max: 37.2 (10-03-23 @ 15:46)  T(F): 99 (10-03-23 @ 15:46), Max: 99 (10-03-23 @ 15:46)  HR: 75 (10-03-23 @ 15:46) (65 - 75)  BP: 130/54 (10-03-23 @ 15:46) (121/50 - 135/54)  RR: 18 (10-03-23 @ 15:46) (18 - 18)  SpO2: 92% (10-03-23 @ 15:46) (92% - 98%)  A1C 7.9 CAPILLARY BLOOD GLUCOSE   POCT Blood Glucose.: 117 mg/dL (03 Oct 2023 12:51)  POCT Blood Glucose.: 92 mg/dL (03 Oct 2023 08:20)  POCT Blood Glucose.: 104 mg/dL (02 Oct 2023 21:13)  POCT Blood Glucose.: 96 mg/dL (02 Oct 2023 17:55)    PHYSICAL EXAM:  Constitutional: NAD, awake and alert   HEENT: PERR, EOMI, Normal Hearing, MMM  Neck: Soft and supple, No LAD, No JVD  Respiratory: Breath sounds are clear bilaterally, No wheezing, rales or rhonchi  Cardiovascular: S1 and S2, regular rate and rhythm, no Murmurs, gallops or rubs  Gastrointestinal: Bowel Sounds present, soft, nontender, nondistended, no guarding, no rebound  Extremities: No peripheral edema  Vascular: 2+ peripheral pulses  Neurological: A/O x 3, no focal deficits  Musculoskeletal: 5/5 strength b/l upper and lower extremities  Skin: No rashes  rectal : deferred, not indicated      · Assessment    89 y/o female with PMHx of HTN, DM, Dementia and recent admit for urosepsis with e.coli bacteremia who presented to   on 10/1/23 with CC of weakness and fever.  In the ER, patient had temp of 100.7.  WBC of 11.  Pancultures and given rocephin.        Episode of the fever 101, Partially treated UTI   - CXR clear, pancultures negative   - RVP/ covid negative.    - afebrile 10/2. 10/3   - c/w ceftin    - CT abd 9/23 -2.9 cm left adnexal cyst , bilateral renal cysts, b/l parapelvic cysts , bibasilar atelectasis   - CT chest 9/18 - LAKISHA opacities, atelectasis   - incentive spirometry    Weakness due to orthostatic hypotension  - lower dose of amlodipine 10--> 5  - s/p 250cc bolus , orthostatics improved  - stop lasix with potassium ( was prescribed for ankle edema which can be caused by amlodipine - prescribing cascade)     Recent admit for urosepsis with e.coli bacteremia  - Cont home ceftin   - s/p rocephin in ER on 10/1.  -->  cont ceftin for 2 more days.      Metabolic encephalopathy - Suspect related to fever.  , TSH, folate B12 wnl     Dementia:  Cont home namenda.      HTN with orthostatic hypotension -norvasc.  lower dose 5 mg     DM2 diet controlled - BGMs last admit 100-150. A1C 7.9, restart home meds        Final diagnosis, treatment plan, and follow-up recommendations were discussed and explained to the patient.   The patient was given an opportunity to ask questions concerning the diagnosis and treatment plan.   The patient acknowledged understanding of the diagnosis, treatment, and follow-up recommendations.   The patient was advised to seek urgent care upon discharge if worsening symptoms develop prior to scheduled follow-up.   Time spent on discharge included time with the patient, and also coordinating discharge care as outlined below.  Discharge note faxed to PCP with my contact information to call me back   PCP Dr. Sauer  Total time spent: 50 min

## 2023-10-03 NOTE — DISCHARGE NOTE NURSING/CASE MANAGEMENT/SOCIAL WORK - NSDCPEFALRISK_GEN_ALL_CORE
For information on Fall & Injury Prevention, visit: [email or web address] OR  [email or web address] OR  https://www.cdc.gov/steadi/patient.html

## 2023-10-03 NOTE — DISCHARGE NOTE PROVIDER - NSDCCPTREATMENT_GEN_ALL_CORE_FT
PRINCIPAL PROCEDURE  Procedure: Abdomen CT  Findings and Treatment: PROCEDURE DATE:  09/17/2023    FINDINGS:  LOWER CHEST: Coronary artery calcifications versus stenting. Aortic valve   leaflet calcifications. Bibasilar subsegmental atelectasis.  LIVER: Within normal limits.  BILE DUCTS: Normal caliber.  GALLBLADDER: Cholelithiasis.  SPLEEN: Within normal limits.  PANCREAS: Mildly atrophic.  ADRENALS: Within normal limits.  KIDNEYS/URETERS: Bilateral renal cysts and low-density lesions too small   to characterize. Bilateral parapelvic cysts.  BLADDER: Contrast within the urinary bladder.  REPRODUCTIVE ORGANS: Uterus and adnexa within normal limits. 2.9 cm   simple appearing left adnexal cyst, likely benign.  BOWEL: No bowel obstruction. Moderate colonic stool burden. Colonic   diverticulosis without diverticulitis. Appendix is normal.  PERITONEUM: No ascites.  VESSELS: Atherosclerotic changes.  RETROPERITONEUM/LYMPH NODES: No lymphadenopathy.  ABDOMINAL WALL: Within normal limits.  BONES: Degenerative changes.  IMPRESSION:  Colonic diverticulosis without evidence of acute diverticulitis.  Cholelithiasis without CT evidence of acute cholecystitis.        SECONDARY PROCEDURE  Procedure: CT chest wo con  Findings and Treatment: INTERPRETATION:  Clinical information: Evaluate for pneumonia.  CT scan of the chest was obtained without administration of intravenous   contrast.  Calcified lymph node is present in the pretracheal space. Few small lymph   nodes are also noted in the AP window.  Heart is enlarged in size. Calcification the coronary arteries is noted.   No pericardial effusion is noted.  No endobronchial lesions are noted. Minimal atelectasis is noted in the   posterior dependent aspect of the right lower lobe. Patchy groundglass   opacities are present in the peripheral aspect of the left upper lobe. No   pleural effusions are noted.  Below thediaphragm, visualized portions of the abdomen demonstrate   residual contrast in the left renal collecting system.  Degenerative changes of the spine are noted.  IMPRESSION: Patchy groundglass opacities in the left upper lobe are of   uncertain etiology.

## 2023-10-03 NOTE — DISCHARGE NOTE PROVIDER - NSDCMRMEDTOKEN_GEN_ALL_CORE_FT
acetaminophen 325 mg oral tablet: 2 tab(s) orally every 6 hours As needed Temp greater or equal to 38C (100.4F), Mild Pain (1 - 3)  amLODIPine 5 mg oral tablet: 1 tab(s) orally once a day  cefuroxime 500 mg oral tablet: 1 tab(s) orally 2 times a day x 8 days ***Course Not Complete***  lactobacillus acidophilus oral capsule: 1 cap(s) orally 2 times a day  memantine 28 mg oral capsule, extended release: 1 tab(s) orally once a day  Multiple Vitamins with Minerals oral tablet: 1 tab(s) orally once a day

## 2023-10-03 NOTE — DISCHARGE NOTE PROVIDER - CARE PROVIDER_API CALL
Sky Sauer  Family Medicine  [address] [place] [address]  Phone: [phone]  Fax: [phone]  Follow Up Time: 1 week

## 2023-10-03 NOTE — DISCHARGE NOTE NURSING/CASE MANAGEMENT/SOCIAL WORK - PATIENT PORTAL LINK FT
You can access the FollowMyHealth Patient Portal offered by [facility] by registering at the following website: [email or web address]. By joining [employer]’s FollowMyHealth portal, you will also be able to view your health information using other applications (apps) compatible with our system.

## 2023-10-06 LAB
CULTURE RESULTS: SIGNIFICANT CHANGE UP
CULTURE RESULTS: SIGNIFICANT CHANGE UP
SPECIMEN SOURCE: SIGNIFICANT CHANGE UP
SPECIMEN SOURCE: SIGNIFICANT CHANGE UP

## 2023-10-18 DIAGNOSIS — N39.0 URINARY TRACT INFECTION, SITE NOT SPECIFIED: ICD-10-CM

## 2023-10-18 DIAGNOSIS — A41.9 SEPSIS, UNSPECIFIED ORGANISM: ICD-10-CM

## 2023-10-18 DIAGNOSIS — Z96.659 PRESENCE OF UNSPECIFIED ARTIFICIAL KNEE JOINT: ICD-10-CM

## 2023-10-18 DIAGNOSIS — J98.11 ATELECTASIS: ICD-10-CM

## 2023-10-18 DIAGNOSIS — E11.9 TYPE 2 DIABETES MELLITUS WITHOUT COMPLICATIONS: ICD-10-CM

## 2023-10-18 DIAGNOSIS — I10 ESSENTIAL (PRIMARY) HYPERTENSION: ICD-10-CM

## 2023-10-18 DIAGNOSIS — Z66 DO NOT RESUSCITATE: ICD-10-CM

## 2023-10-18 DIAGNOSIS — N83.8 OTHER NONINFLAMMATORY DISORDERS OF OVARY, FALLOPIAN TUBE AND BROAD LIGAMENT: ICD-10-CM

## 2023-10-18 DIAGNOSIS — G93.41 METABOLIC ENCEPHALOPATHY: ICD-10-CM

## 2023-10-18 DIAGNOSIS — R50.9 FEVER, UNSPECIFIED: ICD-10-CM

## 2023-10-18 DIAGNOSIS — I95.1 ORTHOSTATIC HYPOTENSION: ICD-10-CM

## 2023-10-18 DIAGNOSIS — R53.81 OTHER MALAISE: ICD-10-CM

## 2023-10-18 DIAGNOSIS — Z87.01 PERSONAL HISTORY OF PNEUMONIA (RECURRENT): ICD-10-CM

## 2023-10-18 DIAGNOSIS — R53.1 WEAKNESS: ICD-10-CM

## 2023-10-18 DIAGNOSIS — F03.90 UNSPECIFIED DEMENTIA, UNSPECIFIED SEVERITY, WITHOUT BEHAVIORAL DISTURBANCE, PSYCHOTIC DISTURBANCE, MOOD DISTURBANCE, AND ANXIETY: ICD-10-CM

## 2024-04-09 NOTE — ED PROVIDER NOTE - NSCAREINITIATED _GEN_ER
REFLEXIVE URINE CULTURE 10,000 - 50,000 CFU/mL Mixed bacterial yumiko with no predominating type        Conveyed results  States she is feeling better with the antibiotics  Will f/u with pcp as needed     Sera Snow(Attending)

## 2024-10-21 ENCOUNTER — EMERGENCY (EMERGENCY)
Facility: HOSPITAL | Age: 89
LOS: 0 days | Discharge: HOME | End: 2024-10-21
Attending: EMERGENCY MEDICINE
Payer: MEDICARE

## 2024-10-21 VITALS
OXYGEN SATURATION: 97 % | RESPIRATION RATE: 18 BRPM | SYSTOLIC BLOOD PRESSURE: 159 MMHG | DIASTOLIC BLOOD PRESSURE: 60 MMHG | TEMPERATURE: 98 F | HEART RATE: 69 BPM

## 2024-10-21 VITALS
SYSTOLIC BLOOD PRESSURE: 153 MMHG | HEART RATE: 71 BPM | DIASTOLIC BLOOD PRESSURE: 62 MMHG | RESPIRATION RATE: 18 BRPM | TEMPERATURE: 98 F | OXYGEN SATURATION: 98 %

## 2024-10-21 DIAGNOSIS — Z96.659 PRESENCE OF UNSPECIFIED ARTIFICIAL KNEE JOINT: Chronic | ICD-10-CM
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
## 2024-10-21 DIAGNOSIS — S80.02XA CONTUSION OF LEFT KNEE, INITIAL ENCOUNTER: ICD-10-CM

## 2024-10-21 DIAGNOSIS — R60.9 EDEMA, UNSPECIFIED: ICD-10-CM

## 2024-10-21 DIAGNOSIS — E11.9 TYPE 2 DIABETES MELLITUS WITHOUT COMPLICATIONS: ICD-10-CM

## 2024-10-21 DIAGNOSIS — S01.01XA LACERATION WITHOUT FOREIGN BODY OF SCALP, INITIAL ENCOUNTER: ICD-10-CM

## 2024-10-21 DIAGNOSIS — Z23 ENCOUNTER FOR IMMUNIZATION: ICD-10-CM
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
## 2024-10-21 DIAGNOSIS — Z66 DO NOT RESUSCITATE: ICD-10-CM

## 2024-10-21 DIAGNOSIS — I10 ESSENTIAL (PRIMARY) HYPERTENSION: ICD-10-CM

## 2024-10-21 DIAGNOSIS — F03.90 UNSPECIFIED DEMENTIA, UNSPECIFIED SEVERITY, WITHOUT BEHAVIORAL DISTURBANCE, PSYCHOTIC DISTURBANCE, MOOD DISTURBANCE, AND ANXIETY: ICD-10-CM

## 2024-10-21 DIAGNOSIS — S09.90XA UNSPECIFIED INJURY OF HEAD, INITIAL ENCOUNTER: ICD-10-CM

## 2024-10-21 PROCEDURE — 90715 TDAP VACCINE 7 YRS/> IM: CPT

## 2024-10-21 PROCEDURE — 73590 X-RAY EXAM OF LOWER LEG: CPT | Mod: LT

## 2024-10-21 PROCEDURE — 70450 CT HEAD/BRAIN W/O DYE: CPT | Mod: 26,MC

## 2024-10-21 PROCEDURE — 90471 IMMUNIZATION ADMIN: CPT

## 2024-10-21 PROCEDURE — 73562 X-RAY EXAM OF KNEE 3: CPT | Mod: 26,LT

## 2024-10-21 PROCEDURE — 73562 X-RAY EXAM OF KNEE 3: CPT | Mod: LT

## 2024-10-21 PROCEDURE — 73590 X-RAY EXAM OF LOWER LEG: CPT | Mod: 26,LT

## 2024-10-21 PROCEDURE — 72125 CT NECK SPINE W/O DYE: CPT | Mod: MC

## 2024-10-21 PROCEDURE — 99285 EMERGENCY DEPT VISIT HI MDM: CPT | Mod: 25

## 2024-10-21 PROCEDURE — 73552 X-RAY EXAM OF FEMUR 2/>: CPT | Mod: LT

## 2024-10-21 PROCEDURE — 99284 EMERGENCY DEPT VISIT MOD MDM: CPT | Mod: 25

## 2024-10-21 PROCEDURE — 73552 X-RAY EXAM OF FEMUR 2/>: CPT | Mod: 26,LT

## 2024-10-21 PROCEDURE — 73502 X-RAY EXAM HIP UNI 2-3 VIEWS: CPT | Mod: 26,LT

## 2024-10-21 PROCEDURE — 72125 CT NECK SPINE W/O DYE: CPT | Mod: 26,MC

## 2024-10-21 PROCEDURE — 12002 RPR S/N/AX/GEN/TRNK2.6-7.5CM: CPT

## 2024-10-21 PROCEDURE — 70450 CT HEAD/BRAIN W/O DYE: CPT | Mod: MC

## 2024-10-21 PROCEDURE — 73502 X-RAY EXAM HIP UNI 2-3 VIEWS: CPT | Mod: LT

## 2024-10-21 RX ORDER — TETANUS TOXOID, REDUCED DIPHTHERIA TOXOID AND ACELLULAR PERTUSSIS VACCINE, ADSORBED 5; 2.5; 8; 8; 2.5 [IU]/.5ML; [IU]/.5ML; UG/.5ML; UG/.5ML; UG/.5ML
0.5 SUSPENSION INTRAMUSCULAR ONCE
Refills: 0 | Status: COMPLETED | OUTPATIENT
Start: 2024-10-21 | End: 2024-10-21

## 2024-10-21 RX ADMIN — TETANUS TOXOID, REDUCED DIPHTHERIA TOXOID AND ACELLULAR PERTUSSIS VACCINE, ADSORBED 0.5 MILLILITER(S): 5; 2.5; 8; 8; 2.5 SUSPENSION INTRAMUSCULAR at 16:47

## 2024-10-21 NOTE — ED ADULT TRIAGE NOTE - NS ED NURSE AMBULANCES
[facility]

## 2024-10-21 NOTE — ED ADULT NURSE NOTE - OBJECTIVE STATEMENT
89yF presents to the ED with reports of fall, BIBA from home, daughter at bedside. pt arrived with c-collar in place from EMS. daughter states pt was with aide when her toe caught on a step and she fell backwards. +head strike, pt is on blood thinning medications. aide reported no LOC. pt has wound to posterior head, bleeding controlled with gauze in ED.   pt AAOx2, at baseline as per daughter, hx of dementia, pt able to follow commands. pupils 2mm reactive BL. full strength/ROM in all extremitites. +finger  BL. no arm drift, facial droop or slurred speech noted. pt reports pain to posterior head at site of wound.  denies vision changes, HA, N/V, abdominal pain, back pain, numbness/tingling, weakness, SOB, CP, neck pain.

## 2024-10-21 NOTE — ED PROVIDER NOTE - PHYSICAL EXAMINATION
Gen'l: Elderly WF, alert, no respiratory distress, non-toxic  Head: + occipital scalp lac ~ 2 cm, mild oozing of blood, no obvious skull deformity.  Eyes: PERRL, EOMI, no raccoon's  ENT: No Diaz's, O/P clear, mm slightly dry, no clinical evidence of facial injury  CV: RRR, normal radial pulse  Lungs: CTA, normal respirations  GI: soft, NT/ND, BS+  : Deferred, no flank nor CVAT  Neck: NT, supple w/o pain  MSK: Back, chest wall, pelvis: NT & stable.  SCOTT x 4, no focal extremity deformity.  B/L SLR 30 degrees w/o pain.  L knee tender w/o deformity/swelling/discoloration, jt stable.  B/L LEs distal motor/sensory intact  Skin: No tactile warmth, no rash.  No external signs of trauma except for scalp lac.  Neuro: Alert, CN 2 - 12 intact, normal speech, no focal motor/sensory deficits Gen'l: Elderly WF, alert, no respiratory distress, non-toxic  Head: + occipital scalp lac ~ 2 cm, mild oozing of blood, no obvious skull deformity.  Eyes: PERRL, EOMI, no raccoon's  ENT: No Diaz's, O/P clear, mm slightly dry, no clinical evidence of facial injury  CV: RRR, normal radial pulse  Lungs: CTA, normal respirations  GI: soft, NT/ND, BS+  : Deferred, no flank nor CVAT  Neck: NT, supple w/o pain  MSK: Back, chest wall, pelvis: NT & stable.  SCOTT x 4, no focal extremity deformity.  B/L SLR 30 degrees w/o pain.  L knee mildly tender w/o deformity/swelling/discoloration, jt stable.  B/L LEs distal motor/sensory intact  Skin: No tactile warmth, no rash.  No external signs of trauma except for scalp lac.  Neuro: Alert, CN 2 - 12 intact, normal speech, no focal motor/sensory deficits

## 2024-10-21 NOTE — ED ADULT NURSE REASSESSMENT NOTE - NS ED NURSE REASSESS COMMENT FT1
pt able to ambulate with walker with steady gait and no additional assistance. Dr. Lofton made aware.

## 2024-10-21 NOTE — ED PROVIDER NOTE - CLINICAL SUMMARY MEDICAL DECISION MAKING FREE TEXT BOX
89-year-old WF, PMH including dementia, DM2, HTN, orthostatic hypotension, peripheral edema on diuretic, pulmonary atelectasis, DNR status, BIBA from home for evaluation of mechanical fall with head injury, scalp bleeding.  EMS administered c-collar.  Incident occurred ~ 2;30 PM.  No reported LOC, no ASA nor AC meds use.    Plan: Neuro Alert; CT head & c-spine, fall precautions.  Observe, reassess    ORAL Lofton MD:  After returned from CT (reports no acute traumatic pathology), + L knee TTP.  XRs ordered, ED PA aware of lac repair to scalp. 89-year-old WF, PMH including dementia, DM2, HTN, orthostatic hypotension, peripheral edema on diuretic, pulmonary atelectasis, DNR status, BIBA from home for evaluation of mechanical fall with head injury, scalp bleeding.  EMS administered c-collar.  Incident occurred ~ 2;30 PM.  No reported LOC, no ASA nor AC meds use.    Plan: Neuro Alert; CT head & c-spine, fall precautions.  Observe, reassess    ORAL Lofton MD:  After returned from CT (reports no acute traumatic pathology), + L knee TTP.  XRs ordered, ED PA aware of lac repair to scalp.    18:50, ORAL Lofton MD:  Lac repair/staples by ED PA appreciated. XRs report: no fx/dislocation.  Informed by ED RN that pt passed ambulation trial.   patient and daughter at bedside informed of X-ray results negative for fracture/dislocation, expressed their understanding and agree with DC home for outpatient PCP follow-up. 89-year-old WF, PMH including dementia, DM2, HTN, orthostatic hypotension, peripheral edema on diuretic, pulmonary atelectasis, DNR status, BIBA from home for evaluation of mechanical fall with head injury, scalp bleeding.  EMS administered c-collar.  Incident occurred ~ 2;30 PM.  No reported LOC, no ASA nor AC meds use.    Plan: Neuro Alert: CT head & c-spine, fall precautions, TdAP.  Observe, reassess    ORAL Lofton MD:  After returned from CT (reports no acute traumatic pathology), + L knee TTP.  XRs ordered, ED PA aware of lac repair to scalp.    18:50, ORAL Lofton MD:  Lac repair/staples by ED PA appreciated (lac proved to be ~ 6 cm in length). XRs report: no fx/dislocation.  Informed by ED RN that pt passed ambulation trial.  Patient and daughter at bedside informed of X-ray results negative for fracture/dislocation, expressed their understanding and agree with DC home for outpatient PCP follow-up.

## 2024-10-21 NOTE — ED PROVIDER NOTE - CARE PLAN
1 Principal Discharge DX:	Closed head injury  Secondary Diagnosis:	Contusion of left knee, initial encounter  Secondary Diagnosis:	Dementia   Principal Discharge DX:	Closed head injury  Secondary Diagnosis:	Contusion of left knee, initial encounter  Secondary Diagnosis:	Dementia  Secondary Diagnosis:	Scalp laceration, initial encounter

## 2024-10-21 NOTE — ED PROVIDER NOTE - NSFOLLOWUPINSTRUCTIONS_ED_ALL_ED_FT
Tylenol 325 mg 2 tablets every 6 hours as needed for headache, aches and pains.    Continue regular medications as per routine.    Fall precautions as listed below.    Use walker for ambulation assistance.  Be extra careful when going down steps or moving from 1 room to another if there is a step-off or divider between the rooms.    Sutures removal in 7 days: Local urgent care or nearest ED.    While in ED you received a tetanus booster shot: It is good for 10 years to help prevent against the diseased tetanus.      Closed Head Injury    A closed head injury is an injury to your head that may or may not involve a traumatic brain injury (TBI). Symptoms of TBI can be short or long lasting and include headache, dizziness, interference with memory or speech, fatigue, confusion, changes in sleep, mood changes, nausea, depression/anxiety, and dulling of senses. Make sure to obtain proper rest which includes getting plenty of sleep, avoiding excessive visual stimulation, and avoiding activities that may cause physical or mental stress. Avoid any situation where there is potential for another head injury, including sports.    SEEK IMMEDIATE MEDICAL CARE IF YOU HAVE ANY OF THE FOLLOWING SYMPTOMS: unusual drowsiness, vomiting, severe dizziness, seizures, lightheadedness, muscular weakness, different pupil sizes, visual changes, or clear or bloody discharge from your ears or nose.          Contusion    A contusion is a deep bruise. Contusions are the result of a blunt injury to tissues and muscle fibers under the skin. The skin overlying the contusion may turn blue, purple, or yellow. Symptoms also include pain and swelling in the injured area.    SEEK IMMEDIATE MEDICAL CARE IF YOU HAVE ANY OF THE FOLLOWING SYMPTOMS: severe pain, numbness, tingling, pain, weakness, or skin color/temperature change in any part of your body distal to the injury.          Staple Care    WHAT YOU NEED TO KNOW:    How do I care for my wound?    Clean:  You may be able to shower in 24 hours. Do not soak your wound under water.    Gently wash your wound with soap and warm water daily. Lightly pat it dry. Do not cover your wound unless your healthcare provider tells you to.    You may also need to clean your wound with a mixture of hydrogen peroxide and water. Ask your healthcare provider how to do this.    Do not apply ointment or cream to the wound unless your healthcare provider tells you to.    Elevate:  Rest any arm or leg that has a wound on pillows above the level of your heart. Do this as often as possible for 2 days. This will help decrease swelling and pain.      Minimize scarring:  Avoid sunshine on your wound to reduce scarring.    When should I follow up with my healthcare provider? You may need to return for a wound checkup 3 days after your staples are placed. Ask your healthcare provider when to return to get your staples removed. Your healthcare provider will take your staples out as soon as possible to reduce scarring. Face staples may be removed within 3 to 5 days. Scalp, arm, and leg staples may be removed within 7 to 10 days. Joint, palm, and feet staples may be removed within 10 to 14 days.    How will my staples be removed?    A medical staple remover will be used to take out your staples. Your healthcare provider will slide the tool under each staple, squeeze the handle, and gently pull the staple out.    Medical tape will be placed on your wound once your staples are removed. This will help keep your wound closed. The medical tape will fall off on its own after several days.  When should I contact my healthcare provider?    You have redness, pain, swelling, or pus draining from your wound.    Your pain medicine does not relieve your pain.    You have a fever of 101°F (38.5°C) or higher.    You have an odor coming from your wound.    You have questions or concerns about your condition or care.  When should I seek immediate care?    Your wound reopens.    You have red streaks on your skin that spread out from your wound.    You have severe pain or vomiting.  CARE AGREEMENT:    You have the right to help plan your care. Learn about your health condition and how it may be treated. Discuss treatment options with your healthcare providers to decide what care you want to receive. You always have the right to refuse treatment.

## 2024-10-21 NOTE — ED PROVIDER NOTE - PATIENT PORTAL LINK FT
You can access the FollowMyHealth Patient Portal offered by [facility] by registering at the following website: [email or web address]. By joining [email or web address]’s FollowMyHealth portal, you will also be able to view your health information using other applications (apps) compatible with our system.

## 2024-10-21 NOTE — ED ADULT TRIAGE NOTE - CHIEF COMPLAINT QUOTE
Pt BIBEMS from home s/p mechanical trip and fall, hitting back of head on railing. (+) head strike w/ laceration to back of head (-) LOC unknown blood thinners. C-collar in place by EMS. MD Lofton at bedside. NA called in triage,
negative...

## 2024-10-21 NOTE — ED ADULT NURSE NOTE - CHIEF COMPLAINT QUOTE
Pt BIBEMS from home s/p mechanical trip and fall, hitting back of head on railing. (+) head strike w/ laceration to back of head (-) LOC unknown blood thinners. C-collar in place by EMS. MD Lofton at bedside. NA called in triage,

## 2024-10-21 NOTE — ED PROVIDER NOTE - OBJECTIVE STATEMENT
89-year-old WF, PMH including dementia, DM2, HTN, orthostatic hypotension, peripheral edema on diuretic, pulmonary atelectasis, DNR status, BIBA from home for evaluation of mechanical fall with head injury, scalp bleeding.  EMS administered c-collar.  Incident occurred ~ 2;30 PM:  while using walker with HHA in attendance leaving her apartment, a foot got caught on a step, causing patient to lose her balance and fall backwards hitting the back of her head against a wooden railing.  No LOC, no ASA nor AC meds use.  Bleeding from occipital scalp lack, associated mild soreness locally at wound site.   Patient denies vision/speech/swallow changes, neck/back/chest/abdomen/extremities pain s/p fall, B/B incontinence, N/V, SOB. 89-year-old WF, PMH including dementia, DM2, HTN, orthostatic hypotension, peripheral edema on diuretic, pulmonary atelectasis, DNR status, BIBA from home for evaluation of mechanical fall with head injury, scalp bleeding.  EMS administered c-collar.  Incident occurred ~ 2;30 PM:  while using walker with HHA in attendance leaving her apartment, a foot got caught on a step, causing patient to lose her balance and fall backwards hitting the back of her head against a wooden railing.  No reported LOC, no ASA nor AC meds use.  Bleeding from occipital scalp lac, associated mild soreness locally at wound site.   Patient denies vision/speech/swallow changes, neck/back/chest/abdomen/extremities pain s/p fall, B/B incontinence, N/V, SOB. 89-year-old WF, PMH including dementia, DM2, HTN, orthostatic hypotension, peripheral edema on diuretic, pulmonary atelectasis, DNR status, BIBA from home for evaluation of mechanical fall with head injury, scalp bleeding.  EMS administered c-collar.  Incident occurred ~ 2;30 PM:  while using walker with HHA in attendance leaving her apartment, a foot got caught on a step, causing patient to lose her balance and fall backwards hitting the back of her head against a wooden railing.  No reported LOC, no ASA nor AC meds use.  +Bleeding from occipital scalp lac, associated mild soreness locally at wound site but denies HA.   Patient denies vision/speech/swallow changes, neck/back/chest/abdomen/extremities pain s/p fall, B/B incontinence, N/V, SOB.

## 2024-10-21 NOTE — ED ADULT NURSE NOTE - NSFALLHARMRISKINTERV_ED_ALL_ED

## 2024-10-21 NOTE — ED ADULT NURSE NOTE - HOW OFTEN DO YOU HAVE A DRINK CONTAINING ALCOHOL?
What Type Of Note Output Would You Prefer (Optional)?: Standard Output How Severe Are Your Spot(S)?: moderate Hpi Title: Evaluation of Skin Lesions Never